# Patient Record
Sex: FEMALE | Race: WHITE | Employment: OTHER | URBAN - METROPOLITAN AREA
[De-identification: names, ages, dates, MRNs, and addresses within clinical notes are randomized per-mention and may not be internally consistent; named-entity substitution may affect disease eponyms.]

---

## 2022-05-02 ENCOUNTER — APPOINTMENT (OUTPATIENT)
Dept: GENERAL RADIOLOGY | Age: 77
End: 2022-05-02
Payer: MEDICARE

## 2022-05-02 ENCOUNTER — APPOINTMENT (OUTPATIENT)
Dept: CT IMAGING | Age: 77
End: 2022-05-02
Payer: MEDICARE

## 2022-05-02 ENCOUNTER — HOSPITAL ENCOUNTER (EMERGENCY)
Age: 77
Discharge: HOME | End: 2022-05-02
Attending: EMERGENCY MEDICINE
Payer: MEDICARE

## 2022-05-02 VITALS
HEIGHT: 60 IN | RESPIRATION RATE: 18 BRPM | OXYGEN SATURATION: 98 % | WEIGHT: 179.1 LBS | TEMPERATURE: 97.8 F | SYSTOLIC BLOOD PRESSURE: 171 MMHG | BODY MASS INDEX: 35.16 KG/M2 | HEART RATE: 90 BPM | DIASTOLIC BLOOD PRESSURE: 65 MMHG

## 2022-05-02 DIAGNOSIS — N20.1 LEFT URETERAL CALCULUS: Primary | ICD-10-CM

## 2022-05-02 DIAGNOSIS — R06.02 SHORTNESS OF BREATH: ICD-10-CM

## 2022-05-02 LAB
ANION GAP SERPL CALCULATED.3IONS-SCNC: 18 MMOL/L (ref 3–16)
BACTERIA: ABNORMAL /HPF
BASOPHILS ABSOLUTE: 0 K/UL (ref 0–0.2)
BASOPHILS RELATIVE PERCENT: 0.2 %
BILIRUBIN URINE: NEGATIVE
BLOOD, URINE: ABNORMAL
BUN BLDV-MCNC: 27 MG/DL (ref 7–20)
CALCIUM SERPL-MCNC: 10.1 MG/DL (ref 8.3–10.6)
CHLORIDE BLD-SCNC: 99 MMOL/L (ref 99–110)
CLARITY: CLEAR
CO2: 18 MMOL/L (ref 21–32)
COLOR: YELLOW
CREAT SERPL-MCNC: 1.3 MG/DL (ref 0.6–1.2)
EOSINOPHILS ABSOLUTE: 0.2 K/UL (ref 0–0.6)
EOSINOPHILS RELATIVE PERCENT: 1.3 %
EPITHELIAL CELLS, UA: 1 /HPF (ref 0–5)
GFR AFRICAN AMERICAN: 48
GFR NON-AFRICAN AMERICAN: 40
GLUCOSE BLD-MCNC: 157 MG/DL (ref 70–99)
GLUCOSE URINE: NEGATIVE MG/DL
HCT VFR BLD CALC: 39.4 % (ref 36–48)
HEMOGLOBIN: 13 G/DL (ref 12–16)
HYALINE CASTS: 0 /LPF (ref 0–8)
KETONES, URINE: NEGATIVE MG/DL
LEUKOCYTE ESTERASE, URINE: NEGATIVE
LYMPHOCYTES ABSOLUTE: 1.8 K/UL (ref 1–5.1)
LYMPHOCYTES RELATIVE PERCENT: 11.5 %
MCH RBC QN AUTO: 28.1 PG (ref 26–34)
MCHC RBC AUTO-ENTMCNC: 33.1 G/DL (ref 31–36)
MCV RBC AUTO: 85 FL (ref 80–100)
MICROSCOPIC EXAMINATION: YES
MONOCYTES ABSOLUTE: 1.3 K/UL (ref 0–1.3)
MONOCYTES RELATIVE PERCENT: 8.3 %
NEUTROPHILS ABSOLUTE: 12.5 K/UL (ref 1.7–7.7)
NEUTROPHILS RELATIVE PERCENT: 78.7 %
NITRITE, URINE: NEGATIVE
PDW BLD-RTO: 15.3 % (ref 12.4–15.4)
PH UA: 8 (ref 5–8)
PLATELET # BLD: 444 K/UL (ref 135–450)
PMV BLD AUTO: 8 FL (ref 5–10.5)
POTASSIUM REFLEX MAGNESIUM: 3.6 MMOL/L (ref 3.5–5.1)
PRO-BNP: 1208 PG/ML (ref 0–449)
PROTEIN UA: ABNORMAL MG/DL
RBC # BLD: 4.64 M/UL (ref 4–5.2)
RBC UA: 7 /HPF (ref 0–4)
SODIUM BLD-SCNC: 135 MMOL/L (ref 136–145)
SPECIFIC GRAVITY UA: 1.01 (ref 1–1.03)
TROPONIN: <0.01 NG/ML
URINE REFLEX TO CULTURE: ABNORMAL
URINE TYPE: ABNORMAL
UROBILINOGEN, URINE: 1 E.U./DL
WBC # BLD: 15.9 K/UL (ref 4–11)
WBC UA: 1 /HPF (ref 0–5)

## 2022-05-02 PROCEDURE — 93005 ELECTROCARDIOGRAM TRACING: CPT | Performed by: EMERGENCY MEDICINE

## 2022-05-02 PROCEDURE — 85025 COMPLETE CBC W/AUTO DIFF WBC: CPT

## 2022-05-02 PROCEDURE — 36415 COLL VENOUS BLD VENIPUNCTURE: CPT

## 2022-05-02 PROCEDURE — 80048 BASIC METABOLIC PNL TOTAL CA: CPT

## 2022-05-02 PROCEDURE — 96360 HYDRATION IV INFUSION INIT: CPT

## 2022-05-02 PROCEDURE — 6370000000 HC RX 637 (ALT 250 FOR IP): Performed by: EMERGENCY MEDICINE

## 2022-05-02 PROCEDURE — 74176 CT ABD & PELVIS W/O CONTRAST: CPT

## 2022-05-02 PROCEDURE — 83880 ASSAY OF NATRIURETIC PEPTIDE: CPT

## 2022-05-02 PROCEDURE — 71046 X-RAY EXAM CHEST 2 VIEWS: CPT

## 2022-05-02 PROCEDURE — 99285 EMERGENCY DEPT VISIT HI MDM: CPT

## 2022-05-02 PROCEDURE — 84484 ASSAY OF TROPONIN QUANT: CPT

## 2022-05-02 PROCEDURE — 2580000003 HC RX 258: Performed by: EMERGENCY MEDICINE

## 2022-05-02 PROCEDURE — 81001 URINALYSIS AUTO W/SCOPE: CPT

## 2022-05-02 RX ORDER — TAMSULOSIN HYDROCHLORIDE 0.4 MG/1
0.4 CAPSULE ORAL DAILY
Qty: 5 CAPSULE | Refills: 0 | Status: ON HOLD | OUTPATIENT
Start: 2022-05-02 | End: 2022-06-14

## 2022-05-02 RX ORDER — 0.9 % SODIUM CHLORIDE 0.9 %
500 INTRAVENOUS SOLUTION INTRAVENOUS ONCE
Status: DISCONTINUED | OUTPATIENT
Start: 2022-05-02 | End: 2022-05-02

## 2022-05-02 RX ORDER — TAMSULOSIN HYDROCHLORIDE 0.4 MG/1
0.4 CAPSULE ORAL ONCE
Status: COMPLETED | OUTPATIENT
Start: 2022-05-02 | End: 2022-05-02

## 2022-05-02 RX ORDER — ACETAMINOPHEN 500 MG
1000 TABLET ORAL ONCE
Status: COMPLETED | OUTPATIENT
Start: 2022-05-02 | End: 2022-05-02

## 2022-05-02 RX ADMIN — ACETAMINOPHEN 1000 MG: 500 TABLET ORAL at 15:20

## 2022-05-02 RX ADMIN — TAMSULOSIN HYDROCHLORIDE 0.4 MG: 0.4 CAPSULE ORAL at 16:43

## 2022-05-02 RX ADMIN — SODIUM CHLORIDE 500 ML: 9 INJECTION, SOLUTION INTRAVENOUS at 15:19

## 2022-05-02 ASSESSMENT — PAIN SCALES - GENERAL
PAINLEVEL_OUTOF10: 4
PAINLEVEL_OUTOF10: 2

## 2022-05-02 ASSESSMENT — PAIN - FUNCTIONAL ASSESSMENT
PAIN_FUNCTIONAL_ASSESSMENT: 0-10
PAIN_FUNCTIONAL_ASSESSMENT: NONE - DENIES PAIN

## 2022-05-02 ASSESSMENT — PAIN DESCRIPTION - LOCATION: LOCATION: FLANK

## 2022-05-02 ASSESSMENT — PAIN DESCRIPTION - FREQUENCY: FREQUENCY: CONTINUOUS

## 2022-05-02 ASSESSMENT — PAIN DESCRIPTION - DESCRIPTORS: DESCRIPTORS: DISCOMFORT

## 2022-05-02 ASSESSMENT — PAIN DESCRIPTION - PAIN TYPE: TYPE: ACUTE PAIN

## 2022-05-02 NOTE — ED PROVIDER NOTES
CHIEF COMPLAINT  Shortness of Breath (s/p being dx with kidney stone, hx a fib)      HISTORY OF PRESENT ILLNESS  Lynette Mullins is a 64 y.o. female who has past medical history of A. Fib, CAD, DM, anxiety, hyperlipidemia presents to the ED complaining of shortness of breath today that occurred this morning when she was at rest.  Patient states that she feels better now that she is in the emergency room and no longer feels short of breath. Denies any home oxygen use. No chest pain. Does have history of A. fib and thought the A. fib may be acting up and therefore called EMS. Denies any nausea or vomiting. No cough. No fevers or chills. States he was recently diagnosed with a kidney stone on 4/30/2022 at [facility].  States that she has been taking Tylenol for pain control which does provide relief. Denies any dysuria or increase in frequency of urination. States she is tolerating oral intake. States she feels that the left flank pain is migrating down towards her left lower quadrant of her abdomen. States he is having normal bowel movements. No other complaints, modifying factors or associated symptoms. Nursing notes reviewed.    Past medical history of A. fib, coronary artery disease, diabetes mellitus, anxiety, hyperlipidemia  Denies any past surgical history  Denies any pertinent family history  Social History     Socioeconomic History    Marital status:      Spouse name: Not on file    Number of children: Not on file    Years of education: Not on file    Highest education level: Not on file   Occupational History    Not on file   Tobacco Use    Smoking status: Not on file    Smokeless tobacco: Not on file   Substance and Sexual Activity    Alcohol use: Not on file    Drug use: Not on file    Sexual activity: Not on file   Other Topics Concern    Not on file   Social History Narrative    Not on file     Social Determinants of Health     Financial Resource Strain:  Difficulty of Paying Living Expenses: Not on file   Food Insecurity:     Worried About Running Out of Food in the Last Year: Not on file    Ran Out of Food in the Last Year: Not on file   Transportation Needs:     Lack of Transportation (Medical): Not on file    Lack of Transportation (Non-Medical): Not on file   Physical Activity:     Days of Exercise per Week: Not on file    Minutes of Exercise per Session: Not on file   Stress:     Feeling of Stress : Not on file   Social Connections:     Frequency of Communication with Friends and Family: Not on file    Frequency of Social Gatherings with Friends and Family: Not on file    Attends Church Services: Not on file    Active Member of [address] [place] [address] [employer] or Organizations: Not on file    Attends Club or Organization Meetings: Not on file    Marital Status: Not on file   Intimate Partner Violence:     Fear of Current or Ex-Partner: Not on file    Emotionally Abused: Not on file    Physically Abused: Not on file    Sexually Abused: Not on file   Housing Stability:     Unable to Pay for Housing in the Last Year: Not on file    Number of [place] in the Last Year: Not on file    Unstable Housing in the Last Year: Not on file     No current facility-administered medications for this encounter.      Current Outpatient Medications   Medication Sig Dispense Refill    tamsulosin (FLOMAX) 0.4 MG capsule Take 1 capsule by mouth daily for 5 doses 5 capsule 0     No Known Allergies      REVIEW OF SYSTEMS  (up to 7 for level 4, 8 or more for level 5) pertinent positives per HPI otherwise noted to be negative    PHYSICAL EXAM  BP (!) 171/65   Pulse 90   Temp 97.8 °F (36.6 °C) (Oral)   Resp 18   Ht 5' (1.524 m)   Wt 179 lb 1.6 oz (81.2 kg)   SpO2 98%   BMI 34.98 kg/m²      CONSTITUTIONAL: AOx4, cooperative with exam, afebrile   HEAD: normocephalic, atraumatic   EYES: PERRL, EOMI, anicteric sclera   ENT: Moist mucous membranes, no swelling of the posterior pharynx   NECK: Supple, symmetric, trachea midline, no stridor   LUNGS: Bilateral breath sounds, CTAB, no rales/ronchi/wheezes, speaking in full sentences   CARDIOVASCULAR: RRR, normal S1/S2, no m/r/g, 2+ pulses throughout   ABDOMEN: Soft, non-tender, non-distended, +BS   NEUROLOGIC:  MAEx4, 5/5 strength throughout; fine touch sensation intact throughout; GCS 15   MUSCULOSKELETAL:  No lower extremity edema or calf tenderness bilaterally   SKIN: No rash, pallor or wounds on exposed surfaces         RADIOLOGY  X-RAYS:  I have reviewed radiologic plain film image(s). ALL OTHER NON-PLAIN FILM IMAGES SUCH AS CT, ULTRASOUND AND MRI HAVE BEEN READ BY THE RADIOLOGIST. CT ABDOMEN PELVIS WO CONTRAST Additional Contrast? None   Final Result   5 mm moderately obstructing left UVJ stone. There is delayed nephrogram   phase and contrast seen in the left renal collecting system and ureter. Perinephric stranding seen surrounding the left kidney as well as stranding   surrounding the left ureter. XR CHEST (2 VW)   Final Result   No acute process. EKG INTERPRETATION  Sinus rhythm with rate of 71, left axis deviation, bifascicular block with left anterior fascicular block and right bundle branch block, , , QTc 478, no ST elevations, nonspecific ST changes, no prior EKG on file. PROCEDURES    ED COURSE/MDM    Patient seen and evaluated. History and physical as above. Nontoxic, afebrile. Patient with complaints of shortness of breath. Patient was concerned that her A. fib may have been causing her shortness of breath. Patient is not in A. fib on her EKG and has not been in A. fib during her ED stay. On my evaluation, patient states she was feeling better and thinks that the shortness of breath may actually be due to her pain from her kidney stone. Did offer patient stronger pain control than Tylenol but patient deferred.   States that Tylenol was doing well at home and she would like to try Tylenol again. 1 g of Tylenol given. Patient's kidney function improved from her lab work on 4/30/2022. No signs of infection on the urine. Troponin negative. BNP mildly elevated at 1208. White count 15.9. Hemoglobin stable 13.0. With patient having known kidney stone and continued pain, will obtain a CT without contrast to evaluate position of stone. ED Course as of 05/02/22 2221   Mon May 02, 2022   1546 Notified by RN that the IV went bad. Patient did receive a small amount of her IV fluid bolus. With the patient tolerating oral intake, will provide large glass of water to drink. Awaiting CT abdomen pelvis results. [DS]   0849 Patient sleeping when I entered the room. Updated patient on blood work and image study results. States she is feeling much better. CT abdomen pelvis shows 5 mm moderately obstructing stone on the left which is consistent with patient's history. Smitha Richa is moving. Discussed with patient that she needs to be taking Flomax which she states she has not been taking. With patient tolerating oral intake, kidney function improving from previous lab work and no signs of septic stone, plan for discharge with outpatient follow-up. Urology follow-up provided. Return instruction provided. All questions answered prior to discharge. [DS]      ED Course User Index  [DS] Arnav Mitchell MD     I estimate there is LOW risk for ACUTE CORONARY SYNDROME, CHRONIC OBSTRUCTIVE PULMONARY DISEASE, CONGESTIVE HEART FAILURE, PERICARDIAL TAMPONADE, PNEUMONIA, PNEUMOTHORAX, PULMONARY EMBOLISM, SEPSIS, and THORACIC DISSECTION,  thus I consider the discharge disposition reasonable. Lester Couch and I have discussed the diagnosis and risks, and we agree with discharging home to follow-up with their primary doctor. We also discussed returning to the Emergency Department immediately if new or worsening symptoms occur.  We have discussed the symptoms which are most concerning (e.g., bloody sputum, fever, worsening pain or shortness of breath, vomiting) that necessitate immediate return. Patient was given scripts for the following medications. I counseled patient how to take these medications. Discharge Medication List as of 5/2/2022  4:37 PM      START taking these medications    Details   tamsulosin (FLOMAX) 0.4 MG capsule Take 1 capsule by mouth daily for 5 doses, Disp-5 capsule, R-0Normal                 CLINICAL IMPRESSION  1. Left ureteral calculus    2. Shortness of breath        Blood pressure (!) 171/65, pulse 90, temperature 97.8 °F (36.6 °C), temperature source Oral, resp. rate 18, height 5' (1.524 m), weight 179 lb 1.6 oz (81.2 kg), SpO2 98 %. DISPOSITION  Patient was discharged to home in good condition. Amy Disla MD  [facility] [address] [place] [address]  [phone]    Call in 1 day  For a follow up appointment. [address] [address]  [phone]    Call in 1 day  For a follow up appointment. Disclaimer: All medical record entries made by [address] [place] [address] [facility].       (Please note that this note was completed with a voice recognition program. Every attempt was made to edit the dictations, but inevitably there remain words that are mis-transcribed.)            Kenna Ravi MD  05/02/22 7743

## 2022-05-03 LAB
EKG ATRIAL RATE: 71 BPM
EKG DIAGNOSIS: NORMAL
EKG P AXIS: 50 DEGREES
EKG P-R INTERVAL: 108 MS
EKG Q-T INTERVAL: 440 MS
EKG QRS DURATION: 144 MS
EKG QTC CALCULATION (BAZETT): 478 MS
EKG R AXIS: -65 DEGREES
EKG T AXIS: 39 DEGREES
EKG VENTRICULAR RATE: 71 BPM

## 2022-05-03 PROCEDURE — 93010 ELECTROCARDIOGRAM REPORT: CPT | Performed by: INTERNAL MEDICINE

## 2022-05-04 ENCOUNTER — HOSPITAL ENCOUNTER (OUTPATIENT)
Dept: GENERAL RADIOLOGY | Age: 77
Discharge: HOME | End: 2022-05-04
Payer: MEDICARE

## 2022-05-04 ENCOUNTER — HOSPITAL ENCOUNTER (OUTPATIENT)
Age: 77
Discharge: HOME | End: 2022-05-04
Payer: MEDICARE

## 2022-05-04 DIAGNOSIS — N20.0 CALCULUS OF KIDNEY: ICD-10-CM

## 2022-05-04 PROCEDURE — 74018 RADEX ABDOMEN 1 VIEW: CPT

## 2022-06-13 ENCOUNTER — APPOINTMENT (OUTPATIENT)
Dept: GENERAL RADIOLOGY | Age: 77
End: 2022-06-13
Payer: MEDICARE

## 2022-06-13 ENCOUNTER — APPOINTMENT (OUTPATIENT)
Dept: CT IMAGING | Age: 77
End: 2022-06-13
Payer: MEDICARE

## 2022-06-13 ENCOUNTER — HOSPITAL ENCOUNTER (EMERGENCY)
Age: 77
Discharge: ANOTHER HOSPITAL | End: 2022-06-14
Attending: EMERGENCY MEDICINE
Payer: MEDICARE

## 2022-06-13 DIAGNOSIS — R41.82 ALTERED MENTAL STATUS, UNSPECIFIED ALTERED MENTAL STATUS TYPE: ICD-10-CM

## 2022-06-13 DIAGNOSIS — R65.10 SIRS (SYSTEMIC INFLAMMATORY RESPONSE SYNDROME) (HCC): ICD-10-CM

## 2022-06-13 DIAGNOSIS — G93.89 BRAIN MASS: Primary | ICD-10-CM

## 2022-06-13 LAB
A/G RATIO: 1.2 (ref 1.1–2.2)
ALBUMIN SERPL-MCNC: 4.5 G/DL (ref 3.4–5)
ALP BLD-CCNC: 95 U/L (ref 40–129)
ALT SERPL-CCNC: 26 U/L (ref 10–40)
AMORPHOUS: ABNORMAL /HPF
ANION GAP SERPL CALCULATED.3IONS-SCNC: 19 MMOL/L (ref 3–16)
AST SERPL-CCNC: 27 U/L (ref 15–37)
BACTERIA: ABNORMAL /HPF
BASOPHILS ABSOLUTE: 0 K/UL (ref 0–0.2)
BASOPHILS RELATIVE PERCENT: 0.2 %
BILIRUB SERPL-MCNC: 0.4 MG/DL (ref 0–1)
BILIRUBIN URINE: NEGATIVE
BLOOD, URINE: NEGATIVE
BUN BLDV-MCNC: 25 MG/DL (ref 7–20)
CALCIUM SERPL-MCNC: 10.4 MG/DL (ref 8.3–10.6)
CHLORIDE BLD-SCNC: 97 MMOL/L (ref 99–110)
CLARITY: ABNORMAL
CO2: 21 MMOL/L (ref 21–32)
COLOR: YELLOW
COMMENT UA: ABNORMAL
CREAT SERPL-MCNC: 1 MG/DL (ref 0.6–1.2)
EOSINOPHILS ABSOLUTE: 0 K/UL (ref 0–0.6)
EOSINOPHILS RELATIVE PERCENT: 0 %
EPITHELIAL CELLS, UA: 1 /HPF (ref 0–5)
GFR AFRICAN AMERICAN: >60
GFR NON-AFRICAN AMERICAN: 53
GLUCOSE BLD-MCNC: 180 MG/DL (ref 70–99)
GLUCOSE BLD-MCNC: 191 MG/DL (ref 70–99)
GLUCOSE URINE: 500 MG/DL
HCT VFR BLD CALC: 40.3 % (ref 36–48)
HEMOGLOBIN: 13.7 G/DL (ref 12–16)
HYALINE CASTS: 3 /LPF (ref 0–8)
KETONES, URINE: ABNORMAL MG/DL
LEUKOCYTE ESTERASE, URINE: NEGATIVE
LIPASE: 69 U/L (ref 13–60)
LYMPHOCYTES ABSOLUTE: 0.8 K/UL (ref 1–5.1)
LYMPHOCYTES RELATIVE PERCENT: 5.1 %
MCH RBC QN AUTO: 28.6 PG (ref 26–34)
MCHC RBC AUTO-ENTMCNC: 33.9 G/DL (ref 31–36)
MCV RBC AUTO: 84.4 FL (ref 80–100)
MICROSCOPIC EXAMINATION: YES
MONOCYTES ABSOLUTE: 0.4 K/UL (ref 0–1.3)
MONOCYTES RELATIVE PERCENT: 2.6 %
MUCUS: ABNORMAL /LPF
NEUTROPHILS ABSOLUTE: 14 K/UL (ref 1.7–7.7)
NEUTROPHILS RELATIVE PERCENT: 92.1 %
NITRITE, URINE: NEGATIVE
PDW BLD-RTO: 16.2 % (ref 12.4–15.4)
PERFORMED ON: ABNORMAL
PH UA: 5.5 (ref 5–8)
PLATELET # BLD: 356 K/UL (ref 135–450)
PMV BLD AUTO: 8.1 FL (ref 5–10.5)
POTASSIUM REFLEX MAGNESIUM: 3.8 MMOL/L (ref 3.5–5.1)
PROTEIN UA: 100 MG/DL
RBC # BLD: 4.78 M/UL (ref 4–5.2)
RBC UA: ABNORMAL /HPF (ref 0–4)
SODIUM BLD-SCNC: 137 MMOL/L (ref 136–145)
SPECIFIC GRAVITY UA: 1.02 (ref 1–1.03)
TOTAL PROTEIN: 8.2 G/DL (ref 6.4–8.2)
TROPONIN: <0.01 NG/ML
URINE REFLEX TO CULTURE: ABNORMAL
URINE TYPE: ABNORMAL
UROBILINOGEN, URINE: 0.2 E.U./DL
WBC # BLD: 15.2 K/UL (ref 4–11)
WBC UA: ABNORMAL /HPF (ref 0–5)

## 2022-06-13 PROCEDURE — 70450 CT HEAD/BRAIN W/O DYE: CPT

## 2022-06-13 PROCEDURE — 96365 THER/PROPH/DIAG IV INF INIT: CPT

## 2022-06-13 PROCEDURE — 93005 ELECTROCARDIOGRAM TRACING: CPT | Performed by: EMERGENCY MEDICINE

## 2022-06-13 PROCEDURE — 71045 X-RAY EXAM CHEST 1 VIEW: CPT

## 2022-06-13 PROCEDURE — 96375 TX/PRO/DX INJ NEW DRUG ADDON: CPT

## 2022-06-13 PROCEDURE — 6370000000 HC RX 637 (ALT 250 FOR IP): Performed by: PHYSICIAN ASSISTANT

## 2022-06-13 PROCEDURE — 99285 EMERGENCY DEPT VISIT HI MDM: CPT

## 2022-06-13 PROCEDURE — 83690 ASSAY OF LIPASE: CPT

## 2022-06-13 PROCEDURE — 80053 COMPREHEN METABOLIC PANEL: CPT

## 2022-06-13 PROCEDURE — 96367 TX/PROPH/DG ADDL SEQ IV INF: CPT

## 2022-06-13 PROCEDURE — 36415 COLL VENOUS BLD VENIPUNCTURE: CPT

## 2022-06-13 PROCEDURE — 2580000003 HC RX 258: Performed by: PHYSICIAN ASSISTANT

## 2022-06-13 PROCEDURE — 84484 ASSAY OF TROPONIN QUANT: CPT

## 2022-06-13 PROCEDURE — 74176 CT ABD & PELVIS W/O CONTRAST: CPT

## 2022-06-13 PROCEDURE — 81001 URINALYSIS AUTO W/SCOPE: CPT

## 2022-06-13 PROCEDURE — 6360000002 HC RX W HCPCS: Performed by: PHYSICIAN ASSISTANT

## 2022-06-13 PROCEDURE — 87040 BLOOD CULTURE FOR BACTERIA: CPT

## 2022-06-13 PROCEDURE — 85025 COMPLETE CBC W/AUTO DIFF WBC: CPT

## 2022-06-13 RX ORDER — DEXAMETHASONE SODIUM PHOSPHATE 4 MG/ML
10 INJECTION, SOLUTION INTRA-ARTICULAR; INTRALESIONAL; INTRAMUSCULAR; INTRAVENOUS; SOFT TISSUE ONCE
Status: COMPLETED | OUTPATIENT
Start: 2022-06-13 | End: 2022-06-13

## 2022-06-13 RX ORDER — LEVETIRACETAM 10 MG/ML
1000 INJECTION INTRAVASCULAR ONCE
Status: COMPLETED | OUTPATIENT
Start: 2022-06-13 | End: 2022-06-13

## 2022-06-13 RX ORDER — 0.9 % SODIUM CHLORIDE 0.9 %
500 INTRAVENOUS SOLUTION INTRAVENOUS ONCE
Status: COMPLETED | OUTPATIENT
Start: 2022-06-13 | End: 2022-06-13

## 2022-06-13 RX ORDER — ONDANSETRON 2 MG/ML
4 INJECTION INTRAMUSCULAR; INTRAVENOUS ONCE
Status: COMPLETED | OUTPATIENT
Start: 2022-06-13 | End: 2022-06-13

## 2022-06-13 RX ORDER — ACETAMINOPHEN 325 MG/1
650 TABLET ORAL ONCE
Status: COMPLETED | OUTPATIENT
Start: 2022-06-13 | End: 2022-06-13

## 2022-06-13 RX ADMIN — SODIUM CHLORIDE 500 ML: 9 INJECTION, SOLUTION INTRAVENOUS at 18:49

## 2022-06-13 RX ADMIN — Medication 6.25 MG: at 20:14

## 2022-06-13 RX ADMIN — ACETAMINOPHEN 650 MG: 325 TABLET ORAL at 18:50

## 2022-06-13 RX ADMIN — ONDANSETRON 4 MG: 2 INJECTION INTRAMUSCULAR; INTRAVENOUS at 18:50

## 2022-06-13 RX ADMIN — LEVETIRACETAM 1000 MG: 10 INJECTION INTRAVENOUS at 20:36

## 2022-06-13 RX ADMIN — DEXAMETHASONE SODIUM PHOSPHATE 10 MG: 4 INJECTION, SOLUTION INTRAMUSCULAR; INTRAVENOUS at 20:33

## 2022-06-13 RX ADMIN — CEFEPIME HYDROCHLORIDE 2000 MG: 2 INJECTION, POWDER, FOR SOLUTION INTRAVENOUS at 20:55

## 2022-06-13 ASSESSMENT — ENCOUNTER SYMPTOMS
CHEST TIGHTNESS: 0
BACK PAIN: 0
SHORTNESS OF BREATH: 0
NAUSEA: 1
ABDOMINAL PAIN: 1
CONSTIPATION: 0
EYES NEGATIVE: 1
DIARRHEA: 0
VOMITING: 0

## 2022-06-13 ASSESSMENT — PAIN - FUNCTIONAL ASSESSMENT
PAIN_FUNCTIONAL_ASSESSMENT: 0-10
PAIN_FUNCTIONAL_ASSESSMENT: 0-10
PAIN_FUNCTIONAL_ASSESSMENT: PREVENTS OR INTERFERES SOME ACTIVE ACTIVITIES AND ADLS

## 2022-06-13 ASSESSMENT — PAIN SCALES - GENERAL
PAINLEVEL_OUTOF10: 9
PAINLEVEL_OUTOF10: 8

## 2022-06-13 ASSESSMENT — PAIN DESCRIPTION - PAIN TYPE: TYPE: ACUTE PAIN

## 2022-06-13 ASSESSMENT — PAIN DESCRIPTION - DESCRIPTORS
DESCRIPTORS: PATIENT UNABLE TO DESCRIBE
DESCRIPTORS: PATIENT UNABLE TO DESCRIBE

## 2022-06-13 ASSESSMENT — PAIN DESCRIPTION - FREQUENCY: FREQUENCY: CONTINUOUS

## 2022-06-13 ASSESSMENT — PAIN DESCRIPTION - LOCATION
LOCATION: ABDOMEN
LOCATION: ABDOMEN

## 2022-06-13 ASSESSMENT — PAIN DESCRIPTION - ORIENTATION: ORIENTATION: RIGHT;LEFT;LOWER;UPPER

## 2022-06-13 ASSESSMENT — PAIN DESCRIPTION - ONSET: ONSET: ON-GOING

## 2022-06-13 NOTE — ED NOTES

## 2022-06-13 NOTE — ED PROVIDER NOTES
[address]  [address] [address]  Dept: [phone]  Loc: [phone]  eMERGENCYdEPARTMENT eNCOUnter      Pt Name: Yonas Campos  MRN: [id number]  Arengfbrice 1945  Date of evaluation: 6/13/2022  Provider:Pam Hall PA-C    CHIEF COMPLAINT       Chief Complaint   Patient presents with    Altered Mental Status     AMS last seen last night around 2200. found sitting in chair, pt was incontinent. per EMS pts temp was 99.8 oral. per family has a hx of UTIs.  Abdominal Pain       CRITICAL CARE TIME   Total Critical Care time was 38 minutes, excluding separately reportable procedures. There was a high probability of clinically significant/life threatening deterioration in the patient's condition which required my urgentintervention. HISTORY OF PRESENT ILLNESS  (Location/Symptom, Timing/Onset, Context/Setting, Quality, Duration,Modifying Factors, Severity.)   Yonas Campos is a 76 y.o. female who presents to the emergency department by EMS. Close friend who is her neighbor last spoke with around 10 PM last night and she was acting normal.  She try to get a hold of her this afternoon was unsuccessful. She eventually went over this evening and found her patient confused, incontinent of urine. EMS was called. Friend states  recently passed away reports no other close family. She states she does have a has history of UTIs per family. She denies any other urinary symptoms at this time. She does complain of nausea and abdominal discomfort. She has any chest pain, shortness of breath, cough, vomiting, diarrhea, headache, fever, chills, back pain. No known sick contacts. Patient reports abdominal pain rated 9/10 in severity throughout abdomen, unable to further elaborate. Nursing Notes were reviewedand agreed with or any disagreements were addressed in the HPI.     REVIEW OF SYSTEMS    (2-9 systems for level 4, 10 or more for level 5)     Review of Systems   Constitutional: Negative for chills and fever. HENT: Negative. Eyes: Negative. Respiratory: Negative for chest tightness and shortness of breath. Cardiovascular: Negative. Gastrointestinal: Positive for abdominal pain and nausea. Negative for constipation, diarrhea and vomiting. Genitourinary: Negative. Musculoskeletal: Negative for arthralgias, back pain and myalgias. Skin: Negative. Neurological: Negative. Negative for headaches. Psychiatric/Behavioral: Positive for confusion. Negative for behavioral problems. Except as noted above the remainder of the review of systems was reviewed and negative. PAST MEDICAL HISTORY         Diagnosis Date    Diabetes mellitus ([facility] [address]        SURGICAL HISTORY     History reviewed. No pertinent surgical history. CURRENT MEDICATIONS     [unfilled]    ALLERGIES     Patient has no known allergies. FAMILY HISTORY     History reviewed. No pertinent family history. No family status information on file. SOCIAL HISTORY      reports that she has never smoked. She has never used smokeless tobacco. She reports that she does not drink alcohol and does not use drugs. PHYSICAL EXAM    (up to 7 for level 4, 8 or more for level 5)     ED Triage Vitals [06/13/22 1645]   Enc Vitals Group      BP (!) 146/70      Heart Rate (!) 111      Resp 24      Temp 98.4 °F (36.9 °C)      Temp Source Oral      SpO2       Weight 176 lb 9.4 oz (80.1 kg)      Height 5' 5\" (1.651 m)      Head Circumference       Peak Flow       Pain Score       Pain Loc       Pain Edu? Excl. in [address]? Physical Exam  Vitals reviewed. Constitutional:       General: She is in acute distress. HENT:      Head: Normocephalic and atraumatic. Cardiovascular:      Rate and Rhythm: Normal rate and regular rhythm. Pulmonary:      Effort: Pulmonary effort is normal. No respiratory distress.       Breath sounds: Normal breath sounds. Abdominal:      Palpations: Abdomen is soft. Tenderness: There is no abdominal tenderness. There is no guarding or rebound. Musculoskeletal:         General: Normal range of motion. Cervical back: Normal range of motion and neck supple. Skin:     General: Skin is warm. Neurological:      General: No focal deficit present. Mental Status: She is alert and oriented to person, place, and time. Comments: No gross deficit noted, patient level awareness waxes and wanes, obeys commands   Psychiatric:         Mood and Affect: Mood normal.           DIAGNOSTIC RESULTS     EKG: All EKG's are interpreted by the Emergency Department Physician who either signs or Co-signs this chart in the absence of a cardiologist.    RADIOLOGY:   Non-plain film images such as CT, Ultrasound and MRI are read by the radiologist. Plain radiographic images are visualized and preliminarilyinterpreted by the emergency physician with the below findings:    Interpretation per the Radiologist below,if available at the time of this note:    CT HEAD WO CONTRAST         CT CHEST ABDOMEN PELVIS WO CONTRAST   Final Result   1. No acute cardiopulmonary process identified. 2. No acute findings within the abdomen or pelvis. 3. Resolved left hydronephrosis. CT HEAD WO CONTRAST   Final Result   3.1 cm partially calcified aggressive/atypical meningioma versus hemorrhagic   contusion in the anterior inferior left frontal lobe with local mass effect   and surrounding vasogenic edema. Further evaluation with MRI brain with and   without contrast is recommended      1.7 mm left to right midline shift at the anterior falx. Critical results were reported to [address] [place] [address] at 6:19 p.m. on June 13, 2022.          XR CHEST PORTABLE   Final Result   Marginal inspiration, without evidence of acute cardiopulmonary disease               LABS:  Labs Reviewed   CBC WITH AUTO DIFFERENTIAL - Abnormal; Notable for the following components:       Result Value    WBC 15.2 (*)     RDW 16.2 (*)     Neutrophils Absolute 14.0 (*)     Lymphocytes Absolute 0.8 (*)     All other components within normal limits   COMPREHENSIVE METABOLIC PANEL W/ REFLEX TO MG FOR LOW K - Abnormal; Notable for the following components:    Chloride 97 (*)     Anion Gap 19 (*)     Glucose 191 (*)     BUN 25 (*)     GFR Non- 53 (*)     All other components within normal limits   URINALYSIS WITH REFLEX TO CULTURE - Abnormal; Notable for the following components:    Clarity, UA CLOUDY (*)     Glucose, Ur 500 (*)     Ketones, Urine TRACE (*)     Protein,  (*)     All other components within normal limits   LIPASE - Abnormal; Notable for the following components:    Lipase 69.0 (*)     All other components within normal limits   MICROSCOPIC URINALYSIS - Abnormal; Notable for the following components:    Mucus, UA Rare (*)     All other components within normal limits   POCT GLUCOSE - Abnormal; Notable for the following components:    POC Glucose 180 (*)     All other components within normal limits   CULTURE, BLOOD 1   CULTURE, BLOOD 2   TROPONIN   LACTATE, SEPSIS   LACTATE, SEPSIS   PROTIME-INR   APTT   TYPE AND SCREEN       All other labs were within normal range or not returned as of this dictation. EMERGENCY DEPARTMENT COURSE and DIFFERENTIAL DIAGNOSIS/MDM:   Vitals:    Vitals:    06/13/22 1845 06/13/22 1900 06/13/22 1915 06/13/22 1930   BP: (!) 152/66 (!) 147/63 136/84 (!) 144/67   Pulse: (!) 104 (!) 102 (!) 106 98   Resp: 28 29 27 (!) 31   Temp:   (!) 100.9 °F (38.3 °C)    TempSrc:   Rectal    SpO2: 94% 92% 100% 98%   Weight:       Height:           MDM     Patient presents ED with HPI noted above. Given altered mental status, broad work-up obtained. CT head showed 3.1 cm partially calcified aggressive/atypical meningioma versus hemorrhagic contusion with local mass-effect and surrounding vasogenic edema.   10 mg IV Decadron and 1 g Keppra given. Consultation made to neurosurgery. Dr. Juan Aguilera kindly spoke with me. Suspect possible seizure as cause of symptoms today. Patient placed on seizure precautions. [facility]. We did later find out patient is on Xarelto, given anticoagulation reconsulted neurosurgery. Dr. Juan Aguilera kindly Sanya Cecily with me. Patient placed in ICU and have a repeat CT head without contrast at 6 hours to ensure no evidence of bleeding. Xarelto to be held, no reversal at this time. Patient denied known meningoma to us, again level of awareness fluctuating. Per records when review in care everywhere, patient has history of meningioma, see report from 1/2020 below. Patient does have low-grade temperature in the ED at 100.9 and leukocytosis with a WBC of 15.2. CT noncontrast chest abdomen pelvis showed no source of infection. Urine showed no evidence of infection. Patient covered broad-spectrum antibiotics, cefepime for potential sepsis pending further workup. [facility] be [facility]. for further treatment/evaluation. Dr. Orin Dailey kindly accepted patient. MRI 1/2020    FINDINGS:   BRAIN PARENCHYMA: Unremarkable.  No mass, infarct, infection, or hemorrhage   VENTRICLES/SULCI: Unremarkable.  No hydrocephalus or midline shift   EXTRA-AXIAL SPACES:  Uniformly enhancing left frontal olfactory groove meningioma is stable measuring up to 3.4 cm in maximum transverse dimension. This abuts the anterior falx which is slightly displaced to the right. Small surrounding zone of vasogenic    edema on FLAIR imaging is unchanged.  Tiny 4 mm right frontal meningioma is also unchanged   FLOW VOIDS: Unremarkable.  Normal signal flow voids in the larger intracranial vessels   PITUITARY: Unremarkable     PARANASAL SINUSES/MASTOIDS: Unremarkable   BONES:  Unremarkable   OTHER:  None              CONSULTS:  IP CONSULT TO NEUROSURGERY  IP CONSULT TO NEUROSURGERY    PROCEDURES:  Procedures    FINAL IMPRESSION      1. Brain mass    2. Altered mental status, unspecified altered mental status type    3. SIRS (systemic inflammatory response syndrome) ([facility])          DISPOSITION/PLAN   [unfilled]    PATIENT REFERRED TO:  No follow-up provider specified.     DISCHARGE MEDICATIONS:  New Prescriptions    No medications on file       (Please note that portions of this note were completed with a voice recognition program.  Efforts were made to edit the dictations but occasionally words are mis-transcribed.)    JENA Packer, [place]  06/13/22 2146       Hardeep Meneses PA-C  06/13/22 2156

## 2022-06-13 NOTE — ED PROVIDER NOTES
Attending Note:    The patient was seen and examined by the mid-level provider. I also completed a face-to-face examination. HPI: Jin Tidwell is a 76 y.o. female who presents to the emergency department complaint of altered mental status. Family tried to call her at noon today. Her last known normal time was 10 PM last night when they spoke to her. She did not answer the phone today. Family went over to check on her and found her sitting in a chair, incontinent of urine with altered mental status. No witnessed seizure activity. She did not bite her tongue. The patient complains primarily of nausea and a mild headache. Headache and nausea have been present for the last couple of days. She denies any prior history of seizure. She denies any abdominal pain back pain or flank pain. She denies any fall trauma or injury. She denies any chest pain or shortness of breath. No diaphoresis. She denies any focal weakness or numbness. Initially, the patient denied any use of anticoagulants but it was later discovered that she is on Xarelto. Physical Exam:     Constitutional: No apparent distress. Nauseated. Head: No visible evidence of trauma. Normocephalic. Eyes: Pupils equal and reactive. No photophobia. Conjunctiva normal.    HENT: Oral mucosa moist.  Airway patent. Neck:  Soft and supple. Nontender. Heart:  Regular rate and rhythm. No murmur. Sinus rhythm on the monitor. Lungs:  Clear to auscultation. No wheezes, rales, or ronchi. No conversational dyspnea or accessory muscle use. Abdomen:  Soft, non-distended. Nontender. No guarding rigidity or rebound. Palpation the epigastric area elicits some nausea but no localizing tenderness. Musculoskeletal: Extremities non-tender with full range of motion. Dorsalis pedis pulses were equal laterally. No calf tenderness erythema or edema. Neurological: At the time of my exam the patient is alert and oriented to person and place. She was unable to give the correct year. She was distracted by her nausea. There is no facial droop. No dysarthria. No aphasia. No pronator drift.  strength equal bilaterally. She was uncooperative with finger-to-nose and heel-to-shin. No drift in the lower extremities. She was able to lift both legs off the bed without difficulty. No apparent motor or sensory deficits. Skin: Skin is warm and dry. No rash. Psychiatric: Normal mood and affect. Behavior is normal.  Unable to fully assess. DIAGNOSTIC RESULTS     EKG: All EKG's are interpreted by the Emergency Department Physician who either signs or Co-signs this chart in the absence of a cardiologist.    Sinus tachycardia. Rate 104. IA interval 148 ms. QRS duration 146 ms. QTc 512 ms. R axis -74 degrees. Right bundle branch block. Left anterior fascicular block. EKG is identical to a previous EKG from May 2, 2022. RADIOLOGY:   Non-plain film images such as CT, Ultrasound and MRI are read by the radiologist. Plain radiographic images are visualized and preliminarily interpreted by the emergency physician with the below findings:        Interpretation per the Radiologist below, if available at the time of this note:    CT HEAD WO CONTRAST         CT CHEST ABDOMEN PELVIS WO CONTRAST   Final Result   1. No acute cardiopulmonary process identified. 2. No acute findings within the abdomen or pelvis. 3. Resolved left hydronephrosis. CT HEAD WO CONTRAST   Final Result   3.1 cm partially calcified aggressive/atypical meningioma versus hemorrhagic   contusion in the anterior inferior left frontal lobe with local mass effect   and surrounding vasogenic edema. Further evaluation with MRI brain with and   without contrast is recommended      1.7 mm left to right midline shift at the anterior falx. Critical results were reported to [address] [place] [address] at 6:19 p.m. on June 13, 2022.          XR CHEST PORTABLE   Final Result   Marginal inspiration, without evidence of acute cardiopulmonary disease               ED BEDSIDE ULTRASOUND:   Performed by ED Physician - none    LABS:  Labs Reviewed   CBC WITH AUTO DIFFERENTIAL - Abnormal; Notable for the following components:       Result Value    WBC 15.2 (*)     RDW 16.2 (*)     Neutrophils Absolute 14.0 (*)     Lymphocytes Absolute 0.8 (*)     All other components within normal limits   COMPREHENSIVE METABOLIC PANEL W/ REFLEX TO MG FOR LOW K - Abnormal; Notable for the following components:    Chloride 97 (*)     Anion Gap 19 (*)     Glucose 191 (*)     BUN 25 (*)     GFR Non- 53 (*)     All other components within normal limits   URINALYSIS WITH REFLEX TO CULTURE - Abnormal; Notable for the following components:    Clarity, UA CLOUDY (*)     Glucose, Ur 500 (*)     Ketones, Urine TRACE (*)     Protein,  (*)     All other components within normal limits   LIPASE - Abnormal; Notable for the following components:    Lipase 69.0 (*)     All other components within normal limits   MICROSCOPIC URINALYSIS - Abnormal; Notable for the following components:    Mucus, UA Rare (*)     All other components within normal limits   POCT GLUCOSE - Abnormal; Notable for the following components:    POC Glucose 180 (*)     All other components within normal limits   CULTURE, BLOOD 1   CULTURE, BLOOD 2   TROPONIN   LACTATE, SEPSIS   LACTATE, SEPSIS   PROTIME-INR   APTT   TYPE AND SCREEN       All other labs were within normal range or not returned as of this dictation.     EMERGENCY DEPARTMENT COURSE and DIFFERENTIAL DIAGNOSIS/MDM:   Vitals:    Vitals:    06/13/22 1845 06/13/22 1900 06/13/22 1915 06/13/22 1930   BP: (!) 152/66 (!) 147/63 136/84 (!) 144/67   Pulse: (!) 104 (!) 102 (!) 106 98   Resp: 28 29 27 (!) 31   Temp:   (!) 100.9 °F (38.3 °C)    TempSrc:   Rectal    SpO2: 94% 92% 100% 98%   Weight:       Height:               MDM      I personally saw and performed a substantive portion of the visit including all aspects of the medical decision making. Patient presents with altered mental status as noted above. She is oriented to person and place at the time of my exam.  Given the clinical history provided, possible the patient may have had a seizure prior to arrival.  No witnessed seizure activity in the emergency department. According to the physician assistant who examined her initially, she may have been somewhat postictal on arrival.    He was sent for CT head without contrast which reveals a 3.1 cm partially calcified mass in the left frontal lobe with surrounding mass-effect and vasogenic edema. There is a 1.7 mm left-to-right midline shift. Patient was given Decadron 10 mg IV. She will be loaded on Keppra. Given her nausea and leukocytosis, we did add on CT chest abdomen pelvis to rule out any occult infection. Urinalysis is negative. Lipase was slightly elevated at 69. CT chest abdomen pelvis revealed no acute findings. She did have a fever while in the emergency department of 100.9. She was given Tylenol. No clear source of infection at this time. We will add on a COVID rapid test.  There is no evidence of urinary tract infection. No intrathoracic or intra-abdominal source of infection. No neck pain or stiffness. No meningeal signs. No photophobia. No suspicion for meningitis at this time. She was given a dose of cefepime 2 g IV. Neurosurgery was contacted by the physician assistant and it was recommended that the patient be transferred to [facility] to the neuro ICU. Physician assistant spoke with Dr. Edwin Sosa. She is stable for transfer. She is hemodynamically stable. Intensivist/hospitalist was paged for [address] [facility] recommends no reversal of anticoagulation. The patient is on Xarelto. She does recommend repeat CT head at 11:45 PM.  If the patient has not yet been transported, CT will be completed here.     CRITICAL CARE TIME     I personally saw the patient and independently provided 15 minutes of non-concurrent critical care out of the total shared critical care time provided. This excludes separately reportable procedures. There was a high probability of clinically significant/life threatening deterioration in the patient's condition which required my urgent intervention. CONSULTS:  IP CONSULT TO NEUROSURGERY  IP CONSULT TO NEUROSURGERY    PROCEDURES:  Unless otherwise noted below, none     Procedures        FINAL IMPRESSION      1. Brain mass    2. Altered mental status, unspecified altered mental status type          DISPOSITION/PLAN   DISPOSITION        PATIENT REFERRED TO:  No follow-up provider specified. DISCHARGE MEDICATIONS:  New Prescriptions    No medications on file     Controlled Substances Monitoring:     No flowsheet data found. (Please note that portions of this note were completed with a voice recognition program.  Efforts were made to edit the dictations but occasionally words are mis-transcribed. )    1859 Jimbo Deshpande DO (electronically signed)  Attending Emergency Physician     Deon Lora DO  06/13/22 2127       Deon Lora DO  06/13/22 2135

## 2022-06-14 ENCOUNTER — APPOINTMENT (OUTPATIENT)
Dept: MRI IMAGING | Age: 77
DRG: 054 | End: 2022-06-14
Attending: INTERNAL MEDICINE
Payer: MEDICARE

## 2022-06-14 ENCOUNTER — APPOINTMENT (OUTPATIENT)
Dept: CT IMAGING | Age: 77
DRG: 054 | End: 2022-06-14
Attending: INTERNAL MEDICINE
Payer: MEDICARE

## 2022-06-14 ENCOUNTER — HOSPITAL ENCOUNTER (INPATIENT)
Age: 77
LOS: 1 days | Discharge: HOME | DRG: 054 | End: 2022-06-15
Attending: INTERNAL MEDICINE | Admitting: INTERNAL MEDICINE
Payer: MEDICARE

## 2022-06-14 VITALS
DIASTOLIC BLOOD PRESSURE: 75 MMHG | HEIGHT: 65 IN | OXYGEN SATURATION: 98 % | HEART RATE: 88 BPM | SYSTOLIC BLOOD PRESSURE: 166 MMHG | WEIGHT: 176.59 LBS | RESPIRATION RATE: 22 BRPM | TEMPERATURE: 99.2 F | BODY MASS INDEX: 29.42 KG/M2

## 2022-06-14 PROBLEM — G40.309 NONINTRACTABLE GENERALIZED IDIOPATHIC EPILEPSY WITHOUT STATUS EPILEPTICUS (HCC): Status: ACTIVE | Noted: 2022-06-14

## 2022-06-14 PROBLEM — G93.40 ENCEPHALOPATHY ACUTE: Status: ACTIVE | Noted: 2022-06-14

## 2022-06-14 PROBLEM — D32.9 MENINGIOMA (HCC): Status: ACTIVE | Noted: 2022-06-14

## 2022-06-14 PROBLEM — G93.89 BRAIN MASS: Status: ACTIVE | Noted: 2022-06-14

## 2022-06-14 LAB
ANION GAP SERPL CALCULATED.3IONS-SCNC: 15 MMOL/L (ref 3–16)
BASOPHILS ABSOLUTE: 0 K/UL (ref 0–0.2)
BASOPHILS RELATIVE PERCENT: 0.2 %
BUN BLDV-MCNC: 23 MG/DL (ref 7–20)
C-REACTIVE PROTEIN: <3 MG/L (ref 0–5.1)
CALCIUM SERPL-MCNC: 9.4 MG/DL (ref 8.3–10.6)
CHLORIDE BLD-SCNC: 102 MMOL/L (ref 99–110)
CO2: 22 MMOL/L (ref 21–32)
CREAT SERPL-MCNC: 0.9 MG/DL (ref 0.6–1.2)
EKG ATRIAL RATE: 104 BPM
EKG DIAGNOSIS: NORMAL
EKG P AXIS: 54 DEGREES
EKG P-R INTERVAL: 148 MS
EKG Q-T INTERVAL: 390 MS
EKG QRS DURATION: 146 MS
EKG QTC CALCULATION (BAZETT): 512 MS
EKG R AXIS: -74 DEGREES
EKG T AXIS: 36 DEGREES
EKG VENTRICULAR RATE: 104 BPM
EOSINOPHILS ABSOLUTE: 0 K/UL (ref 0–0.6)
EOSINOPHILS RELATIVE PERCENT: 0 %
GFR AFRICAN AMERICAN: >60
GFR NON-AFRICAN AMERICAN: >60
GLUCOSE BLD-MCNC: 159 MG/DL (ref 70–99)
GLUCOSE BLD-MCNC: 169 MG/DL (ref 70–99)
GLUCOSE BLD-MCNC: 171 MG/DL (ref 70–99)
GLUCOSE BLD-MCNC: 174 MG/DL (ref 70–99)
GLUCOSE BLD-MCNC: 176 MG/DL (ref 70–99)
GLUCOSE BLD-MCNC: 267 MG/DL (ref 70–99)
HCT VFR BLD CALC: 37.3 % (ref 36–48)
HEMOGLOBIN: 12.5 G/DL (ref 12–16)
LACTIC ACID: 0.9 MMOL/L (ref 0.4–2)
LYMPHOCYTES ABSOLUTE: 0.8 K/UL (ref 1–5.1)
LYMPHOCYTES RELATIVE PERCENT: 5.8 %
MAGNESIUM: 2 MG/DL (ref 1.8–2.4)
MCH RBC QN AUTO: 28.6 PG (ref 26–34)
MCHC RBC AUTO-ENTMCNC: 33.7 G/DL (ref 31–36)
MCV RBC AUTO: 85.1 FL (ref 80–100)
MONOCYTES ABSOLUTE: 0.2 K/UL (ref 0–1.3)
MONOCYTES RELATIVE PERCENT: 1.4 %
NEUTROPHILS ABSOLUTE: 13.5 K/UL (ref 1.7–7.7)
NEUTROPHILS RELATIVE PERCENT: 92.6 %
PDW BLD-RTO: 16.2 % (ref 12.4–15.4)
PERFORMED ON: ABNORMAL
PLATELET # BLD: 339 K/UL (ref 135–450)
PMV BLD AUTO: 8 FL (ref 5–10.5)
POTASSIUM SERPL-SCNC: 3.5 MMOL/L (ref 3.5–5.1)
PROCALCITONIN: 0.07 NG/ML (ref 0–0.15)
RBC # BLD: 4.38 M/UL (ref 4–5.2)
SODIUM BLD-SCNC: 139 MMOL/L (ref 136–145)
TOTAL CK: 138 U/L (ref 26–192)
TROPONIN: <0.01 NG/ML
WBC # BLD: 14.5 K/UL (ref 4–11)

## 2022-06-14 PROCEDURE — 84145 PROCALCITONIN (PCT): CPT

## 2022-06-14 PROCEDURE — 85025 COMPLETE CBC W/AUTO DIFF WBC: CPT

## 2022-06-14 PROCEDURE — 70553 MRI BRAIN STEM W/O & W/DYE: CPT

## 2022-06-14 PROCEDURE — 6360000004 HC RX CONTRAST MEDICATION

## 2022-06-14 PROCEDURE — 6360000002 HC RX W HCPCS: Performed by: STUDENT IN AN ORGANIZED HEALTH CARE EDUCATION/TRAINING PROGRAM

## 2022-06-14 PROCEDURE — 97530 THERAPEUTIC ACTIVITIES: CPT

## 2022-06-14 PROCEDURE — 51798 US URINE CAPACITY MEASURE: CPT

## 2022-06-14 PROCEDURE — 6370000000 HC RX 637 (ALT 250 FOR IP): Performed by: NURSE PRACTITIONER

## 2022-06-14 PROCEDURE — 2500000003 HC RX 250 WO HCPCS

## 2022-06-14 PROCEDURE — G0378 HOSPITAL OBSERVATION PER HR: HCPCS

## 2022-06-14 PROCEDURE — 6360000002 HC RX W HCPCS: Performed by: NURSE PRACTITIONER

## 2022-06-14 PROCEDURE — 97162 PT EVAL MOD COMPLEX 30 MIN: CPT

## 2022-06-14 PROCEDURE — 96376 TX/PRO/DX INJ SAME DRUG ADON: CPT

## 2022-06-14 PROCEDURE — 6370000000 HC RX 637 (ALT 250 FOR IP): Performed by: STUDENT IN AN ORGANIZED HEALTH CARE EDUCATION/TRAINING PROGRAM

## 2022-06-14 PROCEDURE — 51701 INSERT BLADDER CATHETER: CPT

## 2022-06-14 PROCEDURE — 6360000002 HC RX W HCPCS

## 2022-06-14 PROCEDURE — 2580000003 HC RX 258

## 2022-06-14 PROCEDURE — 99223 1ST HOSP IP/OBS HIGH 75: CPT | Performed by: PSYCHIATRY & NEUROLOGY

## 2022-06-14 PROCEDURE — 82550 ASSAY OF CK (CPK): CPT

## 2022-06-14 PROCEDURE — 92610 EVALUATE SWALLOWING FUNCTION: CPT

## 2022-06-14 PROCEDURE — 97165 OT EVAL LOW COMPLEX 30 MIN: CPT

## 2022-06-14 PROCEDURE — 80048 BASIC METABOLIC PNL TOTAL CA: CPT

## 2022-06-14 PROCEDURE — 6370000000 HC RX 637 (ALT 250 FOR IP)

## 2022-06-14 PROCEDURE — 6360000002 HC RX W HCPCS: Performed by: INTERNAL MEDICINE

## 2022-06-14 PROCEDURE — 99233 SBSQ HOSP IP/OBS HIGH 50: CPT | Performed by: INTERNAL MEDICINE

## 2022-06-14 PROCEDURE — 96366 THER/PROPH/DIAG IV INF ADDON: CPT

## 2022-06-14 PROCEDURE — G0379 DIRECT REFER HOSPITAL OBSERV: HCPCS

## 2022-06-14 PROCEDURE — 87040 BLOOD CULTURE FOR BACTERIA: CPT

## 2022-06-14 PROCEDURE — 93005 ELECTROCARDIOGRAM TRACING: CPT | Performed by: STUDENT IN AN ORGANIZED HEALTH CARE EDUCATION/TRAINING PROGRAM

## 2022-06-14 PROCEDURE — 84484 ASSAY OF TROPONIN QUANT: CPT

## 2022-06-14 PROCEDURE — 83605 ASSAY OF LACTIC ACID: CPT

## 2022-06-14 PROCEDURE — 83735 ASSAY OF MAGNESIUM: CPT

## 2022-06-14 PROCEDURE — 2580000003 HC RX 258: Performed by: INTERNAL MEDICINE

## 2022-06-14 PROCEDURE — 1200000000 HC SEMI PRIVATE

## 2022-06-14 PROCEDURE — 96365 THER/PROPH/DIAG IV INF INIT: CPT

## 2022-06-14 PROCEDURE — 74174 CTA ABD&PLVS W/CONTRAST: CPT

## 2022-06-14 PROCEDURE — 93010 ELECTROCARDIOGRAM REPORT: CPT | Performed by: INTERNAL MEDICINE

## 2022-06-14 PROCEDURE — A9576 INJ PROHANCE MULTIPACK: HCPCS

## 2022-06-14 PROCEDURE — 6370000000 HC RX 637 (ALT 250 FOR IP): Performed by: INTERNAL MEDICINE

## 2022-06-14 PROCEDURE — 36415 COLL VENOUS BLD VENIPUNCTURE: CPT

## 2022-06-14 PROCEDURE — 97535 SELF CARE MNGMENT TRAINING: CPT

## 2022-06-14 PROCEDURE — 96367 TX/PROPH/DG ADDL SEQ IV INF: CPT

## 2022-06-14 PROCEDURE — 86140 C-REACTIVE PROTEIN: CPT

## 2022-06-14 PROCEDURE — 97116 GAIT TRAINING THERAPY: CPT

## 2022-06-14 RX ORDER — INSULIN LISPRO 100 [IU]/ML
0-3 INJECTION, SOLUTION INTRAVENOUS; SUBCUTANEOUS NIGHTLY
Status: DISCONTINUED | OUTPATIENT
Start: 2022-06-14 | End: 2022-06-14

## 2022-06-14 RX ORDER — SODIUM CHLORIDE 0.9 % (FLUSH) 0.9 %
5-40 SYRINGE (ML) INJECTION EVERY 12 HOURS SCHEDULED
Status: DISCONTINUED | OUTPATIENT
Start: 2022-06-14 | End: 2022-06-15 | Stop reason: HOSPADM

## 2022-06-14 RX ORDER — POTASSIUM CHLORIDE 20 MEQ/1
40 TABLET, EXTENDED RELEASE ORAL ONCE
Status: COMPLETED | OUTPATIENT
Start: 2022-06-14 | End: 2022-06-14

## 2022-06-14 RX ORDER — ONDANSETRON 4 MG/1
4 TABLET, ORALLY DISINTEGRATING ORAL EVERY 8 HOURS PRN
Status: DISCONTINUED | OUTPATIENT
Start: 2022-06-14 | End: 2022-06-15 | Stop reason: HOSPADM

## 2022-06-14 RX ORDER — PANTOPRAZOLE SODIUM 40 MG/1
40 TABLET, DELAYED RELEASE ORAL
Status: DISCONTINUED | OUTPATIENT
Start: 2022-06-14 | End: 2022-06-15 | Stop reason: HOSPADM

## 2022-06-14 RX ORDER — METRONIDAZOLE 500 MG/100ML
500 INJECTION, SOLUTION INTRAVENOUS EVERY 8 HOURS
Status: DISCONTINUED | OUTPATIENT
Start: 2022-06-14 | End: 2022-06-14

## 2022-06-14 RX ORDER — POTASSIUM CHLORIDE 7.45 MG/ML
10 INJECTION INTRAVENOUS
Status: DISCONTINUED | OUTPATIENT
Start: 2022-06-14 | End: 2022-06-14

## 2022-06-14 RX ORDER — INSULIN LISPRO 100 [IU]/ML
0-6 INJECTION, SOLUTION INTRAVENOUS; SUBCUTANEOUS NIGHTLY
Status: DISCONTINUED | OUTPATIENT
Start: 2022-06-14 | End: 2022-06-15 | Stop reason: HOSPADM

## 2022-06-14 RX ORDER — FLECAINIDE ACETATE 50 MG/1
50 TABLET ORAL 2 TIMES DAILY
COMMUNITY

## 2022-06-14 RX ORDER — ACETAMINOPHEN 650 MG/1
650 SUPPOSITORY RECTAL EVERY 6 HOURS PRN
Status: DISCONTINUED | OUTPATIENT
Start: 2022-06-14 | End: 2022-06-15 | Stop reason: HOSPADM

## 2022-06-14 RX ORDER — AMLODIPINE BESYLATE 10 MG/1
10 TABLET ORAL DAILY
Status: DISCONTINUED | OUTPATIENT
Start: 2022-06-14 | End: 2022-06-15 | Stop reason: HOSPADM

## 2022-06-14 RX ORDER — SENNA AND DOCUSATE SODIUM 50; 8.6 MG/1; MG/1
2 TABLET, FILM COATED ORAL DAILY
Status: DISCONTINUED | OUTPATIENT
Start: 2022-06-14 | End: 2022-06-15 | Stop reason: HOSPADM

## 2022-06-14 RX ORDER — LOSARTAN POTASSIUM 100 MG/1
100 TABLET ORAL DAILY
Status: DISCONTINUED | OUTPATIENT
Start: 2022-06-14 | End: 2022-06-15 | Stop reason: HOSPADM

## 2022-06-14 RX ORDER — SODIUM CHLORIDE 0.9 % (FLUSH) 0.9 %
5-40 SYRINGE (ML) INJECTION PRN
Status: DISCONTINUED | OUTPATIENT
Start: 2022-06-14 | End: 2022-06-15 | Stop reason: HOSPADM

## 2022-06-14 RX ORDER — DEXAMETHASONE SODIUM PHOSPHATE 4 MG/ML
10 INJECTION, SOLUTION INTRA-ARTICULAR; INTRALESIONAL; INTRAMUSCULAR; INTRAVENOUS; SOFT TISSUE EVERY 6 HOURS
Status: DISCONTINUED | OUTPATIENT
Start: 2022-06-14 | End: 2022-06-14

## 2022-06-14 RX ORDER — HYDRALAZINE HYDROCHLORIDE 20 MG/ML
5 INJECTION INTRAMUSCULAR; INTRAVENOUS EVERY 4 HOURS PRN
Status: DISCONTINUED | OUTPATIENT
Start: 2022-06-14 | End: 2022-06-15 | Stop reason: HOSPADM

## 2022-06-14 RX ORDER — M-VIT,TX,IRON,MINS/CALC/FOLIC 27MG-0.4MG
1 TABLET ORAL DAILY
COMMUNITY

## 2022-06-14 RX ORDER — ONDANSETRON 2 MG/ML
4 INJECTION INTRAMUSCULAR; INTRAVENOUS EVERY 6 HOURS PRN
Status: DISCONTINUED | OUTPATIENT
Start: 2022-06-14 | End: 2022-06-15 | Stop reason: HOSPADM

## 2022-06-14 RX ORDER — LOSARTAN POTASSIUM AND HYDROCHLOROTHIAZIDE 25; 100 MG/1; MG/1
1 TABLET ORAL DAILY
COMMUNITY

## 2022-06-14 RX ORDER — DEXAMETHASONE SODIUM PHOSPHATE 4 MG/ML
4 INJECTION, SOLUTION INTRA-ARTICULAR; INTRALESIONAL; INTRAMUSCULAR; INTRAVENOUS; SOFT TISSUE EVERY 6 HOURS
Status: DISCONTINUED | OUTPATIENT
Start: 2022-06-14 | End: 2022-06-15

## 2022-06-14 RX ORDER — INSULIN LISPRO 100 [IU]/ML
0-6 INJECTION, SOLUTION INTRAVENOUS; SUBCUTANEOUS
Status: DISCONTINUED | OUTPATIENT
Start: 2022-06-14 | End: 2022-06-14

## 2022-06-14 RX ORDER — DEXTROSE MONOHYDRATE 50 MG/ML
100 INJECTION, SOLUTION INTRAVENOUS PRN
Status: DISCONTINUED | OUTPATIENT
Start: 2022-06-14 | End: 2022-06-15 | Stop reason: HOSPADM

## 2022-06-14 RX ORDER — INSULIN LISPRO 100 [IU]/ML
0-12 INJECTION, SOLUTION INTRAVENOUS; SUBCUTANEOUS
Status: DISCONTINUED | OUTPATIENT
Start: 2022-06-14 | End: 2022-06-15 | Stop reason: HOSPADM

## 2022-06-14 RX ORDER — LANOLIN ALCOHOL/MO/W.PET/CERES
325 CREAM (GRAM) TOPICAL 2 TIMES DAILY
COMMUNITY

## 2022-06-14 RX ORDER — METFORMIN HYDROCHLORIDE 500 MG/1
1500 TABLET, EXTENDED RELEASE ORAL
COMMUNITY

## 2022-06-14 RX ORDER — ACETAMINOPHEN 325 MG/1
650 TABLET ORAL EVERY 6 HOURS PRN
Status: DISCONTINUED | OUTPATIENT
Start: 2022-06-14 | End: 2022-06-15 | Stop reason: HOSPADM

## 2022-06-14 RX ORDER — ATENOLOL 50 MG/1
50 TABLET ORAL NIGHTLY
COMMUNITY

## 2022-06-14 RX ORDER — SODIUM CHLORIDE 9 MG/ML
INJECTION, SOLUTION INTRAVENOUS PRN
Status: DISCONTINUED | OUTPATIENT
Start: 2022-06-14 | End: 2022-06-15 | Stop reason: HOSPADM

## 2022-06-14 RX ORDER — POLYETHYLENE GLYCOL 3350 17 G/17G
17 POWDER, FOR SOLUTION ORAL DAILY PRN
Status: DISCONTINUED | OUTPATIENT
Start: 2022-06-14 | End: 2022-06-15 | Stop reason: HOSPADM

## 2022-06-14 RX ORDER — AMLODIPINE BESYLATE 10 MG/1
10 TABLET ORAL DAILY
COMMUNITY

## 2022-06-14 RX ORDER — ATORVASTATIN CALCIUM 80 MG/1
80 TABLET, FILM COATED ORAL DAILY
COMMUNITY

## 2022-06-14 RX ORDER — LABETALOL HYDROCHLORIDE 5 MG/ML
10 INJECTION, SOLUTION INTRAVENOUS EVERY 4 HOURS PRN
Status: DISCONTINUED | OUTPATIENT
Start: 2022-06-14 | End: 2022-06-15 | Stop reason: HOSPADM

## 2022-06-14 RX ADMIN — INSULIN LISPRO 2 UNITS: 100 INJECTION, SOLUTION INTRAVENOUS; SUBCUTANEOUS at 17:30

## 2022-06-14 RX ADMIN — DEXAMETHASONE SODIUM PHOSPHATE 10 MG: 4 INJECTION, SOLUTION INTRAMUSCULAR; INTRAVENOUS at 03:11

## 2022-06-14 RX ADMIN — LOSARTAN POTASSIUM 100 MG: 100 TABLET, FILM COATED ORAL at 19:07

## 2022-06-14 RX ADMIN — LEVETIRACETAM 500 MG: 100 INJECTION, SOLUTION, CONCENTRATE INTRAVENOUS at 08:44

## 2022-06-14 RX ADMIN — AMLODIPINE BESYLATE 10 MG: 10 TABLET ORAL at 19:07

## 2022-06-14 RX ADMIN — SODIUM CHLORIDE, PRESERVATIVE FREE 10 ML: 5 INJECTION INTRAVENOUS at 21:06

## 2022-06-14 RX ADMIN — INSULIN LISPRO 3 UNITS: 100 INJECTION, SOLUTION INTRAVENOUS; SUBCUTANEOUS at 21:10

## 2022-06-14 RX ADMIN — POTASSIUM CHLORIDE 10 MEQ: 7.46 INJECTION, SOLUTION INTRAVENOUS at 09:02

## 2022-06-14 RX ADMIN — GADOTERIDOL 17 ML: 279.3 INJECTION, SOLUTION INTRAVENOUS at 22:56

## 2022-06-14 RX ADMIN — SENNOSIDES AND DOCUSATE SODIUM 2 TABLET: 50; 8.6 TABLET ORAL at 13:30

## 2022-06-14 RX ADMIN — DEXAMETHASONE SODIUM PHOSPHATE 4 MG: 4 INJECTION, SOLUTION INTRAMUSCULAR; INTRAVENOUS at 15:04

## 2022-06-14 RX ADMIN — CEFEPIME HYDROCHLORIDE 2000 MG: 2 INJECTION, POWDER, FOR SOLUTION INTRAVENOUS at 03:10

## 2022-06-14 RX ADMIN — POTASSIUM CHLORIDE 40 MEQ: 1500 TABLET, EXTENDED RELEASE ORAL at 09:56

## 2022-06-14 RX ADMIN — LEVETIRACETAM 500 MG: 100 INJECTION, SOLUTION, CONCENTRATE INTRAVENOUS at 21:06

## 2022-06-14 RX ADMIN — DEXAMETHASONE SODIUM PHOSPHATE 4 MG: 4 INJECTION, SOLUTION INTRAMUSCULAR; INTRAVENOUS at 08:56

## 2022-06-14 RX ADMIN — PANTOPRAZOLE SODIUM 40 MG: 40 TABLET, DELAYED RELEASE ORAL at 07:00

## 2022-06-14 RX ADMIN — Medication 0.25 MG: at 02:09

## 2022-06-14 RX ADMIN — IOPAMIDOL 80 ML: 755 INJECTION, SOLUTION INTRAVENOUS at 02:33

## 2022-06-14 RX ADMIN — SODIUM CHLORIDE, PRESERVATIVE FREE 10 ML: 5 INJECTION INTRAVENOUS at 09:07

## 2022-06-14 RX ADMIN — HYDROMORPHONE HYDROCHLORIDE 0.25 MG: 1 INJECTION, SOLUTION INTRAMUSCULAR; INTRAVENOUS; SUBCUTANEOUS at 01:27

## 2022-06-14 RX ADMIN — DEXAMETHASONE SODIUM PHOSPHATE 4 MG: 4 INJECTION, SOLUTION INTRAMUSCULAR; INTRAVENOUS at 21:09

## 2022-06-14 RX ADMIN — METRONIDAZOLE 500 MG: 500 INJECTION, SOLUTION INTRAVENOUS at 03:46

## 2022-06-14 RX ADMIN — INSULIN LISPRO 1 UNITS: 100 INJECTION, SOLUTION INTRAVENOUS; SUBCUTANEOUS at 03:23

## 2022-06-14 RX ADMIN — ACETAMINOPHEN 650 MG: 325 TABLET ORAL at 05:11

## 2022-06-14 RX ADMIN — INSULIN LISPRO 2 UNITS: 100 INJECTION, SOLUTION INTRAVENOUS; SUBCUTANEOUS at 08:48

## 2022-06-14 RX ADMIN — INSULIN LISPRO 2 UNITS: 100 INJECTION, SOLUTION INTRAVENOUS; SUBCUTANEOUS at 12:30

## 2022-06-14 RX ADMIN — METRONIDAZOLE 500 MG: 500 INJECTION, SOLUTION INTRAVENOUS at 10:20

## 2022-06-14 RX ADMIN — HYDROMORPHONE HYDROCHLORIDE 0.25 MG: 1 INJECTION, SOLUTION INTRAMUSCULAR; INTRAVENOUS; SUBCUTANEOUS at 02:09

## 2022-06-14 ASSESSMENT — PAIN SCALES - GENERAL
PAINLEVEL_OUTOF10: 0
PAINLEVEL_OUTOF10: 6
PAINLEVEL_OUTOF10: 3
PAINLEVEL_OUTOF10: 0
PAINLEVEL_OUTOF10: 0
PAINLEVEL_OUTOF10: 4
PAINLEVEL_OUTOF10: 0
PAINLEVEL_OUTOF10: 0
PAINLEVEL_OUTOF10: 4

## 2022-06-14 ASSESSMENT — PAIN DESCRIPTION - ORIENTATION
ORIENTATION: ANTERIOR

## 2022-06-14 ASSESSMENT — PAIN DESCRIPTION - LOCATION
LOCATION: ABDOMEN

## 2022-06-14 ASSESSMENT — PAIN DESCRIPTION - FREQUENCY
FREQUENCY: CONTINUOUS

## 2022-06-14 ASSESSMENT — PAIN DESCRIPTION - PAIN TYPE
TYPE: ACUTE PAIN
TYPE: ACUTE PAIN

## 2022-06-14 ASSESSMENT — PAIN DESCRIPTION - DESCRIPTORS
DESCRIPTORS: PATIENT UNABLE TO DESCRIBE
DESCRIPTORS: PATIENT UNABLE TO DESCRIBE

## 2022-06-14 ASSESSMENT — PAIN DESCRIPTION - ONSET
ONSET: ON-GOING

## 2022-06-14 ASSESSMENT — PAIN - FUNCTIONAL ASSESSMENT
PAIN_FUNCTIONAL_ASSESSMENT: PREVENTS OR INTERFERES SOME ACTIVE ACTIVITIES AND ADLS
PAIN_FUNCTIONAL_ASSESSMENT: PREVENTS OR INTERFERES SOME ACTIVE ACTIVITIES AND ADLS

## 2022-06-14 NOTE — PLAN OF CARE
[place] neurosurgery note    69yo F brought in by squad after found at home having lost control of her bladder and somewhat confused.  (likely had seizure and was post ictal) Head CT shows L frontal mass likely c/w meningioma with some mild vasogenic edema. Also read as partially calcified, but is on full anticoagulation so could have small focus of hemorrhage within mass. ER reports friend states she has known mass they believe is followed by [facility]. Recommend:  1. 10mg decadron IV x1, keppra 1g now then 500 BID  2. Repeat head CT at 6 hours   3. Admit to [address] [place] [address] ICU for q1h neuro checks and seizure precautions  4. Hold anticoagulation for now  5. Check labs (cbc, type and screen, renal panel and coags.)  6.  MRI w/wo in am.     Full consult to follow by team in am.   Robby Crow MD

## 2022-06-14 NOTE — PROGRESS NOTES
Pharmacy Note - Renal Dosing and Extended Infusion Beta-Lactam Adjustment    Cefepime 2000 mg every 12 hours ordered for patient. Per [facility] Renal Dose Adjustment Policy and Extended Infusion Beta-Lactam Policy, order will be changed to 2000 mg every 24 hours. Estimated Creatinine Clearance: Estimated Creatinine Clearance: 47 mL/min (based on SCr of 1 mg/dL). Dialysis Status, EVERARDO, CKD:   BMI: There is no height or weight on file to calculate BMI. Rationale for Adjustment: Agent is renally eliminated and demonstrates time-dependent effect on bacterial eradication. Extended-infusion dosing strategy aims to enhance microbiologic and clinical efficacy. Pharmacy will continue to monitor renal function, cultures and sensitivities (where available) and adjust dose as necessary. Please call with any questions.     Audie Boeck, PharmD, BCPS

## 2022-06-14 NOTE — PROGRESS NOTES
Pt to ICU room 4526. 2x PIV in right and left arm, no infusions at the moment. Pt moaning and c/o moderate pain in stomach, MD aware and follow up orders for pain placed. All belongings in closet. Pt A/OX4. Follows commands in all extremities. VSS. Will continue to monitor.

## 2022-06-14 NOTE — CONSULTS
Neurology / Jeramie Goodwin Consult Note    De Antonio MD is requesting this consult. Reason for Consult: poss seizure, known L frontal meningioma w/ edema  Admission Chief Complaint: AMS    History of Present Illness     Vikki Jaquez is a 76 y.o. y/o female with PMH significant for diabetes mellitus and meningioma. Patient's friend found her confused and incontinent of urine after she was unsuccessful getting a hold of the patient that afternoon. EMS was called, no reports of witnessed seizure activity. She was brought to the ER and a ct head was obtained which demonstrated a 3 x 2 x 1.8 cm mass in the anterior inferior L frontal lobe with adjacent vasogenic edema and minimal shift of the anterior falx. This is a known meningioma (discovered in 2019 after presenting with generalized weakness/malaise, thought to have had unwitnessed seizure and be post ictal), and at one time she was on Keppra. Apparently she reported to Neurosurgery that she stopped taking her seizure medication and she did not remember why. She was given 1000 mg of Keppra in the ER and then 500 mg BID was ordered as maintenance. She was given decadron as well. Most recent MRI brain 2/05/21. Apparently had multiple UTIs in the recent past.         REVIEW OF SYSTEMS:   Constitutional- No weight loss or fevers   Eyes- No diplopia. No photophobia. Ears/nose/throat- No dysphagia. No Dysarthria   Cardiovascular- No palpitations. No chest pain   Respiratory- No dyspnea. No Cough   Gastrointestinal- No Abdominal pain. No Vomiting. Genitourinary- No incontinence. No urinary retention   Musculoskeletal- No myalgia. No arthralgia   Skin- No rash. No easy bruising. Psychiatric- No depression. No anxiety   Endocrine- No diabetes. No thyroid issues. Hematologic- No bleeding difficulty. No fatigue   Neurologic- No headache. No focal weakness.  +Confusion    Past Medical, Surgical, Family, and Social History   PAST MEDICAL HISTORY:  Past Medical History:   Diagnosis Date    Diabetes mellitus ([facility] [address]      SURGICAL HISTORY:  No past surgical history on file. FAMILY HISTORY & SOCIAL HISTORY:  Family history non-contributory  No family history on file.   Social History     Tobacco Use    Smoking status: Never Smoker    Smokeless tobacco: Never Used   Substance Use Topics    Alcohol use: Never    Drug use: Never         Allergies & Outpatient Medications   ALLERGIES:  No Known Allergies  HOME MEDICATIONS:  Current Discharge Medication List      CONTINUE these medications which have NOT CHANGED    Details   tamsulosin (FLOMAX) 0.4 MG capsule Take 1 capsule by mouth daily for 5 doses  Qty: 5 capsule, Refills: 0               Physical Exam   PHYSICAL EXAM:  Vitals:    06/14/22 0800 06/14/22 0900 06/14/22 1000 06/14/22 1100   BP: (!) 113/56 (!) 140/60 139/88 (!) 141/54   Pulse: 71 72 76 73   Resp: 14 23 25 14   Temp: 97.5 °F (36.4 °C)      TempSrc: Oral      SpO2: 93% 96% 98% 94%   Weight:       Height:             General: Drowsy but easily arousable, no distress, well-nourished  Neurologic  Mental status:   orientation to person, place, time, but states she is here for a \"cardiac problem\"   Attention  Needs some reminders to attend to exam   Language speech clear but delayed responses and sometimes confused responses    Comprehension intact; follows simple commands    Cranial nerves:   CN2: Visual fields full w/o extinction on confrontational testing  CN 3,4,6: Pupils equal and reactive to light, extraocular muscles intact  CN5: Facial sensation symmetric   CN7: Face symmetric  CN8: Hearing symmetric to spoken voice  CN9: Palate elevated symmetrically  CN11: Traps full strength on shoulder shrug  CN12: Tongue midline with protrusion    Motor Exam:   R  L    Deltoid 5  5   Biceps 5 5   Triceps 5 5   Wrist extension  5 5   Interossei 5 5      R  L    Hip flexion  5  5   Hip extension  5 5   Knee flexion  5 5   Knee extension  5 5   Ankle dorsiflexion  5 5   Ankle plantar flexion  5 5     Sensory: light touch intact and symmetric in all 4 extremities. No sensory extinction on bilateral simultaneous stimulation  Cerebellar/coordination: finger nose finger normal without ataxia  Tone: normal in all 4 extremities  Gait: held for patient safety    OTHER SYSTEMS:  Cardiovascular: Warm, appears well perfused   Respiratory: Easy, non-labored respiratory pattern   Abdominal: Abdomen is without distention   Extremities: Upper and lower extremities are atraumatic in appearance without deformity. No swelling or erythema. Diagnostic Testing Results   IMAGES:  Images personally reviewed and agree w/ radiology interpretation. Head CT w/o Contrast:  Impression   A 3 x 2 x 1.8 cm mass in the anterior inferior left frontal lobe is   redemonstrated with a density higher than normal brain parenchyma.  Other   hyperdense areas in the mass suggest calcification.  There is adjacent   vasogenic edema in the white matter and minimal shift of the anterior falx. The CT appearance is unchanged from the very recent exam.  Suspect a   aggressive/atypical meningioma more than traumatic contusion.  MRI with and   without contrast is recommended to confirm the diagnosis and further evaluate.       No other foci of hyperdensity, vasogenic edema, or extra-axial hemorrhage. The ventricles are not dilated. MRI Brain w & w/o Contrast 2/05/21:  FINDINGS:   BRAIN PARENCHYMA: Unremarkable.  No mass, infarct, infection, or hemorrhage   VENTRICLES/SULCI: Unremarkable.  No hydrocephalus or midline shift   EXTRA-AXIAL SPACES:  Uniformly enhancing left frontal olfactory groove meningioma is stable measuring up to 3.4 cm in maximum transverse dimension. This abuts the anterior falx which is slightly displaced to the right. Small surrounding zone of vasogenic    edema on FLAIR imaging is unchanged. Tiny 4 mm right frontal meningioma is also unchanged   FLOW VOIDS: Unremarkable.  Normal signal flow voids in the larger intracranial vessels   PITUITARY: Unremarkable     PARANASAL SINUSES/MASTOIDS: Unremarkable   BONES:  Unremarkable   OTHER:  None       IMPRESSION       Unchanged left olfactory groove meningioma and tiny right frontal meningioma over the past year        LABS:  All results below personally reviewed. Pertinent positives & negatives are addressed in Impression & Recommendations below. LABS   Metabolic Panel Recent Labs     06/13/22  1700 06/14/22  0639    139   K 3.8 3.5   CL 97* 102   CO2 21 22   BUN 25* 23*   CREATININE 1.0 0.9   GLUCOSE 191* 171*   CALCIUM 10.4 9.4   LABALBU 4.5  --    MG  --  2.00   ALKPHOS 95  --    ALT 26  --    AST 27  --       CBC / Coags Recent Labs     06/13/22  1700 06/14/22  0639   WBC 15.2* 14.5*   RBC 4.78 4.38   HGB 13.7 12.5   HCT 40.3 37.3    339      Other No results for input(s): LABA1C, LDLCALC, TRIG, TSH, PEGSSKGX65, FOLATE, LABSALI, COVID19 in the last 72 hours.   Recent Labs     06/14/22  0639   LACTA 0.9          CURRENT SCHEDULED MEDICATIONS   Inpatient Medications     sodium chloride flush, 5-40 mL, IntraVENous, 2 times per day    levETIRAcetam, 500 mg, IntraVENous, Q12H    insulin lispro, 0-12 Units, SubCUTAneous, TID WC    insulin lispro, 0-6 Units, SubCUTAneous, Nightly    dexamethasone, 4 mg, IntraVENous, Q6H    pantoprazole, 40 mg, Oral, QAM AC    sennosides-docusate sodium, 2 tablet, Oral, Daily   Infusions    sodium chloride      dextrose        Antibiotics   Recent Abx Admin                   metronidazole (FLAGYL) 500 mg in 0.9% NaCl 100 mL IVPB premix (mg) 500 mg New Bag 06/14/22 1020     500 mg New Bag  0346    cefepime (MAXIPIME) 2000 mg IVPB minibag in NS (mg) 2,000 mg New Bag 06/14/22 0310    cefepime (MAXIPIME) 2000 mg IVPB minibag (mg) 2,000 mg New Bag 06/13/22 2055                   IMPRESSION & RECOMMENDATIONS     IMPRESSION:  Harpreet Barnett is a 76 y.o. y/o female with PMH significant for diabetes mellitus and meningioma who presents after neighbor found her altered and incontinent of urine. She has been maintained on Keppra in the setting of suspected seizure and meningioma discovered in 2019, but it seems like she may have not been compliant with that as she told the Neurosurgery NP that she stopped taking her seizure medication. RECOMMENDATIONS:  - Agree with MRI brain w and WO contrast  - Continue Keppra 500 mg BID  - Decadron per Neurosurgery  - If patient's confusion does not improve, would consider obtaining routine EEG at that time  - Call Neurology with any exam changes      RADHA Boo - [facility]   Neurology & Neurocritical Care   Neurology Line: [phone]  [employer]: [facility] Neurology & Neuro Critical Care NPs  6/14/2022 12:41 PM    I spent  minutes in the care of this patient. Over 50% of that time was in face-to-face counseling regarding disease process, diagnostic testing, preventative measures, and answering patient and family questions.

## 2022-06-14 NOTE — PROGRESS NOTES
Physician Progress Note      PATIENT:               Eula Casarez  CSN #:                  [id number]  :                       1945  ADMIT DATE:       2022 12:52 AM  DISCH DATE:  RESPONDING  PROVIDER #:        Lisa Jacobson MD          QUERY TEXT:    Pt admitted with Brain mass and has encephalopathy documented in H&P. If   possible, please document in progress notes and discharge summary further   specificity regarding the type of encephalopathy:      The medical record reflects the following:  Risk Factors: brain mass  Clinical Indicators: \"Acute encephalopaty 2/2 likley agressive/atypical   meningioma versus hemorrhagic contusion in the anterior inferior left frontal   lobe, rule out seizure Presented with AMS and urinary incontinence: Sepsis of   unknown origin vs reactive leukocytosis and fever of central origin Presented   with AMS, ?leukocytosis, tachycardia, tachypnea, low grade fever. Patient was   complaining of severe. \"  Treatment: Neurochecks Q1H, MRI, UA, CXR, Lab monitoring, NSGY consult,   Cefepime and flagyl  Options provided:  -- Acute Metabolic encephalopathy  -- Septic encephalopathy  -- Other - I will add my own diagnosis  -- Disagree - Not applicable / Not valid  -- Disagree - Clinically unable to determine / Unknown  -- Refer to Clinical Documentation Reviewer    PROVIDER RESPONSE TEXT:    This patient has Acute metabolic encephalopathy.     Query created by: Darylene Gosselin on 2022 12:25 PM      Electronically signed by:  Lisa Jacobson MD 2022 3:24 PM

## 2022-06-14 NOTE — PLAN OF CARE
Problem: Discharge Planning  Goal: Discharge to home or other facility with appropriate resources  Outcome: Progressing  Flowsheets  Taken 6/14/2022 0800 by Chon Briggs RN  Discharge to home or other facility with appropriate resources:   Identify barriers to discharge with patient and caregiver   Arrange for needed discharge resources and transportation as appropriate   Identify discharge learning needs (meds, wound care, etc)   Arrange for interpreters to assist at discharge as needed   Refer to discharge planning if patient needs post-hospital services based on physician order or complex needs related to functional status, cognitive ability or social support system     Problem: Pain  Goal: Verbalizes/displays adequate comfort level or baseline comfort level  Outcome: Progressing  Flowsheets (Taken 6/14/2022 0800)  Verbalizes/displays adequate comfort level or baseline comfort level:   Encourage patient to monitor pain and request assistance   Assess pain using appropriate pain scale   Administer analgesics based on type and severity of pain and evaluate response   Implement non-pharmacological measures as appropriate and evaluate response   Consider cultural and social influences on pain and pain management   Notify Licensed Independent Practitioner if interventions unsuccessful or patient reports new pain     Problem: Safety - Adult  Goal: Free from fall injury  Outcome: Progressing     Problem: ABCDS Injury Assessment  Goal: Absence of physical injury  Outcome: Progressing     Problem: Chronic Conditions and Co-morbidities  Goal: Patient's chronic conditions and co-morbidity symptoms are monitored and maintained or improved  Outcome: Progressing

## 2022-06-14 NOTE — CONSULTS
Clinical Pharmacy Progress Note  Medication History    Admit Date: 6/14/2022  Consulting Provider: Dr. Rolf Alexis    List of current medications patient is taking is complete. Home medication list in EPIC updated to reflect changes noted below. Source of information: patient and outpatient fill history    Changes made to medication list:   Medications removed:  · Tamsulosin (completed course of therapy)    Medications added:   · Atenolol 50 mg daily  · Atorvastatin 80 mg daily   · Please note: Patient last filled this medication on 1/13/22 for a 90 day supply. When patient was questioned about this, she replied \"well I guess I'm not taking it then\". Unable to find that medication was discontinued by provider; therefore, added to the home medication list and marked as \"not taking\". · Flecainide 50 mg twice daily  · Losartan/HCTZ 100/25 mg daily  · Rivaroxaban 20 mg daily  · Please note: Asked patient when she last took this medication. She replied \"probably last night\". Given patient's admission time, unsure whether this is correct. · Amlodipine 10 mg daily  · Metformin ER 1500 mg daily  · Multivitamin daily  · Glucosamine/chondroitin/MSM 1 tab daily  · Vitamin D 1000 units daily  · Ferrous sulfate 325 mg twice daily    Please note:   · Patient has cataracts surgery scheduled and is planned to start eye drops (Prolensa 0.07%, ofloxacin 0.3%, and prednisolone acetate 1%) prior to/after surgery. These were not added to the home medication list as patient hasn't started taking them yet. Please call with any questions.     Juan Marin, Lu, [facility]  Wireless: [phone]  6/14/2022 1:20 PM

## 2022-06-14 NOTE — PROGRESS NOTES
Occupational Therapy  Facility/Department: [facility] ICU  Occupational Therapy Initial Assessment/Tx Note     Name: Rachel Price  : 1945  MRN: [id number]  Date of Service: 2022    Assessment: pt admitted on  and found to have a L frontal mass likely c/w meningioma. pt from home alone and reported baseline is independent w/ adls and functional mobility w/o AD. Currently pt is below baseline requiring SBA for adls and CGA-min a for functional mobility. pt mildly lethargic during session. Anticipate improvement in function as lethargy improves. pt has supportive neighbors who can check on her at home. Recommend pt return home w/ initial 24hr A and HHOT/skilled OT as needed to maximize endurance and return to PLOF. Will follow POC throughout stay. Discharge Recommendations: Rachel Price scored a 21/24 on the AM-PAC ADL Inpatient form. Current research shows that an AM-PAC score of 18 or greater is typically associated with a discharge to the patient's home setting. Based on the patient's AM-PAC score, and their current ADL deficits, it is recommended that the patient have 2-3 sessions per week of Occupational Therapy at d/c to increase the patient's independence. At this time, this patient demonstrates the endurance and safety to discharge home with increased assist and a follow up treatment frequency of 2-3x/wk. Please see assessment section for further patient specific details. If patient discharges prior to next session this note will serve as a discharge summary. Please see below for the latest assessment towards goals. OT Equipment Recommendations  Equipment Needed: No       Patient Diagnosis(es): There were no encounter diagnoses. Past Medical History:  has a past medical history of Diabetes mellitus ([facility] [address] Past Surgical History:  has no past surgical history on file.     Treatment Diagnosis: Decreased functional mobility, adl status and endurance      Assessment   Performance deficits / Impairments: Decreased functional mobility ; Decreased ADL status; Decreased endurance    Treatment Diagnosis: Decreased functional mobility, adl status and endurance  Prognosis: Good  Decision Making: Low Complexity  REQUIRES OT FOLLOW-UP: Yes    Activity Tolerance  Activity Tolerance: Patient Tolerated treatment well  Activity Tolerance Comments: pt felt dizzy when supine in bed. HOB was elevated and symptoms subsided with time. /61 o2: 97%        Plan   Plan  Times per Week: 2-5x  Times per Day: Daily  Current Treatment Recommendations: Strengthening,ROM,Balance training,Endurance training,Functional mobility training,Self-Care / ADL,Safety education & training     Restrictions  Position Activity Restriction  Other position/activity restrictions: up with assistance    Subjective   General  Patient assessed for rehabilitation services?: Yes  Additional Pertinent Hx: pt is a 76 y.o female admitted on 6/14 after pt was found confused and incontinent. Head CT shows L frontal mass likely c/w meningioma with some mild vasogenic edema. PMHx: DM  Family / Caregiver Present: No  Referring Practitioner: Anni Jacobs  Subjective  Subjective: pt asleep in bed upon entry. pt was pleasant and cooperative with therapy. Pt was lethargic throughout session.   General Comment  Comments: no pain  Pain: patient denies pain throughout session  Social/Functional History  Social/Functional History  Lives With: Alone  Type of Home: House  Home Layout: Two level,Bed/Bath upstairs,Able to Live on Main level with bedroom/bathroom (half bath on first floor)  Home Access: Level entry  Bathroom Shower/Tub: Tub/Shower unit  Bathroom Toilet: Handicap height  Has the patient had two or more falls in the past year or any fall with injury in the past year?: No  ADL Assistance: [facility]: Independent  Homemaking Responsibilities: Yes  Ambulation Assistance: Independent  Transfer Assistance: Independent  Active : Yes  Occupation: Retired  Type of Occupation: sales  Additional Comments: reports neighbor is able to come help       Objective     Vision Exceptions: Wears glasses at all times  Hearing: Within functional limits       Observation/Palpation  Observation: purwick  Safety Devices  Type of Devices: Left in bed;Call light within reach;Gait belt;Nurse notified; Bed alarm in place  Restraints  Restraints Initially in Place: No  Balance  Sitting:  (SBA sitting EOB)  Standing:  (SBA standing at EOB and at sink. 1 min + 4 min)  Gait  Overall Level of Assistance: Contact-guard assistance;Minimum assistance (CGA decline to Min A at times. pt ambulated in hallway and to/from bathroom)  Assistive Device:  (no device)     Toilet Transfers  Toilet - Technique: Ambulating  Equipment Used: Standard toilet  Toilet Transfer: Stand by assistance    AROM: Generally decreased, functional  Strength: Generally decreased, functional (R side is slightly weaker than L, both WFL)  Coordination: Generally decreased, functional  Sensation: Intact    ADL  Grooming: Stand by assistance (Oral care and hand hygiene standing at sink)  LE Dressing: Stand by assistance (don socks sitting in chair)  Toileting: Stand by assistance       Bed mobility  Supine to Sit: Stand by assistance (HOB elevated)  Sit to Supine: Supervision (HOB flat)  Scooting: Supervision (to EOB)  Transfers  Sit to stand: Stand by assistance  Stand to sit: Stand by assistance     Cognition  Overall Cognitive Status: Exceptions  Arousal/Alertness: Delayed responses to stimuli  Following Commands: Follows one step commands with increased time; Follows one step commands with repetition  Attention Span: Attends with cues to redirect  Safety Judgement: Decreased awareness of need for assistance;Decreased awareness of need for safety  Problem Solving: Decreased awareness of errors  Insights: Decreased awareness of deficits  Initiation: Requires cues for some  Sequencing: Requires cues for some  Perception  Overall Perceptual Status: [facility]               Education Given To: Patient  Education Provided: Role of Therapy;Plan of Care;Transfer Training;Orientation; ADL Adaptive Strategies  Education Method: Verbal  Barriers to Learning: Cognition  Education Outcome: Verbalized understanding;Continued education needed             AM-PAC Score        AM-PAC Inpatient Daily Activity Raw Score: 21 (06/14/22 1620)  AM-PAC Inpatient ADL T-Scale Score : 44.27 (06/14/22 1620)  ADL Inpatient CMS 0-100% Score: 32.79 (06/14/22 1620)  ADL Inpatient CMS G-Code Modifier : CJ (06/14/22 1620)    Goals  Short Term Goals  Time Frame for Short term goals: by d/c  Short Term Goal 1: complete LB dressing spvn - not met  Short Term Goal 2: increase standing tolerance to 6 min for grooming tasks at sink - not met  Short Term Goal 3: complete toilet transfer w/ spvn- not met       Therapy Time   Individual Concurrent Group Co-treatment   Time In 1333         Time Out 1412         Minutes 39           Timed Code Tx Min: 24  Total Tx Min: 44    Therapist was present, directed the patient's care, made skilled judgment, and was responsible for assessment and treatment of the patient. If patient is discharged prior to next treatment, this not will serve as the discharge summary.          Geeta Ford S/OT  (Lesly Sneed OTR/L, [id number])

## 2022-06-14 NOTE — H&P
ICU HISTORY AND PHYSICAL       Hospital Day:   ICU Day:                                                          Code:No Order  Admit Date: (Not on file)  PCP: Billy Faye                                  CC: AMS    HISTORY OF PRESENT ILLNESS:   Miguel Burt is a 76 y.o. female with PMH as below notable for DM, HTN, Afib, meningioma, Iron deficiency anemia  who presented with AMS. Last known normal at 10 PM two nights ago. Her friend found the patient confused, incontinent of urine. EMS was called. Patient reported she wake up two nights ago with abdominal pain, generalized but accentuated in the suprapubic region, patient does not report any alleviating or worsening factors. Patient reports her abdominal pain got worse and is not able to explain how she got to the ED. The patient denies anorexia, nausea or vomiting, dysphagia, change in bowel habits or black or bloody stools or weight loss. Patient denies any exertional chest pain, dyspnea, palpitations, syncope, orthopnea, edema or paroxysmal nocturnal dyspnea. The patient denies dysuria, frequency or hematuria. She denies constitutional symptoms of fatigue, weakness, weight loss or gain, fevers, night sweats. On admission patient was hemodynamically stable and afebrile. Patient was AO only to self, confused,  complaining of generalized abdominal pain 9/10 in intensity. WBC 15. EKG did not show any acute ischemic changes. Trop neg. HCO3 21, anion gap 19. UA neg for infection, trace ketones, +gluc and prot. Lipase 69. CXR did not show any acute cardiopulmonary abnormality. CT head: 3.1 cm partially calcified aggressive/atypical meningioma versus hemorrhagic  contusion in the anterior inferior left frontal lobe with local mass effect  and surrounding vasogenic edema. 1.7 mm left to right midline shift at the anterior falx. NSGY was consulted. Patient was started on Keppra and Decadron. CT CAP: 1. No acute cardiopulmonary process identified.  2. No acute findings within the abdomen or pelvis. 3. Resolved left hydronephrosis. Patient was admitted to the ICU for further workup and management of meningioma with hemorraghic contusion. PAST HISTORY:     Past Medical History:   Diagnosis Date    Diabetes mellitus ([address]        No past surgical history on file. SocialHistory:    reports that she has never smoked. She has never used smokeless tobacco. She reports that she does not drink alcohol and does not use drugs. Family History:  No family history on file. MEDICATIONS:     Current Facility-Administered Medications on File Prior to Encounter   Medication Dose Route Frequency Provider Last Rate Last Admin    cefepime (MAXIPIME) 2000 mg IVPB minibag  2,000 mg IntraVENous Q12H Pam Hall PA-C 100 mL/hr at 06/13/22 2055 2,000 mg at 06/13/22 2055    promethazine (PHENERGAN) 6.25 mg in sodium chloride 0.9% 50 mL IVPB  6.25 mg IntraVENous Q6H PRN Kodak Mitchell PA-C   Stopped at 06/13/22 2031     Current Outpatient Medications on File Prior to Encounter   Medication Sig Dispense Refill    tamsulosin (FLOMAX) 0.4 MG capsule Take 1 capsule by mouth daily for 5 doses 5 capsule 0         Scheduled Meds:   Continuous Infusions:  PRN Meds:    Allergies: No Known Allergies    REVIEW OF SYSTEMS:       History obtained from chart review and the patient    Review of Systems  A 10 point review of systems was conducted, significant findings as noted in HPI. PHYSICAL EXAM:       Vitals: There were no vitals taken for this visit. I/O:  No intake or output data in the 24 hours ending 06/13/22 2152  No intake/output data recorded. No intake/output data recorded. Physical Examination:     Physical Exam  Constitutional:       General: She is in acute distress (from her abdominal pain). Appearance: Normal appearance. HENT:      Head: Normocephalic.       Mouth/Throat:      Mouth: Mucous membranes are moist.   Eyes:      Extraocular Movements: Extraocular movements intact. Conjunctiva/sclera: Conjunctivae normal.      Pupils: Pupils are equal, round, and reactive to light. Cardiovascular:      Rate and Rhythm: Normal rate and regular rhythm. Pulses: Normal pulses. Heart sounds: Normal heart sounds. Pulmonary:      Effort: Pulmonary effort is normal.      Breath sounds: Normal breath sounds. Abdominal:      General: Abdomen is flat. Bowel sounds are normal.      Palpations: Abdomen is soft. Tenderness: There is abdominal tenderness (Generalized but accentuated in suprapubic region). Musculoskeletal:         General: Normal range of motion. Cervical back: Normal range of motion. Skin:     General: Skin is warm. Neurological:      General: No focal deficit present. Mental Status: She is alert and oriented to person, place, and time. Mental status is at baseline. Psychiatric:         Mood and Affect: Mood normal.         Behavior: Behavior normal.         Access:   -Central Access Day #: NA                                -Peripheral Access Day#: 1  -Arterial line Day#:  NA                              Mcnally Day#: NA  NGT Day#:       NA                                   ETT Day#: NA  Vent Settings:  NA      IV:      DATA:       Labs:  CBC:   Recent Labs     06/13/22  1700   WBC 15.2*   HGB 13.7   HCT 40.3          BMP:   Recent Labs     06/13/22  1700      K 3.8   CL 97*   CO2 21   BUN 25*   CREATININE 1.0   GLUCOSE 191*     LFT's:   Recent Labs     06/13/22  1700   AST 27   ALT 26   BILITOT 0.4   ALKPHOS 95     Troponin:   Recent Labs     06/13/22  1700   TROPONINI <0.01     BNP:No results for input(s): BNP in the last 72 hours. ABGs: No results for input(s): PHART, AFA2MYS, PO2ART in the last 72 hours. INR: No results for input(s): INR in the last 72 hours.     U/A:  Recent Labs     06/13/22  1735   COLORU Yellow   PHUR 5.5   WBCUA 0-2   RBCUA 0-2   MUCUS Rare*   BACTERIA None Seen   CLARITYU CLOUDY* SPECGRAV 1.019   LEUKOCYTESUR Negative   UROBILINOGEN 0.2   BILIRUBINUR Negative   BLOODU Negative   GLUCOSEU 500*   AMORPHOUS 2+       No orders to display       EKG: did not show any acute ischemic changes    ASSESSMENT AND PLAN:   Madelyn Abraham is a 76 y.o. female with PMH as below notable for DM, HTN, Afib, meningioma, Iron deficiency anemia  who presented with AMS. Patient was admitted to the ICU for further workup and management of meningioma with hemorraghic contusion. Acute encephalopaty 2/2 likley agressive/atypical meningioma versus hemorrhagic contusion in the anterior inferior left frontal lobe, rule out seizure  Presented with AMS and urinary incontinence  - Neurochecks Q1H if changes contact NSGY  - Keep HOB >30 degrees   - CT Head w/o contrast 6 hours from previous scan  - MRI Brain w wo r/o brain mass  - Maintain SBP <160; If PRN med insufficient, then may start Nicardipine infusion  - Keep Plt >100k & INR <1.4  - Hold all anticoagulation & antiplatelet  - Seizure prophylaxis: Keppra 1000 mg loading dose then 500 mg BID  -Seizure precautions   - DVT Prophylaxis: SCD's    Suspected anion gap metabolic acidosis  HCO3 21, anion gap 19. UA neg for infection, trace ketones, +gluc and prot. SBP wnl   -Check VBG  -Check lactic acid    Sepsis of unknown origin vs reactive leukocytosis and fever of central origin   Presented with AMS,  leukocytosis, tachycardia, tachypnea, low grade fever. Patient was complaining of severe abdominal pain. UA, CXR neg for infection.   -Continue Cefepime  -Follow up BCxs    HTN  SBP levels between 144-171. Reported labetalol, losartan, hctz in the chart.  -Prn labetalol hydralazine, for SBP >160  -Pharmacy to med rec    T2DM  Last A1c 7.3(2020). On decadron   -LDSS  -hypoglicemia protocol   -f/u BG levels    HLD  -Continue home atorvastatin 80 mg     Iron deficiency anemia   On ferrous sulfate per EMR.    -Hold iron d/t concern of infection   -Monitor BMP     Seizure hx  -Keppra 500 mg BID     Code Status:Full code  FEN: NPO  PPX:  SCDs  DISPO: ICU    This patient will be staffed and discussed with Dr Lisa Chavez   -----------------------------  Nalini Dalton MD, PGY-1  6/13/2022  9:52 PM      Please note that this chart was generated using Dragon dictation software. Although every effort was made to ensure the accuracy of this automated transcription, some errors in transcription may have occurred.

## 2022-06-14 NOTE — CONSULTS
I have seen and personally examined the patient, I have verified all portions of the history and all objective data are accurately documented, and I agree with the assessment and plan documented by NP student. RADHA Valdivia-CNP  Neurosurgery Nurse Practitioner  [phone]          Denisse Deshpande  [id number]   1945 6/14/2022    Requesting physician: Yumiko Oglesby MD    Reason for consultation: Olfactory Meningoma     History of present illness: Patient is a 76 y.o. female w/ PMH of Olfactory meningoma, DM2, HTN, Afib, and LA NENA who presented on 6/14/2022 with altered mental status to the emergency room. Patient was found by her friend after not answering the phone since the night before at 10 PM. When her friend went to see her she found the patient on the ground with urinary incontinence and altered mental status. No witness seizure activity or tongue biting. The friend called EMS. She was then picked up by EMS and was complaining of abdominal pain and brought in for UTI work up for multiple urinary tract infections in the past with a recent admission for left hydronephrosis (4/30/22). Patient denies short of breath. Denies flank pain or back pain. Denies any focal weakness or numbness. No diaphoresis. Head CT scan was ordered in the Emergency Room and showed a 3 x 2 x 1.8cm mass in the anterior inferior left frontal lobe with a density higher than normal brain parenchyma. Patient told me \"I stopped taking my seizure medication and I do not remember why I stopped it\". Patient was previously on seizure prophylaxis but unknown which one she was on or when she stopped taking medication. Patient was loaded with Keppra 1000mg in the ER and continue 500mg BID. Meningoma has been known since 2019 and being surveillance since then. Patient not the best historian and some information was retrieved via chart check. ROS:   GENERAL:  Denies fever or recent illness.  Denies weight changes   EYES:  Denies vision change or diplopia  EARS:  Denies hearing loss  CARDIAC:  Denies chest pain  RESPIRATORY:  Denies shortness of breath  SKIN:  Denies rash or lesions   HEM:  Denies excessive bruising  PSYCH:  Denies anxiety or depression  NEURO:  Denies headache, numbness or tingling or lateralizing weakness   :  Denies urinary difficulty  GI: Denies nausea, vomiting, diarrhea or constipation. Abdominal pain +  MUSCULOSKELETAL:  No arthralgias    No Known Allergies    Past Medical History:   Diagnosis Date    Diabetes mellitus ([address]         No past surgical history on file. Social History     Occupational History    Not on file   Tobacco Use    Smoking status: Never Smoker    Smokeless tobacco: Never Used   Substance and Sexual Activity    Alcohol use: Never    Drug use: Never    Sexual activity: Not on file        No family history on file. No outpatient medications have been marked as taking for the 6/14/22 encounter [facility] Encounter).       Current Facility-Administered Medications   Medication Dose Route Frequency Provider Last Rate Last Admin    sodium chloride flush 0.9 % injection 5-40 mL  5-40 mL IntraVENous 2 times per day Franca Cabrera MD        sodium chloride flush 0.9 % injection 5-40 mL  5-40 mL IntraVENous PRN Franca Cabrera MD        0.9 % sodium chloride infusion   IntraVENous PRN Christie Rosas MD        ondansetron (ZOFRAN-ODT) disintegrating tablet 4 mg  4 mg Oral Q8H PRN Christie Rosas MD        Or    ondansetron [facility]) injection 4 mg  4 mg IntraVENous Q6H PRN Christie Rosas MD        polyethylene glycol [facility]) packet 17 g  17 g Oral Daily PRN Christie Rosas MD        acetaminophen (TYLENOL) tablet 650 mg  650 mg Oral Q6H PRN Christie Rosas MD   650 mg at 06/14/22 9094    Or    acetaminophen (TYLENOL) suppository 650 mg  650 mg Rectal Q6H PRN Franca Cabrera MD       [facility] levETIRAcetam (KEPPRA) 500 mg in sodium chloride 0.9 % 100 mL IVPB  500 mg IntraVENous Q12H Christie Rosas MD        hydrALAZINE (APRESOLINE) injection 5 mg  5 mg IntraVENous Q4H PRN Nicolasa Gong MD        labetalol (NORMODYNE;TRANDATE) injection 10 mg  10 mg IntraVENous Q4H PRN Nicolasa Gong MD        glucose chewable tablet 16 g  4 tablet Oral PRN Christie Rosas MD        dextrose bolus 10% 125 mL  125 mL IntraVENous PRN Nicolasa Gong MD        Or    dextrose bolus 10% 250 mL  250 mL IntraVENous PRN Nicolasa Gong MD        glucagon (rDNA) injection 1 mg  1 mg IntraMUSCular PRN Nicolasa Gong MD        dextrose 5 % solution  100 mL/hr IntraVENous PRN Nicolasa Gong MD        metronidazole (FLAGYL) 500 mg in 0.9% NaCl 100 mL IVPB premix  500 mg IntraVENous Gale Walters MD   Stopped at 06/14/22 0459    [START ON 6/15/2022] cefepime (MAXIPIME) 2000 mg IVPB minibag in NS  2,000 mg IntraVENous Q24H Robert Nguyen MD        insulin lispro (1 Unit Dial) 0-12 Units  0-12 Units SubCUTAneous TID WC Christie Rosas MD        insulin lispro (1 Unit Dial) 0-6 Units  0-6 Units SubCUTAneous Nightly Christie Rosas MD        dexamethasone (DECADRON) injection 4 mg  4 mg IntraVENous Q6H Laz Seals, APRN - NP        pantoprazole (PROTONIX) tablet 40 mg  40 mg Oral QAM AC Laz Seals, APRN - NP   40 mg at 06/14/22 0700    potassium chloride 10 mEq/100 mL IVPB (Peripheral Line)  10 mEq IntraVENous Q1H Percy Salomon MD          Objective:  /60   Pulse 75   Temp 98.2 °F (36.8 °C) (Oral)   Resp 16   Ht 5' 5\" (1.651 m)   Wt 172 lb 6.4 oz (78.2 kg)   SpO2 93%   BMI 28.69 kg/m²     Physical Exam:  Patient seen and examined   General: Well developed. Drowsy and cooperative in no acute distress.      HENT: atraumatic, neck supple  Eyes: Optic discs: Not tested  Pulmonary: unlabored respiratory 14.5*   RBC 4.78 4.38         Patient Active Problem List    Diagnosis Date Noted    Brain mass 06/14/2022    Meningioma ([facility] [address] 06/14/2022       Assessment:     76 yoa female came in Altered mental status and urinary incontinence. Known history of left frontal meningioma concerning for seizure . Pt was complaining of headache prior to seizure occurring and non-compliant with previous seizure prophylaxis for unknown period of time. Plan:  1. No emergent neurosurgical intervention indicated  2. Seizure prophylaxis: Keppra 500mg BID  3. MRI Today  4. Neurologic exams: Q4 hour  5. Bowel regimen: glycolax add Senna  6. GI prophylaxis: Protonix  7. Cerebral edema: continue decadron  8. Diet: okay to eat  9. Activity: PT/OT/SLP  10. Pain control: tylenol  11. Consult: Neurology       DISPO- per medical team. Ann Almeida to go to floor from neurosurgery prospective   Abhinav Byrne  Neurosurgery Student Nurse Practitioner  [phone]    Patient was seen and examined with Dr. Jus Loza who agrees with above assessment and plan.      Electronically signed by: Abhinav Byrne, 6/14/2022 8:29 AM

## 2022-06-14 NOTE — PROGRESS NOTES
Progress Note  Admit Date: 6/14/2022         Overnight Events: admitted. CC: F/U for altered mental status, meningioma      Interval History: Pt in ICU, mild confusion. Denies pain, no HA.  sodium chloride flush  5-40 mL IntraVENous 2 times per day    levETIRAcetam  500 mg IntraVENous Q12H    insulin lispro  0-12 Units SubCUTAneous TID WC    insulin lispro  0-6 Units SubCUTAneous Nightly    dexamethasone  4 mg IntraVENous Q6H    pantoprazole  40 mg Oral QAM AC    sennosides-docusate sodium  2 tablet Oral Daily      PRN Medications: sodium chloride flush, sodium chloride, ondansetron **OR** ondansetron, polyethylene glycol, acetaminophen **OR** acetaminophen, hydrALAZINE, labetalol, glucose, dextrose bolus **OR** dextrose bolus, glucagon (rDNA), dextrose  Diet: Diet NPO Exceptions are: Sips of Water with Meds  Continuous Infusions:   sodium chloride      dextrose       PHYSICAL EXAM:  BP (!) 137/59   Pulse 73   Temp 97.5 °F (36.4 °C) (Oral)   Resp 23   Ht 5' 5\" (1.651 m)   Wt 172 lb 6.4 oz (78.2 kg)   SpO2 98%   BMI 28.69 kg/m²   Recent Labs     06/13/22  1658 06/14/22  0323 06/14/22  0807 06/14/22  1151   POCGLU 180* 169* 176* 174*       Intake/Output Summary (Last 24 hours) at 6/14/2022 1356  Last data filed at 6/14/2022 1000  Gross per 24 hour   Intake 10 ml   Output 850 ml   Net -840 ml          Constitutional:       General: sitting comfortably in bed in NAD      Appearance: Normal appearance. HENT:      Head: Normocephalic.      Mouth/Throat:      Mouth: Mucous membranes are moist.   Eyes:      Extraocular Movements: Extraocular movements intact.      Conjunctiva/sclera: Conjunctivae normal.      Pupils: Pupils are equal, round, and reactive to light. Cardiovascular:      Rate and Rhythm: Normal rate and regular rhythm.      Pulses: Normal pulses.      Heart sounds: Normal heart sounds.    Pulmonary:      Effort: Pulmonary effort is normal.      Breath sounds: Normal breath sounds. Abdominal:      General: Abdomen is flat. Bowel sounds are normal.      Palpations: Abdomen is soft.      Tenderness: There is no abdominal tenderness   Musculoskeletal:         General: Normal range of motion.      Cervical back: Normal range of motion. Skin:     General: Skin is warm. Neurological:      General: No focal deficit present.      Mental Status: She is alert and oriented to person, place, and time. Mental status is at baseline. Psychiatric:         Mood and Affect: Mood normal.         Behavior: Behavior normal.       LABS:  Recent Labs     06/13/22  1700 06/14/22  0639   WBC 15.2* 14.5*   HGB 13.7 12.5   HCT 40.3 37.3    339                                                                    Recent Labs     06/13/22  1700 06/14/22  0639    139   K 3.8 3.5   CL 97* 102   CO2 21 22   BUN 25* 23*   CREATININE 1.0 0.9   GLUCOSE 191* 171*     Recent Labs     06/13/22  1700   AST 27   ALT 26   BILITOT 0.4   ALKPHOS 95     Recent Labs     06/13/22  1700 06/14/22  0639   TROPONINI <0.01 <0.01       Assessment & Plan:    Patient Active Problem List:    Acute encephalopaty 2/2 glenn agressive/atypical meningioma versus hemorrhagic contusion in the anterior inferior left frontal lobe, rule out seizure  Presented with AMS and urinary incontinence  - Neurochecks Q1H if changes contact NSGY  - Keep HOB >30 degrees   - MRI Brain w wo r/o brain mass  - Maintain SBP <160; If PRN med insufficient, then may start Nicardipine infusion  - Keep Plt >100k & INR <1.4  - Hold all anticoagulation & antiplatelet  - Seizure prophylaxis: Keppra 1000 mg loading dose then 500 mg BID  -Seizure precautions   - DVT Prophylaxis: SCD's        Sepsis of unknown origin vs reactive leukocytosis and fever of central origin   Presented with AMS,  leukocytosis, tachycardia, tachypnea, low grade fever. Patient was complaining of severe abdominal pain.  UA, CXR neg for infection. Procal negative.    - stop Cefepime and flagyl   -Follow up BCxs     HTN  SBP levels between 144-171. Reported labetalol, losartan, hctz in the chart.  -Prn labetalol hydralazine, for SBP >160  -Pharmacy to med rec     T2DM  Last A1c 7.3(2020). On decadron   -LDSS  -hypoglicemia protocol   -f/u BG levels     HLD  -Continue home atorvastatin 80 mg      Iron deficiency anemia   On ferrous sulfate per EMR. -Hold iron d/t concern of infection   -Monitor BMP      Seizure hx  -Keppra 500 mg BID            Brain mass     Meningioma (HCC)        The patient and / or the family were informed of the results of any tests, a time was given to answer questions, a plan was proposed and they agreed with plan.   Disposition: ICU  Full Code

## 2022-06-14 NOTE — PROGRESS NOTES
4 Eyes Admission Assessment     I agree as the admission nurse that 2 RN's have performed a thorough Head to Toe Skin Assessment on the patient. ALL assessment sites listed below have been assessed on admission. Areas assessed by both nurses: ***  [x]   Head, Face, and Ears   [x]   Shoulders, Back, and Chest  [x]   Arms, Elbows, and Hands   [x]   Coccyx, Sacrum, and Ischium  [x]   Legs, Feet, and Heels        Does the Patient have Skin Breakdown?   No         Yogesh Prevention initiated:  No   Wound Care Orders initiated:  No      [facility] nurse consulted for Pressure Injury (Stage 3,4, Unstageable, DTI, NWPT, and Complex wounds) or Yogesh score 18 or lower:  N/A    Nurse 1 eSignature: Electronically signed by Ricci Goins RN on 6/14/22 at 5:45 AM EDT    **SHARE this note so that the co-signing nurse is able to place an eSignature**    Nurse 2 eSignature: {Esignature:[id number]}

## 2022-06-14 NOTE — PROGRESS NOTES
Physical Therapy  Facility/Department: [facility] ICU  Physical Therapy Initial Assessment and treatment    Name: Sky Garcia  : 1945  MRN: [id number]  Date of Service: 2022    Discharge Recommendations:    Sky Garcia scored a 18/24 on the AM-PAC short mobility form. Current research shows that an AM-PAC score of 18 or greater is typically associated with a discharge to the patient's home setting. Based on the patient's AM-PAC score and their current functional mobility deficits, it is recommended that the patient have 2-3 sessions per week of Physical Therapy at d/c to increase the patient's independence. At this time, this patient demonstrates the endurance and safety to discharge home with home PT and a follow up treatment frequency of 2-3x/wk. Please see assessment section for further patient specific details. HOME HEALTH CARE: LEVEL 1 STANDARD    - Initial home health evaluation to occur within 24-48 hours, in patient home   - Therapy to evaluate with goal of regaining prior level of functioning   - Therapy to evaluate if patient has [id number] needs for personal care      PT Equipment Recommendations  Equipment Needed: No      Patient Diagnosis(es): There were no encounter diagnoses. Past Medical History:  has a past medical history of Diabetes mellitus ([facility] [address] Past Surgical History:  has no past surgical history on file. Assessment   Body Structures, Functions, Activity Limitations Requiring Skilled Therapeutic Intervention: Decreased functional mobility ; Decreased safe awareness;Decreased endurance;Decreased balance  Assessment: Patient tolerated session well, transferring and ambulating in room and hallways as above with mildly unsteady gait without an assistive device. Patient lethargic upon arrival and appears slightly delayed with all mobility and subjective questions. Patient reports independence with ADLs and IADLs prior to admission.   She does have supportive neighbors who can assist as needed upon discharge. Patient reports plan to d/c home with assist as needed. Recommend continued skilled PT upon discharge. Will follow while in acute care setting to maximize independence with mobility. Treatment Diagnosis: impaired mobility associated with brain mass  Therapy Prognosis: Good  Decision Making: Medium Complexity  Requires PT Follow-Up: Yes  Activity Tolerance  Activity Tolerance: Patient tolerated evaluation without incident;Patient limited by fatigue  Activity Tolerance Comments: patient lethargic     Plan   Plan  Plan:  (2-5)  Current Treatment Recommendations: Strengthening,Balance training,Gait training,Functional mobility training,Stair training,Transfer training,Endurance training,Patient/Caregiver education & training,Therapeutic activities,Safety education & training  Safety Devices  Type of Devices: Left in bed,Call light within reach,Gait belt,Nurse notified,Bed alarm in place     Restrictions  Position Activity Restriction  Other position/activity restrictions: up with assistance     Subjective   Pain: patient denies pain throughout session  General  Chart Reviewed: Yes  Patient assessed for rehabilitation services?: Yes  Additional Pertinent Hx: Patient is a 75 y/o female admitted 6/13 with altered mental status and seizure after being found down by friend covered in urine. CT chest (-) for acute findings. CT head (+) for 3.1 cm partially calcified aggressive/atypical meningioma versus hemorrhagiccontusion in the anterior inferior left frontal lobe with local mass effectand surrounding vasogenic edema. No surgical intervention at this time per neurosurgery. Response To Previous Treatment: Not applicable  Family / Caregiver Present: No  Referring Practitioner:  SAULO Conroy  Referral Date : 06/14/22  Diagnosis: brain mass  Follows Commands: Within Functional Limits  General Comment  Comments: Patient supine in bed upon arrival, lethargic but easily aroused with verbal conversation. Subjective  Subjective: Patient agreeable to PT evaluation. Social/Functional History  Social/Functional History  Lives With: Alone  Type of Home: House  Home Layout: Two level,Bed/Bath upstairs,Able to Live on Main level with bedroom/bathroom (half bath on first floor)  Home Access: Level entry  Bathroom Shower/Tub: Tub/Shower unit  Bathroom Toilet: Handicap height  Has the patient had two or more falls in the past year or any fall with injury in the past year?: No  ADL Assistance: Independent  Homemaking Assistance: Independent  Homemaking Responsibilities: Yes  Ambulation Assistance: Independent  Transfer Assistance: Independent  Active : Yes  Occupation: Retired  Type of Occupation: sales  Additional Comments: reports neighbor is able to come help  Vision/Hearing  Vision  Vision: Impaired  Vision Exceptions: Wears glasses at all times  Hearing  Hearing: Within functional limits    Cognition   Orientation  Overall Orientation Status: Impaired (oriented to June 2022)  Orientation Level: Oriented to person;Oriented to place;Oriented to time;Disoriented to situation  Cognition  Overall Cognitive Status: Exceptions  Arousal/Alertness: Delayed responses to stimuli  Following Commands: Follows one step commands with increased time; Follows one step commands with repetition  Attention Span: Attends with cues to redirect  Safety Judgement: Decreased awareness of need for assistance;Decreased awareness of need for safety  Problem Solving: Decreased awareness of errors  Insights: Decreased awareness of deficits  Initiation: Requires cues for some  Sequencing: Requires cues for some     Objective   Heart Rate: 74  Heart Rate Source: Monitor  BP: 135/61  BP Location: Right upper arm  BP Method: Automatic  Patient Position: Semi fowlers  MAP (Calculated): 85.67  Resp: 20  SpO2: 95 %  O2 Device: None (Room air)              AROM RLE (degrees)  RLE AROM: WFL  AROM LLE (degrees)  LLE AROM : WFL  Strength RLE  Strength RLE: WFL  Strength LLE  Strength LLE: WFL           Bed mobility  Supine to Sit: Stand by assistance (head of bed elevated)  Sit to Supine: Supervision (head of bed flat)  Scooting: Supervision (to EOB)  Transfers  Sit to Stand: Stand by assistance (from EOB and from toilet with grab bar)  Stand to sit: Stand by assistance (to toilet and to edge of bed)  Ambulation  Surface: level tile  Device: No Device  Assistance: Contact guard assistance;Minimal assistance (2-3 small losses of balance requiring min assist to correct, primarily CGA)  Quality of Gait: narrow base of support, decreased sharla, 2-3 small mis-steps requiring min assist to correct  Distance: 10' into bathroom, 100' in room and hallways returning to EOB sitting  More Ambulation?: No  Stairs/Curb  Stairs?: No     Balance  Sitting - Static: Good  Sitting - Dynamic: Fair;+ (SBA to don socks)  Standing - Static: Fair;+ (SBA to stand at sink)  Standing - Dynamic: Fair (CGA to ambulate without an assistive device)           OutComes Score                                                  AM-PAC Score  AM-PAC Inpatient Mobility Raw Score : 18 (06/14/22 1619)  AM-PAC Inpatient T-Scale Score : 43.63 (06/14/22 1619)  Mobility Inpatient CMS 0-100% Score: 46.58 (06/14/22 1619)  Mobility Inpatient CMS G-Code Modifier : CK (06/14/22 1619)          Goals  Short Term Goals  Time Frame for Short term goals: discharge  Short term goal 1: patient will perform bed mobility with modified independence  Short term goal 2: patient will perform transfers sit<>stand with supervision  Short term goal 3: patient will ambulate 150' without an assistive device with supervision  Short term goal 4: patient will ascend/descend 12 steps with unilateral handrail and supervision  Patient Goals   Patient goals : none stated       Education  Patient Education  Education Given To: Patient  Education Provided: Role of Therapy;Plan of Care;Transfer Training; Fall Prevention Strategies  Education Method: Verbal  Education Outcome: Verbalized understanding;Continued education needed      Therapy Time   Individual Concurrent Group Co-treatment   Time In 1333         Time Out 1412         Minutes 39         Timed Code Treatment Minutes: 24 Minutes    Timed Code Treatment Minutes:  24 Minutes    Total Treatment Minutes:    24 minutes treatment + 15 minutes evaluation = 39 total treatment minutes    If patient discharges prior to next session this note will serve as a discharge summary. Please see below for the latest assessment towards goals.    [address]

## 2022-06-14 NOTE — PROGRESS NOTES
Progress Note    Admit Date: 6/14/2022  Day: 1  Diet: Diet NPO    CC: AMS    Interval history: NAEON      Medications:     Scheduled Meds:   sodium chloride flush  5-40 mL IntraVENous 2 times per day    levETIRAcetam  500 mg IntraVENous Q12H    metroNIDAZOLE  500 mg IntraVENous Q8H    [START ON 6/15/2022] cefepime  2,000 mg IntraVENous Q24H    insulin lispro  0-12 Units SubCUTAneous TID WC    insulin lispro  0-6 Units SubCUTAneous Nightly    dexamethasone  4 mg IntraVENous Q6H    pantoprazole  40 mg Oral QAM AC     Continuous Infusions:   sodium chloride      dextrose       PRN Meds:sodium chloride flush, sodium chloride, ondansetron **OR** ondansetron, polyethylene glycol, acetaminophen **OR** acetaminophen, hydrALAZINE, labetalol, glucose, dextrose bolus **OR** dextrose bolus, glucagon (rDNA), dextrose    Objective:   Vitals:   T-max:  Patient Vitals for the past 8 hrs:   BP Temp Temp src Pulse Resp SpO2 Height Weight   06/14/22 0900 (!) 140/60 -- -- 72 23 96 % -- --   06/14/22 0800 (!) 113/56 97.5 °F (36.4 °C) Oral 71 14 93 % -- --   06/14/22 0700 131/60 -- -- 75 16 93 % -- --   06/14/22 0600 (!) 117/50 -- -- 66 18 91 % -- --   06/14/22 0540 -- -- -- -- -- -- 5' 5\" (1.651 m) 172 lb 6.4 oz (78.2 kg)   06/14/22 0500 (!) 128/53 -- -- 74 14 95 % -- --   06/14/22 0400 (!) 123/51 98.2 °F (36.8 °C) Oral 78 17 93 % -- --   06/14/22 0300 (!) 134/50 -- -- 76 (!) 34 97 % -- --   06/14/22 0215 -- -- -- 83 (!) 33 100 % -- --       Intake/Output Summary (Last 24 hours) at 6/14/2022 1006  Last data filed at 6/14/2022 0907  Gross per 24 hour   Intake 10 ml   Output 850 ml   Net -840 ml       Review of Systems  Patient states abdominal tenderness has completely resolved. Physical Exam    Constitutional:       General: sitting comfortably in bed in NAD      Appearance: Normal appearance. HENT:      Head: Normocephalic.       Mouth/Throat:      Mouth: Mucous membranes are moist.   Eyes:      Extraocular Movements: Extraocular movements intact. Conjunctiva/sclera: Conjunctivae normal.      Pupils: Pupils are equal, round, and reactive to light. Cardiovascular:      Rate and Rhythm: Normal rate and regular rhythm. Pulses: Normal pulses. Heart sounds: Normal heart sounds. Pulmonary:      Effort: Pulmonary effort is normal.      Breath sounds: Normal breath sounds. Abdominal:      General: Abdomen is flat. Bowel sounds are normal.      Palpations: Abdomen is soft. Tenderness: There is no abdominal tenderness   Musculoskeletal:         General: Normal range of motion. Cervical back: Normal range of motion. Skin:     General: Skin is warm. Neurological:      General: No focal deficit present. Mental Status: She is alert and oriented to person, place, and time. Mental status is at baseline. Psychiatric:         Mood and Affect: Mood normal.         Behavior: Behavior normal.     LABS:    CBC:   Recent Labs     06/13/22  1700 06/14/22  0639   WBC 15.2* 14.5*   HGB 13.7 12.5   HCT 40.3 37.3    339   MCV 84.4 85.1     Renal:    Recent Labs     06/13/22 1700 06/14/22  0639    139   K 3.8 3.5   CL 97* 102   CO2 21 22   BUN 25* 23*   CREATININE 1.0 0.9   GLUCOSE 191* 171*   CALCIUM 10.4 9.4   MG  --  2.00   ANIONGAP 19* 15     Hepatic:   Recent Labs     06/13/22  1700   AST 27   ALT 26   BILITOT 0.4   PROT 8.2   LABALBU 4.5   ALKPHOS 95     Troponin:   Recent Labs     06/13/22  1700 06/14/22  0639   TROPONINI <0.01 <0.01     BNP: No results for input(s): BNP in the last 72 hours. Lipids: No results for input(s): CHOL, HDL in the last 72 hours. Invalid input(s): LDLCALCU, TRIGLYCERIDE  ABGs:  No results for input(s): PHART, QTV9ZQE, PO2ART, VLJ4GXN, BEART, THGBART, Q9QUMOXN, KUV6NBG in the last 72 hours. INR: No results for input(s): INR in the last 72 hours.   Lactate: No results for input(s): LACTATE in the last 72 hours.  Cultures:  -----------------------------------------------------------------  RAD:   CTA ABDOMEN PELVIS W CONTRAST   Final Result   1. Moderate atherosclerotic changes involving a nondilated    abdominal aorta. No aneurysm, dissection, or thrombus is seen. 2.  The urinary bladder is mildly dilated measuring 15 cm    craniocaudad. If the patient is unable to void, consider urinary    retention. 3.  Mild diverticulosis of the colon but nondilated bowel loops    are seen. No signs of acute diverticulitis or acute inflammatory    changes involving the bowel. MRI BRAIN W WO CONTRAST    (Results Pending)       Assessment/Plan:   Cassia Carter is a 76 y.o. female with PMH as below notable for DM, HTN, Afib, meningioma, Iron deficiency anemia  who presented with AMS. Patient was admitted to the ICU for further workup and management of meningioma with hemorraghic contusion.      [facility] versus hemorrhagic contusion in the anterior inferior left frontal lobe, rule out seizure  Presented with AMS and urinary incontinence  - Neurochecks Q1H if changes contact NSGY  - Keep HOB >30 degrees   - MRI Brain w wo r/o brain mass  - Maintain SBP <160; If PRN med insufficient, then may start Nicardipine infusion  - Keep Plt >100k & INR <1.4  - Hold all anticoagulation & antiplatelet  - Seizure prophylaxis: Keppra 1000 mg loading dose then 500 mg BID  -Seizure precautions   - DVT Prophylaxis: SCD's        Sepsis of unknown origin vs reactive leukocytosis and fever of central origin   Presented with AMS,  leukocytosis, tachycardia, tachypnea, low grade fever. Patient was complaining of severe abdominal pain. UA, CXR neg for infection. Procal negative. - stop Cefepime and flagyl   -Follow up BCxs     HTN  SBP levels between 144-171.  Reported labetalol, losartan, hctz in the chart.  -Prn labetalol hydralazine, for SBP >160  -Pharmacy to med rec     T2DM  Last A1c 7.3(2020). On decadron   -LDSS  -hypoglicemia protocol   -f/u BG levels     HLD  -Continue home atorvastatin 80 mg      Iron deficiency anemia   On ferrous sulfate per EMR. -Hold iron d/t concern of infection   -Monitor BMP      Seizure hx  -Keppra 500 mg BID      Code Status:Full code  FEN: NPO  PPX:  SCDs  DISPO: ICU    Ananth Pittman MD, PGY-1  06/14/22  10:06 AM    This patient has been staffed and discussed with Dr. Camila Ramos MD.     [facility]    Patient seen and examined. I agree with Dr. Vikki Schirmer history, physical, lab findings, assessment and plan. Patient transferred to [facility] ICU for evaluation of left frontal mass in the setting of confusion with bladder incontinence. She has a known left frontal meningioma measuring 3.4 cm on imaging at [facility], last performed February 2021. Current imaging here shows a 3 x 2 x 1.8 cm left frontal mass with some vasogenic edema. She has no known seizure disorder and is not on any antiepileptics currently but appears to have been on seizure prophylaxis in the past.  In outside ER she was complaining of abdominal pain that has since resolved. She has a a leukocytosis of 14.5 which is unchanged from May 2, 2022. Urinalysis is unremarkable as is chest x-ray. CT abdomen shows some mild diverticulosis but otherwise nothing acute. At this time she does not appear to have an ongoing infection and antibiotics will be discontinued    Currently she is alert and oriented x4 and denies any pain. She is afebrile here with a blood pressure of 135/61 pulse 74 and oxygen saturation 95% on room air. She is had no seizure activity here    She currently is on Decadron 4 mg IV every 6 hours and Keppra 500 mg IV every 12 hours. Neurosurgery has evaluated the patient and is recommending MRI of brain. No neurosurgical intervention needed at this time and neurologic exams can be changed to every 4 hours.   Neurology is also evaluating her for possible seizures    Will resume her home Norvasc 10 mg daily and losartan 100 mg daily. Will we add hydrochlorothiazide and atenolol when needed  Hold metformin for now and cover with sliding scale insulin. May need Lantus if Decadron cause a spike in blood sugars  Resume Xarelto for A. fib when ensured a procedure is not needed    Diabetic diet has been started    Okay to transfer to [address] [place] [address].   Will sign off    Siobhan Bowman MD

## 2022-06-14 NOTE — PROGRESS NOTES
Patient has not voided since straight cath. Bladder scan performed, 443 cc present. Patient states that she does not feel the urge to urinate or that her bladder feels full.

## 2022-06-15 VITALS
TEMPERATURE: 97.6 F | HEIGHT: 65 IN | SYSTOLIC BLOOD PRESSURE: 152 MMHG | RESPIRATION RATE: 18 BRPM | DIASTOLIC BLOOD PRESSURE: 66 MMHG | OXYGEN SATURATION: 95 % | BODY MASS INDEX: 28.72 KG/M2 | HEART RATE: 88 BPM | WEIGHT: 172.4 LBS

## 2022-06-15 LAB
GLUCOSE BLD-MCNC: 163 MG/DL (ref 70–99)
GLUCOSE BLD-MCNC: 171 MG/DL (ref 70–99)
GLUCOSE BLD-MCNC: 288 MG/DL (ref 70–99)
PERFORMED ON: ABNORMAL

## 2022-06-15 PROCEDURE — 6370000000 HC RX 637 (ALT 250 FOR IP): Performed by: INTERNAL MEDICINE

## 2022-06-15 PROCEDURE — 6360000002 HC RX W HCPCS

## 2022-06-15 PROCEDURE — 6370000000 HC RX 637 (ALT 250 FOR IP): Performed by: NURSE PRACTITIONER

## 2022-06-15 PROCEDURE — 6360000002 HC RX W HCPCS: Performed by: NURSE PRACTITIONER

## 2022-06-15 PROCEDURE — G0378 HOSPITAL OBSERVATION PER HR: HCPCS

## 2022-06-15 PROCEDURE — 6360000002 HC RX W HCPCS: Performed by: INTERNAL MEDICINE

## 2022-06-15 PROCEDURE — 2580000003 HC RX 258

## 2022-06-15 PROCEDURE — 97535 SELF CARE MNGMENT TRAINING: CPT

## 2022-06-15 PROCEDURE — 97530 THERAPEUTIC ACTIVITIES: CPT

## 2022-06-15 PROCEDURE — 99232 SBSQ HOSP IP/OBS MODERATE 35: CPT

## 2022-06-15 PROCEDURE — 96361 HYDRATE IV INFUSION ADD-ON: CPT

## 2022-06-15 RX ORDER — PANTOPRAZOLE SODIUM 40 MG/1
40 TABLET, DELAYED RELEASE ORAL
Qty: 30 TABLET | Refills: 0 | Status: SHIPPED | OUTPATIENT
Start: 2022-06-16 | End: 2022-07-29 | Stop reason: ALTCHOICE

## 2022-06-15 RX ORDER — LEVETIRACETAM 500 MG/1
500 TABLET ORAL 2 TIMES DAILY
Qty: 60 TABLET | Refills: 1 | Status: SHIPPED | OUTPATIENT
Start: 2022-06-15

## 2022-06-15 RX ORDER — LEVETIRACETAM 500 MG/1
500 TABLET ORAL 2 TIMES DAILY
Status: DISCONTINUED | OUTPATIENT
Start: 2022-06-15 | End: 2022-06-15 | Stop reason: HOSPADM

## 2022-06-15 RX ORDER — DEXAMETHASONE 4 MG/1
TABLET ORAL
Qty: 12 TABLET | Refills: 0 | Status: SHIPPED | OUTPATIENT
Start: 2022-06-15 | End: 2022-06-22

## 2022-06-15 RX ORDER — DEXAMETHASONE 4 MG/1
4 TABLET ORAL EVERY 8 HOURS SCHEDULED
Status: DISCONTINUED | OUTPATIENT
Start: 2022-06-15 | End: 2022-06-15 | Stop reason: HOSPADM

## 2022-06-15 RX ADMIN — INSULIN LISPRO 6 UNITS: 100 INJECTION, SOLUTION INTRAVENOUS; SUBCUTANEOUS at 12:31

## 2022-06-15 RX ADMIN — AMLODIPINE BESYLATE 10 MG: 10 TABLET ORAL at 10:43

## 2022-06-15 RX ADMIN — SENNOSIDES AND DOCUSATE SODIUM 2 TABLET: 50; 8.6 TABLET ORAL at 10:43

## 2022-06-15 RX ADMIN — LOSARTAN POTASSIUM 100 MG: 100 TABLET, FILM COATED ORAL at 10:43

## 2022-06-15 RX ADMIN — LEVETIRACETAM 500 MG: 100 INJECTION, SOLUTION, CONCENTRATE INTRAVENOUS at 11:06

## 2022-06-15 RX ADMIN — INSULIN LISPRO 2 UNITS: 100 INJECTION, SOLUTION INTRAVENOUS; SUBCUTANEOUS at 09:44

## 2022-06-15 RX ADMIN — DEXAMETHASONE 4 MG: 4 TABLET ORAL at 17:15

## 2022-06-15 RX ADMIN — SODIUM CHLORIDE, PRESERVATIVE FREE 10 ML: 5 INJECTION INTRAVENOUS at 10:43

## 2022-06-15 RX ADMIN — SODIUM CHLORIDE: 9 INJECTION, SOLUTION INTRAVENOUS at 11:05

## 2022-06-15 RX ADMIN — PANTOPRAZOLE SODIUM 40 MG: 40 TABLET, DELAYED RELEASE ORAL at 10:49

## 2022-06-15 RX ADMIN — DEXAMETHASONE SODIUM PHOSPHATE 4 MG: 4 INJECTION, SOLUTION INTRAMUSCULAR; INTRAVENOUS at 10:43

## 2022-06-15 RX ADMIN — DEXAMETHASONE SODIUM PHOSPHATE 4 MG: 4 INJECTION, SOLUTION INTRAMUSCULAR; INTRAVENOUS at 03:06

## 2022-06-15 ASSESSMENT — PAIN SCALES - GENERAL: PAINLEVEL_OUTOF10: 0

## 2022-06-15 NOTE — PROGRESS NOTES
NEUROSURGERY PROGRESS NOTE    Edward Walters   [id number]   1945   6/15/2022    Interval History: NAEO, neurologically improved. Hospital Day #1    Subjective: Patient sitting up in bed, abdominal pain resolved. Feels back to her baseline, no acute complaints. Objective:  /72   Pulse 75   Temp 97.6 °F (36.4 °C) (Oral)   Resp (!) 32   Ht 5' 5\" (1.651 m)   Wt 172 lb 6.4 oz (78.2 kg)   SpO2 96%   BMI 28.69 kg/m²     Labs:  Recent Labs     06/13/22  1700 06/14/22  0639    139   CL 97* 102   CO2 21 22   BUN 25* 23*   CREATININE 1.0 0.9   GLUCOSE 191* 171*     Recent Labs     06/13/22  1700 06/14/22  0639   WBC 15.2* 14.5*   RBC 4.78 4.38     MRI BRAIN W WO CONTRAST   Final Result      2.9 x 2.0 x 3.4 cm extra-axial enhancing lesion centered about the left anterior inferior frontal convexity/cribriform plate. This is nonspecific but favored to represent a meningioma. There is adjacent parenchymal vasogenic edema. Mild atrophy and chronic small vessel ischemic change. CTA ABDOMEN PELVIS W CONTRAST   Final Result   1. Moderate atherosclerotic changes involving a nondilated    abdominal aorta. No aneurysm, dissection, or thrombus is seen. 2.  The urinary bladder is mildly dilated measuring 15 cm    craniocaudad. If the patient is unable to void, consider urinary    retention. 3.  Mild diverticulosis of the colon but nondilated bowel loops    are seen. No signs of acute diverticulitis or acute inflammatory    changes involving the bowel.               Neurologic Exam:  GCS:  4 - Opens eyes on own  5 - Alert and oriented  6 - Follows simple motor commands    Mental Status: Awake, alert, oriented x 4, speech clear and appropriate  Language: No aphasia or dysarthria noted  Sensation: Intact to all extremities to light touch  Coordination: Intact    Cranial Nerves:  II: Visual acuity not tested, visual fields intact, denies new visual changes / diplopia  III, IV, VI: PERRL, 3 mm bilaterally, EOMI, no nystagmus noted  V: Facial sensation intact bilaterally to touch  VII: Face symmetric  VIII: Hearing intact bilaterally to spoken voice  IX: Palate movement equal bilaterally  XI: Shoulder shrug equal bilaterally  XII: Tongue midline    Musculoskeletal:   Gait: Not tested   Tone: normal   Motor strength:    Right  Left    Right  Left    Deltoid  5 5  Hip Flex  5 5   Biceps  5 5  Knee Extensors  5 5   Triceps  5 5  Knee Flexors  5 5   Wrist Ext  5 5  Ankle Dorsiflex. 5 5   Wrist Flex  5 5  Ankle Plantarflex. 5 5   Handgrip  5 5  Ext Collins Longus  5 5   Thumb Ext  5 5           Assessment:  75 yo female admitted with AMS and urinary incontinence. Known history of left frontal meningioma, concern for possible seizure.      Plan:  1. No emergent neurosurgical intervention indicated, repeat MRI brain this admission with stable appearing meningioma from previous scan in 2021   2. Neurologic exams: Q4 hour  3. Neurology following for seizure work up, on Keppra BID  4. Cerebral edema: decadron, PPI  5. Diet/activity per primary   6. Patient can continue to follow up as outpatient with planned surveillance imaging, discussed this with her and she will follow up with Dr. Jus Loza. She should restart and continue seizure prophylaxis per neurology and f/u with them as well. We will follow peripherally while in house. Please call with questions/change in neurologic exam.    DISPO-Dispo timing to be determined by primary team once patient is medically stable for discharge. RADHA Sanchez-CNP  Neurosurgery Nurse Practitioner  [phone]  Patient was seen and examined with Dr. Jus Loza who agrees with above assessment and plan. Electronically signed by:  RADHA Pearl NP, 6/15/2022 7:18 AM

## 2022-06-15 NOTE — PROGRESS NOTES
Occupational Therapy  Facility/Department: [facility] ICU  Daily Treatment Note  NAME: Lester Couch  : 1945  MRN: [id number]    Date of Service: 6/15/2022    Assessment: pt appearred to be more alert and oriented this session. pt was able to complete transfers w/ SBA and LB dressing w/ SPVN. pt tolerated functional mobility w/o fatigue. pt was very active prior to admission and reported feeling slower than her normal. Pt lives alone. Recommend pt return home w/ increased assist as needed and [address] Will follow POC throughout stay to maximize endurance and return to PLOF. Discharge Recommendations: Lester Couch scored a 23/24 on the AM-PAC ADL Inpatient form. Current research shows that an AM-PAC score of 18 or greater is typically associated with a discharge to the patient's home setting. Based on the patient's AM-PAC score, and their current ADL deficits, it is recommended that the patient have 2-3 sessions per week of Occupational Therapy at d/c to increase the patient's independence. At this time, this patient demonstrates the endurance and safety to discharge home and a follow up treatment frequency of 2-3x/wk. Please see assessment section for further patient specific details. If patient discharges prior to next session this note will serve as a discharge summary. Please see below for the latest assessment towards goals. OT Equipment Recommendations  Equipment Needed: No      Patient Diagnosis(es): There were no encounter diagnoses. Assessment      Activity Tolerance: Patient tolerated evaluation without incident  Equipment Needed: No      Plan   Plan  Times per Week: 2-5x  Times per Day: Daily  Current Treatment Recommendations: Strengthening;ROM;Balance training; Endurance training;Functional mobility training;Self-Care / ADL; Safety education & training;Cognitive reorientation     Restrictions       Subjective   Subjective  Subjective: pt was sitting on EOB eating breakfast with RN present upon entry. pt was pleasant and cooperative w/ therapy. \"I feel a little slower than I use to\". additional pertinent history: pt is a 76 y.o female admitted on 6/14 after pt was found confused and incontinent. Head CT shows L frontal mass likely c/w meningioma with some mild vasogenic edema. PMHx: DM    Pain: pt denied pain  Orientation  Overall Orientation Status: Within Normal Limits  Cognition  Following Commands: Follows one step commands with increased time  Memory: Decreased short term memory  Safety Judgement: Decreased awareness of need for assistance;Decreased awareness of need for safety  Cognition Comment: mild age related memory and cognitive impairments        Objective    Vitals     Balance  Sitting:  (SPVN sitting EOB)  Standing:  (SBA standing at EOB and at sink. 2 min + 4 min)    Transfer Training  Transfer Training: Yes  Sit to Stand: Stand-by assistance  Stand to Sit: Supervision  Toilet Transfer: Stand-by assistance    Gait  Overall Level of Assistance: Stand-by assistance (walked in hallway and to/from bathroom w/o AD, on feet for 6 min)     ADL  Feeding: Modified independent ;Setup (pt eating breakfast upon entry)  Grooming: Supervision (pt standing at sink for oral care and hand hygiene)  LE Dressing: Supervision (don/doff brief while seated, stood to pull over hips)  Toileting: Stand by assistance        Safety Devices  Type of Devices: Call light within reach;Gait belt;Nurse notified; Left in chair;Chair alarm in place (cord to connect chair alarm to wall not in room, RN aware)     Patient Education  Education Given To: Patient  Education Provided: Role of Therapy;Plan of Care;Transfer Training;ADL Adaptive Strategies  Education Method: Verbal  Barriers to Learning: Cognition  Education Outcome: Verbalized understanding;Continued education needed    Goals  Short Term Goals  Time Frame for Short term goals: by d/c  Short Term Goal 1: complete LB dressing spvn - met 6/15 Complete LB dressing mod I - not met  Short Term Goal 2: increase standing tolerance to 6 min for grooming tasks at sink - met 6/15 Increase standing tolerance to 10 min for grooming tasks at sink  Short Term Goal 3: complete toilet transfer w/ spvn- not met       Therapy Time   Individual Concurrent Group Co-treatment   Time In 0940         Time Out 1014         Minutes 34           Timed Code Tx Min: 34  Total Tx Min: 34    Therapist was present, directed the patient's care, made skilled judgment, and was responsible for assessment and treatment of the patient. If patient is discharged prior to next treatment, this not will serve as the discharge summary.          Bren Roth S/OT

## 2022-06-15 NOTE — PROGRESS NOTES
Speech Language Pathology  Facility/Department: [facility] ICU   CLINICAL BEDSIDE SWALLOW EVALUATION/speech/cognitive screen/DC    NAME: Tigre Dumont  : 1945  MRN: [id number]    ADMISSION DATE: 2022  ADMITTING DIAGNOSIS: has Brain mass and Meningioma ([address] on their problem list.  ONSET DATE:    Recent Chest Xray/CT of Chest: 22  XR CHEST PORTABLE   Final Result   Marginal inspiration, without evidence of acute cardiopulmonary disease           Date of Eval: 2022  Evaluating Therapist: Kingston Hamilton SLP    Current Diet level:  Current Diet : NPO    Primary Complaint  Patient Complaint: None    Pain:  None expressed     Reason for Referral  Tigre Dumont was referred for a bedside swallow evaluation to assess the efficiency of her swallow function, identify signs and symptoms of aspiration and make recommendations regarding safe dietary consistencies, effective compensatory strategies, and safe eating environment. Impression  Dysphagia Diagnosis: Swallow function appears WFL  Dysphagia Impression : Pt presents with swallow function WFL. Pt seated upright in bed with oral cavity noted to be clean and moist. Pt stated at home consumes regular/thin with no difficulty. Pt provided with trials of ice chips, tsp thin, cup sips thin, straw sips thin, puree and regular solids. Pt adequately fed self and readily accepted all PO trials from SLP. Pt appeared to swallow all PO with no overt s/s penetration/aspiration. Adequate oral clearance post swallow with no residue remaining. Based on results of assessment recommend Regular Solids and Thin Liquids. No SLP services are warranted at this time. Please re-consult as needed/appropriate. Dysphagia Outcome Severity Scale: Level 7: Normal in all situations     Informal cognitive-linguistic assessment. Pt alert and oriented x4. Adequate speech/language skills noted to be [facility].  Cognitive skills appropriate and accurate when discussed basic problem solving/safety awareness/insight to reason for hospital admission. No ST needs for cognitive skills at this time. Treatment Plan  Requires SLP Intervention: No     D/C Recommendations: No follow up therapy recommended post discharge     Recommended Diet and Intervention  Recommended Solids: Regular  Recommended Liquids: Thin  Recommended Form of Meds: PO     Compensatory Swallowing Strategies  Compensatory Swallowing Strategies : Upright as possible for all oral intake; Alternate solids and liquids    General  Chart Reviewed: Yes  Subjective  Subjective: Pt seated upright in bed agreeable to SLP evaluation at this time. Behavior/Cognition: Alert; Cooperative;Pleasant mood  Respiratory Status: Room air  O2 Device: None (Room air)  Communication Observation: Functional  Follows Directions: Complex  Dentition: Adequate; Some missing teeth  Patient Positioning: Upright in bed  Baseline Vocal Quality: Normal  Volitional Cough: Strong  Prior Dysphagia History: None  Consistencies Administered: Ice Chips; Thin - teaspoon; Thin - cup; Thin - straw;Pureed;Regular    Vision/Hearing  Vision  Vision Exceptions: Wears glasses at all times  Hearing  Hearing: Within functional limits    Oral Motor Deficits  Oral/Motor  Oral Hygiene: Clean;Moist  Gag: No Impairment    Oral Phase Dysfunction  Oral Phase  Oral Phase: WNL  Oral Phase  Oral Phase - Comment: WNL     Indicators of Pharyngeal Phase Dysfunction   Pharyngeal Phase   Pharyngeal Phase: Presumed WNL    Prognosis  Individuals consulted  Consulted and agree with results and recommendations: Patient;RN  RN Name: Lou Baron  Patient Education: Educated on rationale for SLP evaluation and recommendations  Patient Education Response: Verbalizes understanding  Safety Devices in place: Yes  Type of devices: All fall risk precautions in place; Bed alarm in place; Left in bed;Call light within reach       Therapy Time  SLP Individual Minutes  Time In: 6617  Time Out: 2614  Minutes: 20     SLP Total Treatment Time  Total Treatment Time: 20    Electronically signed by:  James Cavazos M.A., [address] [facility]  Speech-Language Pathologist [[email or web address]] : Recommend using mist from a humidifier. Allow the child to breathe cool air during the night by opening a window or door. Fever can be treated with an over-the-counter medication such as acetaminophen or ibuprofen. Coughing can be treated with warm, clear fluids to loosen mucus on the vocal cords. Warm water, apple juice, or lemonade is safe for children older than four months. Frozen juice popsicles also can be given. Keep the child's head elevated. If the child's stridor does not improve contact health care provider immediately.\par

## 2022-06-15 NOTE — PROGRESS NOTES
Hospitalist Progress Note      PCP: Danita Vasques    Date of Admission: 6/14/2022    Chief Complaint: confusion    Hospital Course:   Amber Salomon is a 76 y.o. female with PMH as below notable for DM, HTN, Afib, meningioma, Iron deficiency anemia  who presented with AMS. Last known normal at 10 PM two nights ago. Her friend found the patient confused, incontinent of urine. Further questioning work up revealed meningioma which was stable but with vasogenic edema. Patient stopped taking antibiotics because she felt good about a year or more back. After resumption of AEDs, and starting dexamethasone patient significantly improved. Initially concern for sepsis on admission, but CRP negative, procalcitonin negative, no acute signs of infection.   Blood cultures are pending    Subjective: Out of bed in chair, no acute complaints, no abdominal pain      Medications:  Reviewed    Infusion Medications    sodium chloride      dextrose       Scheduled Medications    sodium chloride flush  5-40 mL IntraVENous 2 times per day    levETIRAcetam  500 mg IntraVENous Q12H    insulin lispro  0-12 Units SubCUTAneous TID WC    insulin lispro  0-6 Units SubCUTAneous Nightly    dexamethasone  4 mg IntraVENous Q6H    pantoprazole  40 mg Oral QAM AC    sennosides-docusate sodium  2 tablet Oral Daily    amLODIPine  10 mg Oral Daily    losartan  100 mg Oral Daily     PRN Meds: sodium chloride flush, sodium chloride, ondansetron **OR** ondansetron, polyethylene glycol, acetaminophen **OR** acetaminophen, hydrALAZINE, labetalol, glucose, dextrose bolus **OR** dextrose bolus, glucagon (rDNA), dextrose      Intake/Output Summary (Last 24 hours) at 6/15/2022 0841  Last data filed at 6/14/2022 1000  Gross per 24 hour   Intake 10 ml   Output 0 ml   Net 10 ml       Physical Exam Performed:    /72   Pulse 75   Temp 97.6 °F (36.4 °C) (Oral)   Resp (!) 32   Ht 5' 5\" (1.651 m)   Wt 172 lb 6.4 oz (78.2 kg)   SpO2 96% BMI 28.69 kg/m²     General appearance: No apparent distress, appears stated age and cooperative. HEENT: Pupils equal, round, and reactive to light. Conjunctivae/corneas clear. Neck: Supple, with full range of motion. No jugular venous distention. Trachea midline. Respiratory:  Normal respiratory effort. Clear to auscultation, bilaterally without Rales/Wheezes/Rhonchi. Cardiovascular: Regular rate and rhythm with normal S1/S2 without murmurs, rubs or gallops. Abdomen: Soft, non-tender, non-distended with normal bowel sounds. Musculoskeletal: No clubbing, cyanosis or edema bilaterally. Full range of motion without deformity. Skin: Skin color, texture, turgor normal.  No rashes or lesions. Neurologic:  Neurovascularly intact without any focal sensory/motor deficits. Cranial nerves: II-XII intact, grossly non-focal.  Psychiatric: Alert and oriented, thought content appropriate, normal insight  Capillary Refill: Brisk,< 3 seconds   Peripheral Pulses: +2 palpable, equal bilaterally       Labs:   Recent Labs     06/13/22  1700 06/14/22  0639   WBC 15.2* 14.5*   HGB 13.7 12.5   HCT 40.3 37.3    339     Recent Labs     06/13/22  1700 06/14/22  0639    139   K 3.8 3.5   CL 97* 102   CO2 21 22   BUN 25* 23*   CREATININE 1.0 0.9   CALCIUM 10.4 9.4     Recent Labs     06/13/22  1700   AST 27   ALT 26   BILITOT 0.4   ALKPHOS 95     No results for input(s): INR in the last 72 hours.   Recent Labs     06/13/22  1700 06/14/22  0639   CKTOTAL  --  138   TROPONINI <0.01 <0.01       Urinalysis:      Lab Results   Component Value Date    NITRU Negative 06/13/2022    WBCUA 0-2 06/13/2022    BACTERIA None Seen 06/13/2022    RBCUA 0-2 06/13/2022    BLOODU Negative 06/13/2022    SPECGRAV 1.019 06/13/2022    GLUCOSEU 500 06/13/2022    GLUCOSEU NEGATIVE 07/10/2011       Radiology:  MRI BRAIN W WO CONTRAST   Final Result      2.9 x 2.0 x 3.4 cm extra-axial enhancing lesion centered about the left anterior inferior frontal convexity/cribriform plate. This is nonspecific but favored to represent a meningioma. There is adjacent parenchymal vasogenic edema. Mild atrophy and chronic small vessel ischemic change. CTA ABDOMEN PELVIS W CONTRAST   Final Result   1. Moderate atherosclerotic changes involving a nondilated    abdominal aorta. No aneurysm, dissection, or thrombus is seen. 2.  The urinary bladder is mildly dilated measuring 15 cm    craniocaudad. If the patient is unable to void, consider urinary    retention. 3.  Mild diverticulosis of the colon but nondilated bowel loops    are seen. No signs of acute diverticulitis or acute inflammatory    changes involving the bowel. Assessment/Plan:    Active Hospital Problems    Diagnosis Date Noted    Meningioma [facility]) [D32.9] 06/14/2022     Priority: Medium    Nonintractable generalized idiopathic epilepsy without status epilepticus ([facility] [address] [G40.309] 06/14/2022     Priority: Medium    Encephalopathy acute [G93.40] 06/14/2022     Priority: Medium    Diabetes mellitus ([facility] [address] [E11.9]      Priority: Medium     Acute encephalopaty 2/2 likley due to seizure with underlying meningioma versus  Presented with AMS and urinary incontinence  - Neuro checks q 4 hrs  - Keep HOB >30 degrees   - MRI Brain w/ w/o contrast - 2.9 x 2.0 x 3.4 cm extra-axial enhancing lesion centered about the left anterior inferior frontal convexity/cribriform plate. This is nonspecific but favored to represent a meningioma. There is adjacent parenchymal vasogenic edema. Mild atrophy and chronic small vessel ischemic change. Seen by NS and recommended to continue outpatient follow up with Dr. Lieutenant Key.    Recommended to continue AEDs seizure prophylaxis  - Seizure prophylaxis: Keppra 500 mg BID   (SHE NEEDS TO CONTINUE KEPPRA, SHE STOPPED TAKING IT approximately a year or ago)  - Seizure precautions while inpatient, also placed dc instructions for seizure precautions  - DVT Prophylaxis: SCD's     Reactive leukocytosis and fever of central origin, sepsis ruled out  Presented with AMS,  leukocytosis, tachycardia, tachypnea, low grade fever. Patient was complaining of severe abdominal pain.  UA, CXR neg for infection. Procal negative. CRP negative  - continue off abx. Blood Cx results pending     HTN: SBP levels between 144-171. Reported labetalol, losartan, hctz in the chart.  -Prn labetalol hydralazine, for SBP >160  -Pharmacy to med rec     T2DM: Last A1c 7.3(2020). On decadron   -LDSS, hypoglicemia protocol, f/u BG levels and adjust accordingly     HLD: Continue home atorvastatin 80 mg     Iron deficiency anemia: On ferrous sulfate per EMR, continue on dc    DVT Prophylaxis: SCDs  Diet: ADULT DIET;  Regular; 3 carb choices (45 gm/meal)  Code Status: Full Code    PT/OT Eval Status: n/a    Dispo - continue inpatient care, dc tomorrow am once Blood Cx resulted    Zeyad Mcdaniel MD  Internal Medicine Hospitalist

## 2022-06-15 NOTE — DISCHARGE SUMMARY
Hospital Medicine Discharge Summary    Patient ID: Robe Thomas      Patient's PCP: Alonzo Morgan Date: 6/14/2022     Discharge Date:   6/15/2022    Admitting Physician: Juancarlos Garcia MD     Discharge Physician: Chey Mccord MD     Discharge Diagnoses: Active Hospital Problems    Diagnosis Date Noted    Meningioma [facility]) [D32.9] 06/14/2022     Priority: Medium    Nonintractable generalized idiopathic epilepsy without status epilepticus ([facility] [address] [G40.309] 06/14/2022     Priority: Medium    Encephalopathy acute [G93.40] 06/14/2022     Priority: Medium    Diabetes mellitus ([facility] [address] [E11.9]      Priority: Medium       The patient was seen and examined on day of discharge and this discharge summary is in conjunction with any daily progress note from day of discharge. Hospital Course  Chago Swann is a 76 y.o. female with PMH as below notable for DM, HTN, Afib, meningioma, Iron deficiency anemia  who presented with AMS.   Last known normal at 10 PM two nights ago. Her friend found the patient confused, incontinent of urine. Further questioning work up revealed meningioma which was stable but with vasogenic edema. Patient stopped taking antibiotics because she felt good about a year or more back. After resumption of AEDs, and starting dexamethasone patient significantly improved. Initially concern for sepsis on admission, but CRP negative, procalcitonin negative, no acute signs of infection. Blood cultures are pending    Problem list  Acute encephalopaty present on admission 2/2 likley due to seizure with underlying meningioma; MRI Brain w/ w/o contrast - 2.9 x 2.0 x 3.4 cm extra-axial enhancing lesion centered about the left anterior inferior frontal convexity/cribriform plate. This is nonspecific but favored to represent a meningioma. There is adjacent parenchymal vasogenic edema. Mild atrophy and chronic small vessel ischemic change.    Seen by NS and recommended to continue outpatient follow up with Dr. Enmanuel Stanton. Recommended to continue AEDs seizure prophylaxis  - Seizure prophylaxis: Keppra 500 mg BID  -Was started on Decadron 4 mg every 6 hours while hospitalized, will start 7-day Decadron taper on discharge, discussed with neurosurgery  (SHE NEEDS TO CONTINUE KEPPRA, SHE STOPPED TAKING IT approximately a year or ago)  - Seizure precautions while inpatient, also placed dc instructions for seizure precautions     Reactive leukocytosis and fever of central origin, sepsis ruled out  Presented with AMS,  leukocytosis, tachycardia, tachypnea, low grade fever. Patient was complaining of severe abdominal pain.  UA, CXR neg for infection. Procal negative. CRP negative  - continue off abx. Blood Cx results pending at the time of discharge     HTN: SBP levels between 144-171. Continue prior to admission medications on discharge    T2DM: Last A1c 7.3(2020). On decadron   -LDSS, hypoglicemia protocol, f/u BG levels and adjust accordingly     HLD: Continue home atorvastatin 80 mg      Iron deficiency anemia: On ferrous sulfate per EMR, continue on dc    Physical Exam Performed:     BP (!) 152/66   Pulse 88   Temp 97.6 °F (36.4 °C) (Oral)   Resp 18   Ht 5' 5\" (1.651 m)   Wt 172 lb 6.4 oz (78.2 kg)   SpO2 95%   BMI 28.69 kg/m²       General appearance:  No apparent distress, appears stated age and cooperative. HEENT:  Normal cephalic, atraumatic without obvious deformity. Pupils equal, round, and reactive to light. Extra ocular muscles intact. Conjunctivae/corneas clear. Neck: Supple, with full range of motion. No jugular venous distention. Trachea midline. Respiratory:  Normal respiratory effort. Clear to auscultation, bilaterally without Rales/Wheezes/Rhonchi. Cardiovascular:  Regular rate and rhythm with normal S1/S2 without murmurs, rubs or gallops. Abdomen: Soft, non-tender, non-distended with normal bowel sounds. Musculoskeletal:  No clubbing, cyanosis or edema bilaterally. Full range of motion without deformity. Skin: Skin color, texture, turgor normal.  No rashes or lesions. Neurologic:  Neurovascularly intact without any focal sensory/motor deficits. Cranial nerves: II-XII intact, grossly non-focal.  Psychiatric:  Alert and oriented, thought content appropriate, normal insight  Capillary Refill: Brisk,< 3 seconds   Peripheral Pulses: +2 palpable, equal bilaterally       Labs: For convenience and continuity at follow-up the following most recent labs are provided:      CBC:    Lab Results   Component Value Date    WBC 14.5 06/14/2022    HGB 12.5 06/14/2022    HCT 37.3 06/14/2022     06/14/2022       Renal:    Lab Results   Component Value Date     06/14/2022    K 3.5 06/14/2022    K 3.8 06/13/2022     06/14/2022    CO2 22 06/14/2022    BUN 23 06/14/2022    CREATININE 0.9 06/14/2022    CALCIUM 9.4 06/14/2022         Significant Diagnostic Studies    Radiology:   MRI BRAIN W WO CONTRAST   Final Result      2.9 x 2.0 x 3.4 cm extra-axial enhancing lesion centered about the left anterior inferior frontal convexity/cribriform plate. This is nonspecific but favored to represent a meningioma. There is adjacent parenchymal vasogenic edema. Mild atrophy and chronic small vessel ischemic change. CTA ABDOMEN PELVIS W CONTRAST   Final Result   1. Moderate atherosclerotic changes involving a nondilated    abdominal aorta. No aneurysm, dissection, or thrombus is seen. 2.  The urinary bladder is mildly dilated measuring 15 cm    craniocaudad. If the patient is unable to void, consider urinary    retention. 3.  Mild diverticulosis of the colon but nondilated bowel loops    are seen. No signs of acute diverticulitis or acute inflammatory    changes involving the bowel.                  Consults:     IP CONSULT TO CRITICAL CARE  IP CONSULT TO PHARMACY  IP CONSULT TO PHARMACY  IP CONSULT TO NEUROLOGY    Disposition: Home    Condition at Discharge: Stable    Discharge Instructions/Follow-up:    Follow-up neurosurgery for surveillance of meningioma  Follow-up with neurology in 2 to 3 months for management of AEDs    Code Status:  Full Code    Activity: activity as tolerated    Diet: Low-sodium diet      Discharge Medications:     Current Discharge Medication List           Details   dexamethasone (DECADRON) 4 MG tablet Take 1 tablet by mouth every 8 hours for 1 day, THEN 1 tablet 2 times daily for 3 days, THEN 1 tablet daily (with breakfast) for 3 days. Qty: 12 tablet, Refills: 0      levETIRAcetam (KEPPRA) 500 MG tablet Take 1 tablet by mouth 2 times daily  Qty: 60 tablet, Refills: 1      pantoprazole (PROTONIX) 40 MG tablet Take 1 tablet by mouth every morning (before breakfast)  Qty: 30 tablet, Refills: 0              Details   atenolol (TENORMIN) 50 MG tablet Take 50 mg by mouth daily      flecainide (TAMBOCOR) 50 MG tablet Take 50 mg by mouth 2 times daily      losartan-hydroCHLOROthiazide (HYZAAR) 100-25 MG per tablet Take 1 tablet by mouth daily      rivaroxaban (XARELTO) 20 MG TABS tablet Take 20 mg by mouth Daily with supper      amLODIPine (NORVASC) 10 MG tablet Take 10 mg by mouth daily      metFORMIN (GLUCOPHAGE-XR) 500 MG extended release tablet Take 1,500 mg by mouth daily (with breakfast)      Multiple Vitamins-Minerals (THERAPEUTIC MULTIVITAMIN-MINERALS) tablet Take 1 tablet by mouth daily      Misc Natural Products (GLUCOSAMINE CHOND MSM FORMULA) TABS Take 1 tablet by mouth daily      vitamin D (CHOLECALCIFEROL) 25 MCG (1000 UT) TABS tablet Take 1,000 Units by mouth daily      ferrous sulfate (FE TABS 325) 325 (65 Fe) MG EC tablet Take 325 mg by mouth 2 times daily      atorvastatin (LIPITOR) 80 MG tablet Take 80 mg by mouth daily             Time Spent on discharge is more than 30 minutes in the examination, evaluation, counseling and review of medications and discharge plan.       Signed:    Cheryl Monroe MD   6/15/2022      Thank you [facility] for the opportunity to be involved in this patient's care. If you have any questions or concerns please feel free to contact me at [phone].

## 2022-06-15 NOTE — PROGRESS NOTES
NEUROLOGY / NEUROCRITICAL CARE PROGRESS NOTE       Patient Name: Eli Mederos YOB: 1945   Sex: Female Age: 76 yrs     CC / Reason for Consult: Poss seizure, known L frontal meningioma w/ edema    Interval Hx / Changes over last 24 hours:   She reports feeling much improved this morning. Oriented x 4. Tells me that after she was found to have meningioma in 2019 she was taking Keppra, maybe refilled it once but hasn't been taking it for quite some time now, reports that she did not have any side effects with the Keppra, she just felt like she didn't need it. No recent medication changes. No drug or alcohol use. She has been sleeping well. Her  passed away in February and reports significant stress related to that, and even had to visit the hospital for anxiety. MRI brain re demonstrated meningioma with vasogenic edema      ROS: No headaches. No focal weakness. No complaints. HISTORY   Admission HPI:   Eli Mederos is a 76 y.o. y/o female with PMH significant for diabetes mellitus and meningioma.      Patient's friend found her confused and incontinent of urine after she was unsuccessful getting a hold of the patient that afternoon. EMS was called, no reports of witnessed seizure activity. She was brought to the ER and a ct head was obtained which demonstrated a 3 x 2 x 1.8 cm mass in the anterior inferior L frontal lobe with adjacent vasogenic edema and minimal shift of the anterior falx. This is a known meningioma (discovered in 2019 after presenting with generalized weakness/malaise, thought to have had unwitnessed seizure and be post ictal), and at one time she was on Keppra. Apparently she reported to Neurosurgery that she stopped taking her seizure medication and she did not remember why. She was given 1000 mg of Keppra in the ER and then 500 mg BID was ordered as maintenance. She was given decadron as well. Most recent MRI brain 2/05/21.  Apparently had multiple UTIs in the recent past.         PMH Past Medical History:   Diagnosis Date    Diabetes mellitus ([facility] [address]       Allergies No Known Allergies   Diet ADULT DIET; Regular; 3 carb choices (45 gm/meal)   Isolation No active isolations     CURRENT SCHEDULED MEDICATIONS   Inpatient Medications     sodium chloride flush, 5-40 mL, IntraVENous, 2 times per day    levETIRAcetam, 500 mg, IntraVENous, Q12H    insulin lispro, 0-12 Units, SubCUTAneous, TID WC    insulin lispro, 0-6 Units, SubCUTAneous, Nightly    dexamethasone, 4 mg, IntraVENous, Q6H    pantoprazole, 40 mg, Oral, QAM AC    sennosides-docusate sodium, 2 tablet, Oral, Daily    amLODIPine, 10 mg, Oral, Daily    losartan, 100 mg, Oral, Daily   Infusions    sodium chloride 10 mL/hr at 06/15/22 1105    dextrose        Antibiotics   Recent Abx Admin      No antibiotic orders with administrations found. LABS   Metabolic Panel Recent Labs     06/13/22  1700 06/14/22  0639    139   K 3.8 3.5   CL 97* 102   CO2 21 22   BUN 25* 23*   CREATININE 1.0 0.9   GLUCOSE 191* 171*   CALCIUM 10.4 9.4   LABALBU 4.5  --    MG  --  2.00   ALKPHOS 95  --    ALT 26  --    AST 27  --       CBC / Coags Recent Labs     06/13/22  1700 06/14/22  0639   WBC 15.2* 14.5*   RBC 4.78 4.38   HGB 13.7 12.5   HCT 40.3 37.3    339      Other No results for input(s): LABA1C, LDLCALC, TRIG, TSH, [id number], FOLATE, LABSALI, COVID19 in the last 72 hours. Recent Labs     06/14/22  0639   LACTA 0.9        DIAGNOSTICS   IMAGES:  Images personally reviewed and agree w/ radiology interpretation. MRI Brain w & w/o Contrast:  Impression       2.9 x 2.0 x 3.4 cm extra-axial enhancing lesion centered about the left anterior inferior frontal convexity/cribriform plate. This is nonspecific but favored to represent a meningioma. There is adjacent parenchymal vasogenic edema.       Mild atrophy and chronic small vessel ischemic change.        Previous Imaging:    Head CT w/o Contrast:  Head CT w/o Contrast:  Impression   A 3 x 2 x 1.8 cm mass in the anterior inferior left frontal lobe is   redemonstrated with a density higher than normal brain parenchyma.  Other   hyperdense areas in the mass suggest calcification.  There is adjacent   vasogenic edema in the white matter and minimal shift of the anterior falx. The CT appearance is unchanged from the very recent exam.  Suspect a   aggressive/atypical meningioma more than traumatic contusion.  MRI with and   without contrast is recommended to confirm the diagnosis and further evaluate.       No other foci of hyperdensity, vasogenic edema, or extra-axial hemorrhage. The ventricles are not dilated.        PHYSICAL EXAMINATION     PHYSICAL EXAM:  Vitals:    06/14/22 1900 06/14/22 2200 06/15/22 0930 06/15/22 1025   BP: (!) 141/51 134/72 (!) 152/66    Pulse: 56 75 88 88   Resp: 15 (!) 32 18 18   Temp:   97.6 °F (36.4 °C)    TempSrc:   Oral    SpO2:   95% 95%   Weight:       Height:             General: Alert, no distress, well-nourished  Neurologic  Mental status:   orientation to person, place, time, situation   Attention intact as able to attend well to the exam     Language fluent in conversation   Comprehension intact; follows simple commands    Cranial nerves:   CN2: Visual fields full w/o extinction on confrontational testing  CN 3,4,6: Pupils equal and reactive to light, extraocular muscles intact  CN5: Facial sensation symmetric   CN7: Face symmetric  CN8: Hearing symmetric to spoken voice  CN9: Palate elevated symmetrically  CN11: Traps full strength on shoulder shrug  CN12: Tongue midline with protrusion    Motor Exam:  Moving all extremities equally through full ROM, 5/5 strength, no pronator drift      Sensory: light touch intact and symmetric in all 4 extremities.    Tone: normal in all 4 extremities      OTHER SYSTEMS:  Cardiovascular: Warm, appears well perfused   Respiratory: Easy, non-labored respiratory pattern   Abdominal: Abdomen is without distention   Extremities: Upper and lower extremities are atraumatic in appearance without deformity. No swelling or erythema. ASSESSMENT & RECOMMENDATIONS   Assessment:  Shirin Corona is a 76 y.o. y/o female with PMH significant for diabetes mellitus and meningioma who presents after neighbor found her altered and incontinent of urine. She has been maintained on Keppra in the setting of suspected seizure and meningioma discovered in 2019, and she has not been taking Keppra for some time. Meningioma stable on MRI. Plan:  - Continue Keppra 500 mg BID  - Decadron per Neurosurgery  - Discussed Seizure Driving Risk: Having a Seizure while driving puts you at risk for injuring yourself or others. If you drive while having uncontrolled seizures, you may be held liable for injuries to others. Do not drive until you have been seizure free for at least 3 months, state laws vary so please check laws in your state. Injury Risk: Please avoid working from [employer], swimming alone or taking baths in a bathtub, use showers only. - Follow up with Neurology for seizure/AED management after discharge  - No further inpatient Neurologic work up      Felicita Harada, APRN - [id number] [facility]   Neurology & Neurocritical Care   Neurology Line: [phone]  [employer]: [facility] Neurology & Neuro Critical [facility] NPs  6/15/2022 11:39 AM    I spent 30 minutes in the care of this patient. Over 50% of that time was in face-to-face counseling regarding disease process, diagnostic testing, preventative measures, and answering patient and family questions.

## 2022-06-15 NOTE — PLAN OF CARE
Problem: Discharge Planning  Goal: Discharge to home or other facility with appropriate resources  Outcome: Completed     Problem: Pain  Goal: Verbalizes/displays adequate comfort level or baseline comfort level  Outcome: Completed     Problem: Safety - Adult  Goal: Free from fall injury  Outcome: Completed     Problem: ABCDS Injury Assessment  Goal: Absence of physical injury  Outcome: Completed     Problem: Chronic Conditions and Co-morbidities  Goal: Patient's chronic conditions and co-morbidity symptoms are monitored and maintained or improved  Outcome: Completed

## 2022-06-15 NOTE — CARE COORDINATION
Case Management Assessment            Discharge Note                    Date / Time of Note: 6/15/2022 1:35 PM                  Discharge Note Completed by: Deepak Babcock RN     Met with Ms. Ren at the bedside to discuss her plan for discharge. She was sitting up at the bedside in a chair. She is alert and oriented x 4, pleasant, bright, and easy to engage in conversation. She will return home this evening. Her neighbor/friend will drive her home. She has other friends and neighbors who are close for support. She feels safe to return home. She is not interested in [facility]. Patient Name: Gemini Jolly   YOB: 1945  Diagnosis: Brain mass [G93.89]  Meningioma [facility]) [D32.9]   Date / Time: 6/14/2022 12:52 AM    Current PCP: Doris Rueda    Advance Directives:  Code Status: Full Code    Financial:  Payor: Ana María Aguirre / Plan: Noemí Thomas ESSENTIAL/PLUS / Product Type: *No Product type* /      Pharmacy:    [place] [phone]  [address] [address] [address]  Phone: [phone] Fax: [phone]    ADLS:  Current PT AM-PAC Score: 18 /24  Current OT AM-PAC Score: 23 /24    DISCHARGE Disposition: Home- No Services Needed    IMM Completed:   Not Indicated    Transportation:  Transportation PLAN for discharge: friend   Mode of Transport: Private Car    Home Care:  Home Care ordered at discharge: No  Declined    The Patient and/or patient representative John Sanchez and her family were provided with a choice of provider and agrees with the discharge plan Yes    Freedom of choice list was provided with basic dialogue that supports the patient's individualized plan of care/goals and shares the quality data associated with the providers.  Yes    Deepak Babcock RN  [facility]  Case Management Department  [phone]

## 2022-06-15 NOTE — CARE COORDINATION
Case Management Assessment           Initial Evaluation                Date / Time of Evaluation: 6/15/2022 9:19 AM                 Assessment Completed by: Paolo Frazier RN    Patient Name: Zurdo Neal     YOB: 1945  Diagnosis: Brain mass [G93.89]  Meningioma [facility]) [D32.9]     Date / Time: 6/14/2022 12:52 AM    Patient Admission Status: Inpatient    If patient is discharged prior to next notation, then this note serves as note for discharge by case management. Current PCP: Neyda العلي    Chart Reviewed: Yes  Patient/ Family Interviewed: Yes    Initial assessment completed at bedside with: pt    Emergency Contacts:  Extended Emergency Contact Information  Primary Emergency Contact: Raphael Urena  Mobile Phone: [phone]  Relation: Neighbor   needed? No  Secondary Emergency Contact: mary edmonds  Work Phone: [phone]  Mobile Phone: [phone]  Relation: Neighbor    Advance Directives:   Code Status: Full Code    Financial  Payor: Chayo Landa / Plan: [id number] Aguirre / Product Type: *No Product type* /     Pre-cert required for SNF: Yes    Pharmacy    [address] [facility] [address] [place] [address] [phone]  [address] [facility] [address]  Phone: [phone] Fax: [phone]      Potential assistance Purchasing Medications: Potential Assistance Purchasing Medications: No  Does Patient want to participate in local refill/ meds to beds program?:   yes  Meds To Beds General Rules:  1. Can ONLY be done Monday- Friday between 8:30am-5pm  2. Prescription(s) must be in pharmacy by 3pm to be filled same day  3. Copy of patient's insurance/ prescription drug card and patient face sheet must be sent along with the prescription(s)  4. Cost of Rx cannot be added to hospital bill. If financial assistance is needed, please contact unit  or ;   or  CANNOT provide pharmacy voucher for patients co-pays  5. Patients can then  the prescription on their way out of the hospital at discharge, or pharmacy can deliver to the bedside if staff is available. (payment due at time of pick-up or delivery - cash, check, or card accepted)     Able to afford home medications/ co-pay costs: Yes    ADLS Independent with self care and functional mobility prior to admission  Support Systems: Friends/Neighbors    PT AM-PAC: 18 /24  OT AM-PAC: 21 /24    [place]: multiple level house alone. Able to live on the main level where there is a bed/bathroom    Plans to RETURN to current housing: Yes    Dilma Carr  Currently ACTIVE with Home Health Care: No    DISCHARGE PLAN:  Disposition: Home with Home Health Care: [facility] referral     Transportation PLAN for discharge: friend     The Patient and/or patient representative Lily Nguyễn and her family were provided with a choice of provider and agrees with the discharge plan Yes    Freedom of choice list was provided with basic dialogue that supports the patient's individualized plan of care/goals and shares the quality data associated with the providers.  Yes      Barb Goodman RN  [facility]  Case Management Department  [phone]

## 2022-06-16 LAB
EKG ATRIAL RATE: 65 BPM
EKG DIAGNOSIS: NORMAL
EKG P AXIS: 25 DEGREES
EKG P-R INTERVAL: 148 MS
EKG Q-T INTERVAL: 464 MS
EKG QRS DURATION: 158 MS
EKG QTC CALCULATION (BAZETT): 482 MS
EKG R AXIS: -57 DEGREES
EKG T AXIS: -8 DEGREES
EKG VENTRICULAR RATE: 65 BPM

## 2022-06-16 PROCEDURE — 93010 ELECTROCARDIOGRAM REPORT: CPT | Performed by: INTERNAL MEDICINE

## 2022-06-17 LAB — BLOOD CULTURE, ROUTINE: NORMAL

## 2022-06-18 LAB — CULTURE, BLOOD 2: NORMAL

## 2022-07-29 NOTE — PROGRESS NOTES
[address] [facility] time___0740_________        Surgery time___0910_________    Take the following medications with a sip of water: Follow your MD/Surgeons pre-procedure instructions regarding your medications     Do not eat or drink anything after 12:00 midnight prior to your surgery. This includes water chewing gum, mints and ice chips. You may brush your teeth and gargle the morning of your surgery, but do not swallow the water     Please see your family doctor/pediatrician for a history and physical and/or concerning medications. H&P 7/27/22 Zuhair Sherman CNP   Bring any test results/reports from your physicians office. If you are under the care of a heart doctor or specialist doctor, please be aware that you may be asked to them for clearance    You may be asked to stop blood thinners such as Coumadin, Plavix, Fragmin, Lovenox, etc., or any anti-inflammatories such as:  Aspirin, Ibuprofen, Advil, Naproxen prior to your surgery. We also ask that you stop any OTC medications such as fish oil, vitamin E, glucosamine, garlic, Multivitamins, COQ 10, etc.    We ask that you do not smoke 24 hours prior to surgery  We ask that you do not  drink any alcoholic beverages 24 hours prior to surgery     You must make arrangements for a responsible adult to take you home after your surgery. For your safety you will not be allowed to leave alone or drive yourself home. Your surgery will be cancelled if you do not have a ride home. Also for your safety, it is strongly suggested that someone stay with you the first 24 hours after your surgery. A parent or legal guardian must accompany a child scheduled for surgery and plan to stay at the hospital until the child is discharged. Please do not bring other children with you. For your comfort, please wear simple loose fitting clothing to the hospital.  Please do not bring valuables.  Wear short sleeve button down more specific instructions according to your surgery. C-Difficile admission screening and protocol:       * Admitted with diarrhea? [] YES    [x]  NO     *Prior history of C-Diff. In last 3 months? [] YES    [x]  NO     *Antibiotic use in the past 6-8 weeks? [x]  NO    []  YES                 If yes, which ANTIBIOTIC AND REASON______     *Prior hospitalization or nursing home in the last month? [x]  YES    []  NO for seizure         SAFETY FIRST. .call before you fall

## 2022-07-29 NOTE — PROGRESS NOTES
Dr. Alexis Vasquez reviewed the patient's medical history and states it is okay for her to have surgery at the surgery center. Patient has a history of seizure last 6/2022 patient had taken self off of her Keppra, a fib, Diabetic,HTN CAD. Liv Graf

## 2022-08-05 ENCOUNTER — ANESTHESIA EVENT (OUTPATIENT)
Dept: SURGERY | Age: 77
End: 2022-08-05
Payer: MEDICARE

## 2022-08-05 NOTE — ANESTHESIA PRE-OP
[address]        Pre-Op Phone Call:     Patient Name: Ana Laura Martinez     Telephone Information:   Mobile [phone]     Home phone:  [phone]    Surgery Time:    9:10 AM     Arrival Time:  7:40     Left extended Message:  Yes     Message left with: Spoke with patient      Recent change in health status:  Yes     Advised of transportation/ policy:  Yes     NPO policy reviewed: Yes     Advised to take morning heart/blood pressure medications with sips of water morning of surgery? Yes     Instructed to bring eye drops, photo identification, and insurance card day of surgery? Yes     Advised to wear short sleeved button down shirt (no T-shirt underneath):  Yes     Advised not to wear jewelry, hairpins, or pantyhose day of surgery? Yes     Advised not to wear make-up and to wash face day of surgery?   Yes    Remarks: Spoke with patient         Electronically signed by:  Munira Mueller RN at 8/5/2022 11:11 AM

## 2022-08-08 ENCOUNTER — HOSPITAL ENCOUNTER (OUTPATIENT)
Age: 77
Setting detail: OUTPATIENT SURGERY
Discharge: HOME | End: 2022-08-08
Attending: OPHTHALMOLOGY | Admitting: OPHTHALMOLOGY
Payer: MEDICARE

## 2022-08-08 ENCOUNTER — ANESTHESIA (OUTPATIENT)
Dept: SURGERY | Age: 77
End: 2022-08-08
Payer: MEDICARE

## 2022-08-08 VITALS
OXYGEN SATURATION: 95 % | WEIGHT: 172 LBS | SYSTOLIC BLOOD PRESSURE: 136 MMHG | BODY MASS INDEX: 33.77 KG/M2 | DIASTOLIC BLOOD PRESSURE: 58 MMHG | TEMPERATURE: 98.3 F | HEIGHT: 60 IN | RESPIRATION RATE: 19 BRPM | HEART RATE: 62 BPM

## 2022-08-08 LAB
GLUCOSE BLD-MCNC: 124 MG/DL (ref 70–99)
GLUCOSE BLD-MCNC: 135 MG/DL (ref 70–99)
PERFORMED ON: ABNORMAL
PERFORMED ON: ABNORMAL

## 2022-08-08 PROCEDURE — 2580000003 HC RX 258: Performed by: STUDENT IN AN ORGANIZED HEALTH CARE EDUCATION/TRAINING PROGRAM

## 2022-08-08 PROCEDURE — 7100000010 HC PHASE II RECOVERY - FIRST 15 MIN: Performed by: OPHTHALMOLOGY

## 2022-08-08 PROCEDURE — 3700000000 HC ANESTHESIA ATTENDED CARE: Performed by: OPHTHALMOLOGY

## 2022-08-08 PROCEDURE — 3600000004 HC SURGERY LEVEL 4 BASE: Performed by: OPHTHALMOLOGY

## 2022-08-08 PROCEDURE — 6370000000 HC RX 637 (ALT 250 FOR IP): Performed by: OPHTHALMOLOGY

## 2022-08-08 PROCEDURE — 2709999900 HC NON-CHARGEABLE SUPPLY: Performed by: OPHTHALMOLOGY

## 2022-08-08 PROCEDURE — 6360000002 HC RX W HCPCS: Performed by: NURSE ANESTHETIST, CERTIFIED REGISTERED

## 2022-08-08 PROCEDURE — 2500000003 HC RX 250 WO HCPCS: Performed by: OPHTHALMOLOGY

## 2022-08-08 PROCEDURE — V2632 POST CHMBR INTRAOCULAR LENS: HCPCS | Performed by: OPHTHALMOLOGY

## 2022-08-08 DEVICE — IMPLANTABLE DEVICE: Type: IMPLANTABLE DEVICE | Site: ANTERIOR CHAMBER | Status: FUNCTIONAL

## 2022-08-08 RX ORDER — SODIUM CHLORIDE 0.9 % (FLUSH) 0.9 %
5-40 SYRINGE (ML) INJECTION PRN
Status: DISCONTINUED | OUTPATIENT
Start: 2022-08-08 | End: 2022-08-08 | Stop reason: HOSPADM

## 2022-08-08 RX ORDER — MIDAZOLAM HYDROCHLORIDE 1 MG/ML
INJECTION INTRAMUSCULAR; INTRAVENOUS PRN
Status: DISCONTINUED | OUTPATIENT
Start: 2022-08-08 | End: 2022-08-08 | Stop reason: SDUPTHER

## 2022-08-08 RX ORDER — SODIUM CHLORIDE 0.9 % (FLUSH) 0.9 %
5-40 SYRINGE (ML) INJECTION PRN
Status: DISCONTINUED | OUTPATIENT
Start: 2022-08-08 | End: 2022-08-08 | Stop reason: SDUPTHER

## 2022-08-08 RX ORDER — CYCLOPENTOLATE HYDROCHLORIDE 10 MG/ML
1 SOLUTION/ DROPS OPHTHALMIC SEE ADMIN INSTRUCTIONS
Status: DISCONTINUED | OUTPATIENT
Start: 2022-08-08 | End: 2022-08-08 | Stop reason: HOSPADM

## 2022-08-08 RX ORDER — SODIUM CHLORIDE 9 MG/ML
INJECTION, SOLUTION INTRAVENOUS PRN
Status: CANCELLED | OUTPATIENT
Start: 2022-08-08

## 2022-08-08 RX ORDER — LIDOCAINE HYDROCHLORIDE 10 MG/ML
INJECTION, SOLUTION EPIDURAL; INFILTRATION; INTRACAUDAL; PERINEURAL
Status: COMPLETED | OUTPATIENT
Start: 2022-08-08 | End: 2022-08-08

## 2022-08-08 RX ORDER — TETRACAINE HYDROCHLORIDE 5 MG/ML
SOLUTION OPHTHALMIC
Status: COMPLETED | OUTPATIENT
Start: 2022-08-08 | End: 2022-08-08

## 2022-08-08 RX ORDER — SODIUM CHLORIDE 9 MG/ML
INJECTION, SOLUTION INTRAVENOUS PRN
Status: DISCONTINUED | OUTPATIENT
Start: 2022-08-08 | End: 2022-08-08 | Stop reason: HOSPADM

## 2022-08-08 RX ORDER — CIPROFLOXACIN HYDROCHLORIDE 3.5 MG/ML
1 SOLUTION/ DROPS TOPICAL SEE ADMIN INSTRUCTIONS
Status: DISCONTINUED | OUTPATIENT
Start: 2022-08-08 | End: 2022-08-08 | Stop reason: HOSPADM

## 2022-08-08 RX ORDER — SODIUM CHLORIDE 0.9 % (FLUSH) 0.9 %
5-40 SYRINGE (ML) INJECTION EVERY 12 HOURS SCHEDULED
Status: DISCONTINUED | OUTPATIENT
Start: 2022-08-08 | End: 2022-08-08 | Stop reason: HOSPADM

## 2022-08-08 RX ORDER — TROPICAMIDE 10 MG/ML
1 SOLUTION/ DROPS OPHTHALMIC SEE ADMIN INSTRUCTIONS
Status: DISCONTINUED | OUTPATIENT
Start: 2022-08-08 | End: 2022-08-08 | Stop reason: HOSPADM

## 2022-08-08 RX ORDER — KETOROLAC TROMETHAMINE 5 MG/ML
1 SOLUTION OPHTHALMIC SEE ADMIN INSTRUCTIONS
Status: DISCONTINUED | OUTPATIENT
Start: 2022-08-08 | End: 2022-08-08 | Stop reason: HOSPADM

## 2022-08-08 RX ORDER — BALANCED SALT SOLUTION 6.4; .75; .48; .3; 3.9; 1.7 MG/ML; MG/ML; MG/ML; MG/ML; MG/ML; MG/ML
SOLUTION OPHTHALMIC
Status: COMPLETED | OUTPATIENT
Start: 2022-08-08 | End: 2022-08-08

## 2022-08-08 RX ORDER — PHENYLEPHRINE HCL 2.5 %
1 DROPS OPHTHALMIC (EYE) SEE ADMIN INSTRUCTIONS
Status: DISCONTINUED | OUTPATIENT
Start: 2022-08-08 | End: 2022-08-08 | Stop reason: HOSPADM

## 2022-08-08 RX ORDER — BRIMONIDINE TARTRATE 0.15 %
DROPS OPHTHALMIC (EYE)
Status: COMPLETED | OUTPATIENT
Start: 2022-08-08 | End: 2022-08-08

## 2022-08-08 RX ORDER — TETRACAINE HYDROCHLORIDE 5 MG/ML
1 SOLUTION OPHTHALMIC SEE ADMIN INSTRUCTIONS
Status: DISCONTINUED | OUTPATIENT
Start: 2022-08-08 | End: 2022-08-08 | Stop reason: HOSPADM

## 2022-08-08 RX ORDER — SODIUM CHLORIDE 0.9 % (FLUSH) 0.9 %
5-40 SYRINGE (ML) INJECTION EVERY 12 HOURS SCHEDULED
Status: DISCONTINUED | OUTPATIENT
Start: 2022-08-08 | End: 2022-08-08 | Stop reason: SDUPTHER

## 2022-08-08 RX ORDER — SODIUM CHLORIDE 9 MG/ML
INJECTION, SOLUTION INTRAVENOUS PRN
Status: DISCONTINUED | OUTPATIENT
Start: 2022-08-08 | End: 2022-08-08 | Stop reason: SDUPTHER

## 2022-08-08 RX ORDER — SODIUM CHLORIDE 0.9 % (FLUSH) 0.9 %
5-40 SYRINGE (ML) INJECTION PRN
Status: CANCELLED | OUTPATIENT
Start: 2022-08-08

## 2022-08-08 RX ORDER — SODIUM CHLORIDE 9 MG/ML
INJECTION, SOLUTION INTRAVENOUS CONTINUOUS
Status: DISCONTINUED | OUTPATIENT
Start: 2022-08-08 | End: 2022-08-08 | Stop reason: HOSPADM

## 2022-08-08 RX ORDER — SODIUM CHLORIDE 0.9 % (FLUSH) 0.9 %
5-40 SYRINGE (ML) INJECTION EVERY 12 HOURS SCHEDULED
Status: CANCELLED | OUTPATIENT
Start: 2022-08-08

## 2022-08-08 RX ADMIN — MIDAZOLAM 1 MG: 1 INJECTION INTRAMUSCULAR; INTRAVENOUS at 09:15

## 2022-08-08 RX ADMIN — TROPICAMIDE 1 DROP: 10 SOLUTION/ DROPS OPHTHALMIC at 08:30

## 2022-08-08 RX ADMIN — TROPICAMIDE 1 DROP: 10 SOLUTION/ DROPS OPHTHALMIC at 08:25

## 2022-08-08 RX ADMIN — PHENYLEPHRINE HYDROCHLORIDE 1 DROP: 25 SOLUTION/ DROPS OPHTHALMIC at 08:30

## 2022-08-08 RX ADMIN — CIPROFLOXACIN 1 DROP: 3 SOLUTION OPHTHALMIC at 08:30

## 2022-08-08 RX ADMIN — LIDOCAINE HYDROCHLORIDE: 35 GEL OPHTHALMIC at 08:25

## 2022-08-08 RX ADMIN — TETRACAINE HYDROCHLORIDE 1 DROP: 5 SOLUTION OPHTHALMIC at 08:25

## 2022-08-08 RX ADMIN — KETOROLAC TROMETHAMINE 1 DROP: 0.5 SOLUTION OPHTHALMIC at 08:25

## 2022-08-08 RX ADMIN — KETOROLAC TROMETHAMINE 1 DROP: 0.5 SOLUTION OPHTHALMIC at 08:30

## 2022-08-08 RX ADMIN — CYCLOPENTOLATE HYDROCHLORIDE 1 DROP: 10 SOLUTION OPHTHALMIC at 08:25

## 2022-08-08 RX ADMIN — CIPROFLOXACIN 1 DROP: 3 SOLUTION OPHTHALMIC at 08:25

## 2022-08-08 RX ADMIN — SODIUM CHLORIDE: 9 INJECTION, SOLUTION INTRAVENOUS at 08:29

## 2022-08-08 RX ADMIN — CYCLOPENTOLATE HYDROCHLORIDE 1 DROP: 10 SOLUTION OPHTHALMIC at 08:30

## 2022-08-08 RX ADMIN — PHENYLEPHRINE HYDROCHLORIDE 1 DROP: 25 SOLUTION/ DROPS OPHTHALMIC at 08:25

## 2022-08-08 RX ADMIN — MIDAZOLAM 1 MG: 1 INJECTION INTRAMUSCULAR; INTRAVENOUS at 09:13

## 2022-08-08 ASSESSMENT — PAIN SCALES - GENERAL
PAINLEVEL_OUTOF10: 0
PAINLEVEL_OUTOF10: 0

## 2022-08-08 ASSESSMENT — PAIN - FUNCTIONAL ASSESSMENT: PAIN_FUNCTIONAL_ASSESSMENT: 0-10

## 2022-08-08 NOTE — OP NOTE
[place] [address]  [phone]      8/8/2022    Patient name: Michele Serrano  YOB: 1945  MRN: [id number]    Allergies: No Known Allergies    Pre-operative diagnosis:  Cataract Left Eye    Post-operative diagnosis:  Same    Procedure Performed:  Phacoemulsification with insertion of a foldable posterior chamber intraocular lens implant to the left eye. Anesthesia:  Topical Anesthesia with MAC. Estimated Blood Loss: None    Specimens removed: None    Limbal Relaxing Incisions:  Axis:N/A    Arc Length: N/A    Complications:  None    Description of Procedure: In the same day surgery center, the patient was given the usual pre-operative regimen. In the operating room suite, the left eye was prepped and draped in the usual sterile fashion. A paracentesis tract was fashioned, after which 0.5 MI of 1% preservative free Lidocaine was injected into the anterior chamber followed by Viscoat for a complete chamber fill. A clear cornea temporal tunnel was created using a keratome. Under optimal magnification and visualization, a continuous curvilinear capsulorrhexis was performed. Hydro-dissection of the lens was carried out using balanced salt solution. The lens was then phacoemulsified using the divide and conquer technique. Residual cortex was removed using the I/A handpiece followed by thorough posterior capsule polishing. The capsular bag was then filled with Provisc and the lens was gently delivered into the bag, achieving excellent centration. . Provisc was removed using the I/A handpiece and the globe was pressurized using balanced salt solution. The paracentesis tract and the temporal wound were both checked and found to be watertight. The speculum was removed and Betadine 5% was instilled. The patient tolerated the procedure well and was taken to the same day surgery suite in stable condition.  Post-operative instructions and follow-up care were arranged.        Electronically signed by: Wilfredo Modi MD,8/8/2022,9:34 AM

## 2022-08-08 NOTE — ANESTHESIA POSTPROCEDURE EVALUATION
Department of Anesthesiology  Postprocedure Note    Patient: Taiwo Holder  MRN: [id number]  YOB: 1945  Date of evaluation: 8/8/2022      Procedure Summary     Date: 08/08/22 Room / Location: [address]    Anesthesia Start: 0912 Anesthesia Stop: 7079    Procedure: PHACOEMULSIFICATION WITH INTRAOCULAR LENS IMPLANT - LEFT EYE (Left: Eye) Diagnosis:       Nuclear sclerotic cataract of left eye      (Nuclear sclerotic cataract of left eye)    Surgeons: Mira Suarez MD Responsible Provider: Ping Kimble MD    Anesthesia Type: MAC ASA Status: 3          Anesthesia Type: No value filed.     Disha Phase I: Disha Score: 10    Disha Phase II: Disha Score: 10      Anesthesia Post Evaluation    Patient location during evaluation: PACU  Patient participation: complete - patient participated  Level of consciousness: awake and alert  Pain score: 0  Airway patency: patent  Nausea & Vomiting: no nausea and no vomiting  Complications: no  Cardiovascular status: blood pressure returned to baseline  Respiratory status: acceptable  Hydration status: euvolemic

## 2022-08-08 NOTE — DISCHARGE INSTRUCTIONS
[facility] [address]   [phone]       Post-Operative Instructions for Day of Cataract Surgery    2022      Patient Name: Kimo Eastman  : 1945  MRN: [id number]    Use the Antibiotic Drop {Blank single:18123::\"VIgamox\",\"Ofloxacin\"} one drop in the OPERATED left eye THREE more times today. Use the NSAID Drop {Blank single:30490::\"Prolensa\",\"Bromsite\"} one drop in the OPERATED left eye ONE more time today. Use the STEROID Drop {Blank single:72983::\"Pred Forte\",\"Prednisolone\"} one drop in the OPERATED left eye THREE more times today. You may watch TV, walk, and do routine chores. Avoid heavy exertion or straining. Wear shield at bedtime for TWO nights. You may take Tylenol or Advil for mild irritation or pain. If you have pain worse than what Tylenol or Advil can manage, call your physician  Your next appointment is *** @ *** at the {Blank single:85126::\"[place]\",\"[place]\",\"[facility] [address]\"} location. Remember to bring your eye medications/kit to the office. If your next day appointment is in the afternoon, then use the Antibiotic, Anti-inflammatory, and {Blank single:57071::\"Durezol\",\"Prednisolone\"} drops once each that morning. General Post-Operative Instructions for Cataract Surgery    COMFORT - Your eye may feel irritated (scratchy, stinging, or achy) this is normal.   DIET - You may resume your regular diet, no alcohol for 24 hours. ACTIVITY - Do not lift anything over 25 pounds today, nothing over 50 pounds for the first week. No driving for 24 hours, Do not make any important decisions or sign legal documents for the first 24 hours. EYE CARE - Use your eye drops as instructed. Wash your hands before using your eye drops. Do not bump, rub, or touch the operative eye. No water in or around your eyes for 24 hours. You may shower from the shoulders down;  You may wash your hair 24 hours following surgery. SHIELD - Wear the eye shield for 2 nights following surgery. VISION - You may experience off/on blurry vision today. Your vision will steadily improve over the next days. Call your surgeon if you experience a drastic decrease in your vision. If your eyelid is closed from the anesthesia, or your eye is patched, your depth perception will be affected. Be careful when walking, especially with steps. When your eyelid opens, you may experience double vision until the anesthetic fully wears off. Bring your eye drops with you to each appointment! Further instructions will be reviewed with you at your follow up appointment. Any questions, please call the office at [phone] or call Dr. Chela Meade directly at [phone] after hours.

## 2022-08-08 NOTE — H&P
Date of Surgery Update:  Moshe Dickinson was seen, history and physical examination reviewed, and patient examined by me today. There have been no significant clinical changes since the completion of the previous history and physical. The surgical site was confirmed by the patient and me. I have presented reasonable alternatives to the patient's proposed care, treatment, and services. The discussion I have done encompassed risks, benefits, and side effects related to the alternatives and the risks related to not receiving the proposed care, treatment, and services. All questions answered. Patient wishes to proceed.      Electronically signed by: Erin Workman MD,8/8/2022,9:33 AM

## 2022-08-08 NOTE — ANESTHESIA PRE PROCEDURE
[facility] Department of Anesthesiology  Pre-Anesthesia Evaluation/Consultation       Name:  Casimiro Dakin  : 1945  Age:  76 y.o. MRN:  [id number]  Date: 2022           Surgeon: Surgeon(s):  Khushi Leon MD    Procedure: Procedure(s):  PHACOEMULSIFICATION WITH INTRAOCULAR LENS IMPLANT - LEFT EYE     No Known Allergies  Patient Active Problem List   Diagnosis    Meningioma ([address]    Nonintractable generalized idiopathic epilepsy without status epilepticus ([address]    Encephalopathy acute    Diabetes mellitus ([address]     Past Medical History:   Diagnosis Date    Atrial fibrillation ([address]     Brain tumor (benign) ([address]     MRI every 6 months has swelling which causes the seizure    CAD (coronary artery disease)     COPD (chronic obstructive pulmonary disease) ([address]     Patient denies    Diabetes mellitus ([address]     Hyperlipidemia     Hypertension     Meningioma ([address]     RBBB (right bundle branch block)     Seizure ([address]      and 2022     Past Surgical History:   Procedure Laterality Date    CARDIOVERSION      HYSTERECTOMY (CERVIX STATUS UNKNOWN)      TONSILLECTOMY       Social History     Tobacco Use    Smoking status: Never    Smokeless tobacco: Never   Vaping Use    Vaping Use: Never used   Substance Use Topics    Alcohol use: Never    Drug use: Never     Medications  No current facility-administered medications on file prior to encounter.      Current Outpatient Medications on File Prior to Encounter   Medication Sig Dispense Refill    levETIRAcetam (KEPPRA) 500 MG tablet Take 1 tablet by mouth 2 times daily 60 tablet 1    atenolol (TENORMIN) 50 MG tablet Take 50 mg by mouth at bedtime      atorvastatin (LIPITOR) 80 MG tablet Take 80 mg by mouth daily (Patient not taking: No sig reported)      flecainide (TAMBOCOR) 50 MG tablet Take 50 mg by mouth 2 times daily      losartan-hydroCHLOROthiazide (HYZAAR) 100-25 MG per tablet Take 1 tablet by mouth daily      rivaroxaban (XARELTO) 20 MG TABS tablet Take 20 mg by mouth Daily with supper      amLODIPine (NORVASC) 10 MG tablet Take 10 mg by mouth daily      metFORMIN (GLUCOPHAGE-XR) 500 MG extended release tablet Take 1,500 mg by mouth daily (with breakfast)      Multiple Vitamins-Minerals (THERAPEUTIC MULTIVITAMIN-MINERALS) tablet Take 1 tablet by mouth daily      Misc Natural Products (GLUCOSAMINE CHOND MSM FORMULA) TABS Take 1 tablet by mouth daily      vitamin D (CHOLECALCIFEROL) 25 MCG (1000 UT) TABS tablet Take 1,000 Units by mouth daily      ferrous sulfate (FE TABS 325) 325 (65 Fe) MG EC tablet Take 325 mg by mouth 2 times daily (Patient not taking: No sig reported)       Current Facility-Administered Medications   Medication Dose Route Frequency Provider Last Rate Last Admin    0.9 % sodium chloride infusion   IntraVENous Continuous Matthew Renee MD        sodium chloride flush 0.9 % injection 5-40 mL  5-40 mL IntraVENous 2 times per day Matthew Renee MD        sodium chloride flush 0.9 % injection 5-40 mL  5-40 mL IntraVENous PRN Matthew Renee MD        0.9 % sodium chloride infusion   IntraVENous PRN Matthew Renee MD        tetracaine (TETRAVISC) 0.5 % ophthalmic solution 1 drop  1 drop Left Eye See Admin Instructions Jose Luis Gibbs MD        ciprofloxacin (CILOXAN) 0.3 % ophthalmic solution 1 drop  1 drop Left Eye See [address], MD        cyclopentolate (CYCLOGYL) 1 % ophthalmic solution 1 drop  1 drop Left Eye See [address], MD        ketorolac (ACULAR) 0.5 % ophthalmic solution 1 drop  1 drop Left Eye See [address], MD        lidocaine (AKTEN) 3.5 % ophthalmic gel   Left Eye See [address], MD        phenylephrine (MYDFRIN) 2.5 % ophthalmic solution 1 drop  1 drop Left Eye See [address] John Gibbs MD       [facility] LABGLOM >60 06/14/2022 06:39 AM    GLUCOSE 171 06/14/2022 06:39 AM    PROT 8.2 06/13/2022 05:00 PM    PROT 6.5 07/13/2011 05:55 AM    CALCIUM 9.4 06/14/2022 06:39 AM    BILITOT 0.4 06/13/2022 05:00 PM    ALKPHOS 95 06/13/2022 05:00 PM    AST 27 06/13/2022 05:00 PM    ALT 26 06/13/2022 05:00 PM     BMP    Lab Results   Component Value Date/Time     06/14/2022 06:39 AM    K 3.5 06/14/2022 06:39 AM    K 3.8 06/13/2022 05:00 PM     06/14/2022 06:39 AM    CO2 22 06/14/2022 06:39 AM    BUN 23 06/14/2022 06:39 AM    CREATININE 0.9 06/14/2022 06:39 AM    CALCIUM 9.4 06/14/2022 06:39 AM    GFRAA >60 06/14/2022 06:39 AM    GFRAA >60 07/13/2011 05:55 AM    LABGLOM >60 06/14/2022 06:39 AM    GLUCOSE 171 06/14/2022 06:39 AM     POCGlucose  No results for input(s): GLUCOSE in the last 72 hours. Coags  No results found for: PROTIME, INR, APTT  HCG (If Applicable) No results found for: PREGTESTUR, PREGSERUM, HCG, HCGQUANT   ABGs No results found for: PHART, PO2ART, DVS9VCG, KJU8KVR, BEART, L7ZGFPBS   Type & Screen (If Applicable)  No results found for: LABABO, LABRH                         BMI: Wt Readings from Last 3 Encounters:       NPO Status:  >8h                          Anesthesia Evaluation  Patient summary reviewed no history of anesthetic complications:   Airway: Mallampati: II  TM distance: >3 FB   Neck ROM: full  Mouth opening: > = 3 FB   Dental: normal exam         Pulmonary: breath sounds clear to auscultation  (+) COPD:      (-) asthma                           Cardiovascular:    (+) hypertension:, CAD:,     (-) no hyperlipidemia        Rate: normal                    Neuro/Psych:      (-) seizures, TIA and CVA           GI/Hepatic/Renal:        (-) GERD, liver disease and no renal disease       Endo/Other:    (+) DiabetesType II DM, , .    (-) hypothyroidism               Abdominal:             Vascular:     - DVT and PE.       Other Findings:           Anesthesia Plan      MAC     ASA 3 Induction: intravenous. Anesthetic plan and risks discussed with patient. Plan discussed with CRNA. This pre-anesthesia assessment may be used as a history and physical.    DOS STAFF ADDENDUM:    Pt seen and examined, chart reviewed (including anesthesia, drug and allergy history). No interval changes to history and physical examination. Anesthetic plan, risks, benefits, alternatives, and personnel involved discussed with patient. Patient verbalized an understanding and agrees to proceed.       Erika Frankel MD  August 8, 2022  8:24 AM

## 2022-08-16 NOTE — PROGRESS NOTES
[address] time____0800________        Surgery time____0930________    Take the following medications with a sip of water: Follow your MD/Surgeons pre-procedure instructions regarding your medications     Do not eat or drink anything after 12:00 midnight prior to your surgery. This includes water chewing gum, mints and ice chips. You may brush your teeth and gargle the morning of your surgery, but do not swallow the water     Please see your family doctor/pediatrician for a history and physical and/or concerning medications. H&P 7/27/22 Gemini Elliott CNP   Bring any test results/reports from your physicians office. If you are under the care of a heart doctor or specialist doctor, please be aware that you may be asked to them for clearance    You may be asked to stop blood thinners such as Coumadin, Plavix, Fragmin, Lovenox, etc., or any anti-inflammatories such as:  Aspirin, Ibuprofen, Advil, Naproxen prior to your surgery. We also ask that you stop any OTC medications such as fish oil, vitamin E, glucosamine, garlic, Multivitamins, COQ 10, etc.    We ask that you do not smoke 24 hours prior to surgery  We ask that you do not  drink any alcoholic beverages 24 hours prior to surgery     You must make arrangements for a responsible adult to take you home after your surgery. For your safety you will not be allowed to leave alone or drive yourself home. Your surgery will be cancelled if you do not have a ride home. Also for your safety, it is strongly suggested that someone stay with you the first 24 hours after your surgery. A parent or legal guardian must accompany a child scheduled for surgery and plan to stay at the hospital until the child is discharged. Please do not bring other children with you. For your comfort, please wear simple loose fitting clothing to the hospital.  Please do not bring valuables.  Wear short sleeve button down shirt or loose shirt and bring eye drops or eye ointment. Do not wear any make-up or nail polish on your fingers or toes      For your safety, please do not wear any jewelry or body piercing's on the day of surgery. All jewelry must be removed. If you have dentures, they will be removed before going to operating room. For your convenience, we will provide you with a container. If you wear contact lenses or glasses, they will be removed, please bring a case for them. If you have a living will and a durable power of  for healthcare, please bring in a copy. As part of our patient safety program to minimize surgical site infections, we ask you to do the following:    Please notify your surgeon if you develop any illness between         now and the  day of your surgery. This includes a cough, cold, fever, sore throat, nausea,         or vomiting, and diarrhea, etc.   Please notify your surgeon if you experience dizziness, shortness         of breath or blurred vision between now and the time of your surgery. Do not shave your operative site 96 hours prior to surgery. For face and neck surgery, men may use an electric razor 48 hours   prior to surgery. You may shower the night before surgery or the morning of   your surgery with an antibacterial soap. You will need to bring a photo ID and insurance card    [facility] has an onsite pharmacy, would you like to utilize our pharmacy     If you will be staying overnight and use a C-pap machine, please bring   your C-pap to hospital     Our goal is to provide you with excellent care, therefore, visitors will be limited to two(2) in the room at a time so that we may focus on providing this care for you. Please contact pre-admission testing if you have any further questions.                  [facility] phone number:  [phone]    Please note these are generalized instructions for all surgical cases, you may be provided with more specific instructions according to your surgery. C-Difficile admission screening and protocol:       * Admitted with diarrhea? [] YES    [x]  NO     *Prior history of C-Diff. In last 3 months? [] YES    [x]  NO     *Antibiotic use in the past 6-8 weeks? []  NO    [x]  YES                 If yes, which ANTIBIOTIC AND REASON___eye drops ___     *Prior hospitalization or nursing home in the last month? []  YES    [x]  NO        SAFETY FIRST. .call before you fall

## 2022-08-18 ENCOUNTER — ANESTHESIA EVENT (OUTPATIENT)
Dept: SURGERY | Age: 77
End: 2022-08-18
Payer: MEDICARE

## 2022-08-19 NOTE — PROGRESS NOTES
[address]        Pre-Op Phone Call:     Patient Name: Meryle Alexander     Telephone Information:   Mobile [phone]     Home phone:  [phone]    Surgery Time:   8:50am     Arrival Time:  7:20am     Left extended Message:  No     Message left with:     Recent change in health status:  No     Advised of transportation/ policy:  Yes     NPO policy reviewed: Yes     Advised to take morning heart/blood pressure medications with sips of water morning of surgery? Yes     Instructed to bring eye drops, photo identification, and insurance card day of surgery? Yes     Advised to wear short sleeved button down shirt (no T-shirt underneath):  Yes     Advised not to wear jewelry, hairpins, or pantyhose day of surgery? Yes     Advised not to wear make-up and to wash face day of surgery? Yes    Remarks:  Spoke to pt & reviewed instructions & time change. Pt verbalized understanding.         Electronically signed by:  Demetris Mcnulty RN at 8/19/2022 12:17 PM

## 2022-08-22 ENCOUNTER — HOSPITAL ENCOUNTER (OUTPATIENT)
Age: 77
Setting detail: OUTPATIENT SURGERY
Discharge: HOME | End: 2022-08-22
Attending: OPHTHALMOLOGY | Admitting: OPHTHALMOLOGY
Payer: MEDICARE

## 2022-08-22 ENCOUNTER — ANESTHESIA (OUTPATIENT)
Dept: SURGERY | Age: 77
End: 2022-08-22
Payer: MEDICARE

## 2022-08-22 VITALS
BODY MASS INDEX: 33.77 KG/M2 | TEMPERATURE: 97.4 F | OXYGEN SATURATION: 95 % | HEIGHT: 60 IN | HEART RATE: 64 BPM | WEIGHT: 172 LBS | RESPIRATION RATE: 17 BRPM | SYSTOLIC BLOOD PRESSURE: 134 MMHG | DIASTOLIC BLOOD PRESSURE: 56 MMHG

## 2022-08-22 LAB
GLUCOSE BLD-MCNC: 114 MG/DL (ref 70–99)
GLUCOSE BLD-MCNC: 116 MG/DL (ref 70–99)
PERFORMED ON: ABNORMAL
PERFORMED ON: ABNORMAL

## 2022-08-22 PROCEDURE — 2709999900 HC NON-CHARGEABLE SUPPLY: Performed by: OPHTHALMOLOGY

## 2022-08-22 PROCEDURE — 2500000003 HC RX 250 WO HCPCS: Performed by: OPHTHALMOLOGY

## 2022-08-22 PROCEDURE — 6360000002 HC RX W HCPCS

## 2022-08-22 PROCEDURE — 6370000000 HC RX 637 (ALT 250 FOR IP): Performed by: OPHTHALMOLOGY

## 2022-08-22 PROCEDURE — 3600000004 HC SURGERY LEVEL 4 BASE: Performed by: OPHTHALMOLOGY

## 2022-08-22 PROCEDURE — 2580000003 HC RX 258

## 2022-08-22 PROCEDURE — 7100000010 HC PHASE II RECOVERY - FIRST 15 MIN: Performed by: OPHTHALMOLOGY

## 2022-08-22 PROCEDURE — 2580000003 HC RX 258: Performed by: ANESTHESIOLOGY

## 2022-08-22 PROCEDURE — V2632 POST CHMBR INTRAOCULAR LENS: HCPCS | Performed by: OPHTHALMOLOGY

## 2022-08-22 PROCEDURE — 3700000000 HC ANESTHESIA ATTENDED CARE: Performed by: OPHTHALMOLOGY

## 2022-08-22 DEVICE — IMPLANTABLE DEVICE: Type: IMPLANTABLE DEVICE | Status: FUNCTIONAL

## 2022-08-22 RX ORDER — SODIUM CHLORIDE 0.9 % (FLUSH) 0.9 %
5-40 SYRINGE (ML) INJECTION EVERY 12 HOURS SCHEDULED
Status: CANCELLED | OUTPATIENT
Start: 2022-08-22

## 2022-08-22 RX ORDER — TROPICAMIDE 10 MG/ML
1 SOLUTION/ DROPS OPHTHALMIC SEE ADMIN INSTRUCTIONS
Status: DISCONTINUED | OUTPATIENT
Start: 2022-08-22 | End: 2022-08-22 | Stop reason: HOSPADM

## 2022-08-22 RX ORDER — SODIUM CHLORIDE 9 MG/ML
INJECTION, SOLUTION INTRAVENOUS PRN
Status: DISCONTINUED | OUTPATIENT
Start: 2022-08-22 | End: 2022-08-22 | Stop reason: HOSPADM

## 2022-08-22 RX ORDER — SODIUM CHLORIDE 0.9 % (FLUSH) 0.9 %
5-40 SYRINGE (ML) INJECTION EVERY 12 HOURS SCHEDULED
Status: DISCONTINUED | OUTPATIENT
Start: 2022-08-22 | End: 2022-08-22 | Stop reason: SDUPTHER

## 2022-08-22 RX ORDER — KETOROLAC TROMETHAMINE 5 MG/ML
1 SOLUTION OPHTHALMIC SEE ADMIN INSTRUCTIONS
Status: DISCONTINUED | OUTPATIENT
Start: 2022-08-22 | End: 2022-08-22 | Stop reason: HOSPADM

## 2022-08-22 RX ORDER — SODIUM CHLORIDE 9 MG/ML
INJECTION, SOLUTION INTRAVENOUS PRN
Status: DISCONTINUED | OUTPATIENT
Start: 2022-08-22 | End: 2022-08-22 | Stop reason: SDUPTHER

## 2022-08-22 RX ORDER — SODIUM CHLORIDE 9 MG/ML
INJECTION, SOLUTION INTRAVENOUS PRN
Status: CANCELLED | OUTPATIENT
Start: 2022-08-22

## 2022-08-22 RX ORDER — SODIUM CHLORIDE 0.9 % (FLUSH) 0.9 %
5-40 SYRINGE (ML) INJECTION PRN
Status: DISCONTINUED | OUTPATIENT
Start: 2022-08-22 | End: 2022-08-22 | Stop reason: HOSPADM

## 2022-08-22 RX ORDER — SODIUM CHLORIDE 9 MG/ML
INJECTION, SOLUTION INTRAVENOUS CONTINUOUS PRN
Status: DISCONTINUED | OUTPATIENT
Start: 2022-08-22 | End: 2022-08-22 | Stop reason: SDUPTHER

## 2022-08-22 RX ORDER — PHENYLEPHRINE HCL 2.5 %
1 DROPS OPHTHALMIC (EYE) SEE ADMIN INSTRUCTIONS
Status: DISCONTINUED | OUTPATIENT
Start: 2022-08-22 | End: 2022-08-22 | Stop reason: HOSPADM

## 2022-08-22 RX ORDER — CYCLOPENTOLATE HYDROCHLORIDE 10 MG/ML
1 SOLUTION/ DROPS OPHTHALMIC SEE ADMIN INSTRUCTIONS
Status: DISCONTINUED | OUTPATIENT
Start: 2022-08-22 | End: 2022-08-22 | Stop reason: HOSPADM

## 2022-08-22 RX ORDER — TETRACAINE HYDROCHLORIDE 5 MG/ML
SOLUTION OPHTHALMIC
Status: COMPLETED | OUTPATIENT
Start: 2022-08-22 | End: 2022-08-22

## 2022-08-22 RX ORDER — FENTANYL CITRATE 50 UG/ML
INJECTION, SOLUTION INTRAMUSCULAR; INTRAVENOUS PRN
Status: DISCONTINUED | OUTPATIENT
Start: 2022-08-22 | End: 2022-08-22 | Stop reason: SDUPTHER

## 2022-08-22 RX ORDER — SODIUM CHLORIDE 0.9 % (FLUSH) 0.9 %
5-40 SYRINGE (ML) INJECTION EVERY 12 HOURS SCHEDULED
Status: DISCONTINUED | OUTPATIENT
Start: 2022-08-22 | End: 2022-08-22 | Stop reason: HOSPADM

## 2022-08-22 RX ORDER — MIDAZOLAM HYDROCHLORIDE 1 MG/ML
INJECTION INTRAMUSCULAR; INTRAVENOUS PRN
Status: DISCONTINUED | OUTPATIENT
Start: 2022-08-22 | End: 2022-08-22 | Stop reason: SDUPTHER

## 2022-08-22 RX ORDER — SODIUM CHLORIDE 0.9 % (FLUSH) 0.9 %
5-40 SYRINGE (ML) INJECTION PRN
Status: CANCELLED | OUTPATIENT
Start: 2022-08-22

## 2022-08-22 RX ORDER — CIPROFLOXACIN HYDROCHLORIDE 3.5 MG/ML
1 SOLUTION/ DROPS TOPICAL SEE ADMIN INSTRUCTIONS
Status: DISCONTINUED | OUTPATIENT
Start: 2022-08-22 | End: 2022-08-22 | Stop reason: HOSPADM

## 2022-08-22 RX ORDER — BRIMONIDINE TARTRATE 0.15 %
DROPS OPHTHALMIC (EYE)
Status: COMPLETED | OUTPATIENT
Start: 2022-08-22 | End: 2022-08-22

## 2022-08-22 RX ORDER — LIDOCAINE HYDROCHLORIDE 10 MG/ML
INJECTION, SOLUTION EPIDURAL; INFILTRATION; INTRACAUDAL; PERINEURAL
Status: COMPLETED | OUTPATIENT
Start: 2022-08-22 | End: 2022-08-22

## 2022-08-22 RX ORDER — SODIUM CHLORIDE 0.9 % (FLUSH) 0.9 %
5-40 SYRINGE (ML) INJECTION PRN
Status: DISCONTINUED | OUTPATIENT
Start: 2022-08-22 | End: 2022-08-22 | Stop reason: SDUPTHER

## 2022-08-22 RX ORDER — BALANCED SALT SOLUTION 6.4; .75; .48; .3; 3.9; 1.7 MG/ML; MG/ML; MG/ML; MG/ML; MG/ML; MG/ML
SOLUTION OPHTHALMIC
Status: COMPLETED | OUTPATIENT
Start: 2022-08-22 | End: 2022-08-22

## 2022-08-22 RX ORDER — ONDANSETRON 2 MG/ML
4 INJECTION INTRAMUSCULAR; INTRAVENOUS
Status: CANCELLED | OUTPATIENT
Start: 2022-08-22 | End: 2022-08-22

## 2022-08-22 RX ORDER — TETRACAINE HYDROCHLORIDE 5 MG/ML
1 SOLUTION OPHTHALMIC SEE ADMIN INSTRUCTIONS
Status: DISCONTINUED | OUTPATIENT
Start: 2022-08-22 | End: 2022-08-22 | Stop reason: HOSPADM

## 2022-08-22 RX ADMIN — SODIUM CHLORIDE: 9 INJECTION, SOLUTION INTRAVENOUS at 08:36

## 2022-08-22 RX ADMIN — CYCLOPENTOLATE HYDROCHLORIDE 1 DROP: 10 SOLUTION OPHTHALMIC at 08:15

## 2022-08-22 RX ADMIN — PHENYLEPHRINE HYDROCHLORIDE 1 DROP: 25 SOLUTION/ DROPS OPHTHALMIC at 08:15

## 2022-08-22 RX ADMIN — MIDAZOLAM 1 MG: 1 INJECTION INTRAMUSCULAR; INTRAVENOUS at 08:52

## 2022-08-22 RX ADMIN — CIPROFLOXACIN 1 DROP: 3 SOLUTION OPHTHALMIC at 08:36

## 2022-08-22 RX ADMIN — CIPROFLOXACIN 1 DROP: 3 SOLUTION OPHTHALMIC at 08:15

## 2022-08-22 RX ADMIN — SODIUM CHLORIDE: 9 INJECTION, SOLUTION INTRAVENOUS at 07:51

## 2022-08-22 RX ADMIN — TETRACAINE HYDROCHLORIDE 1 DROP: 5 SOLUTION OPHTHALMIC at 08:15

## 2022-08-22 RX ADMIN — TROPICAMIDE 1 DROP: 10 SOLUTION/ DROPS OPHTHALMIC at 08:36

## 2022-08-22 RX ADMIN — PHENYLEPHRINE HYDROCHLORIDE 1 DROP: 25 SOLUTION/ DROPS OPHTHALMIC at 08:36

## 2022-08-22 RX ADMIN — LIDOCAINE HYDROCHLORIDE: 35 GEL OPHTHALMIC at 08:15

## 2022-08-22 RX ADMIN — KETOROLAC TROMETHAMINE 1 DROP: 0.5 SOLUTION OPHTHALMIC at 08:36

## 2022-08-22 RX ADMIN — MIDAZOLAM 0.5 MG: 1 INJECTION INTRAMUSCULAR; INTRAVENOUS at 08:54

## 2022-08-22 RX ADMIN — CYCLOPENTOLATE HYDROCHLORIDE 1 DROP: 10 SOLUTION OPHTHALMIC at 08:36

## 2022-08-22 RX ADMIN — TROPICAMIDE 1 DROP: 10 SOLUTION/ DROPS OPHTHALMIC at 08:15

## 2022-08-22 RX ADMIN — KETOROLAC TROMETHAMINE 1 DROP: 0.5 SOLUTION OPHTHALMIC at 08:15

## 2022-08-22 RX ADMIN — FENTANYL CITRATE 50 MCG: 50 INJECTION INTRAMUSCULAR; INTRAVENOUS at 08:52

## 2022-08-22 ASSESSMENT — PAIN - FUNCTIONAL ASSESSMENT: PAIN_FUNCTIONAL_ASSESSMENT: 0-10

## 2022-08-22 ASSESSMENT — PAIN SCALES - GENERAL
PAINLEVEL_OUTOF10: 0
PAINLEVEL_OUTOF10: 0

## 2022-08-22 NOTE — ANESTHESIA PRE PROCEDURE
[facility] Department of Anesthesiology  Pre-Anesthesia Evaluation/Consultation       Name:  Jaydon Heath  : 1945  Age:  76 y.o. MRN:  [id number]  Date: 2022           Surgeon: Surgeon(s):  Kavon Min MD    Procedure: Procedure(s):  PHACOEMULSIFICATION WITH INTRAOCULAR LENS IMPLANT - RIGHT EYE     No Known Allergies  Patient Active Problem List   Diagnosis    Meningioma ([address]    Nonintractable generalized idiopathic epilepsy without status epilepticus ([address]    Encephalopathy acute    Diabetes mellitus ([address]     Past Medical History:   Diagnosis Date    Atrial fibrillation ([address]     Brain tumor (benign) ([address]     MRI every 6 months has swelling which causes the seizure    CAD (coronary artery disease)     COPD (chronic obstructive pulmonary disease) ([address]     Patient denies    Diabetes mellitus ([address]     Hyperlipidemia     Hypertension     Meningioma ([address]     RBBB (right bundle branch block)     Seizure ([address]      and 2022     Past Surgical History:   Procedure Laterality Date    CARDIOVERSION      HYSTERECTOMY (CERVIX STATUS UNKNOWN)      INTRACAPSULAR CATARACT EXTRACTION Left 2022    PHACOEMULSIFICATION WITH INTRAOCULAR LENS IMPLANT - LEFT EYE performed by Kavon Min MD at [address] [place] [address]       Social History     Tobacco Use    Smoking status: Never    Smokeless tobacco: Never   Vaping Use    Vaping Use: Never used   Substance Use Topics    Alcohol use: Never    Drug use: Never     Medications  No current facility-administered medications on file prior to encounter.      Current Outpatient Medications on File Prior to Encounter   Medication Sig Dispense Refill    levETIRAcetam (KEPPRA) 500 MG tablet Take 1 tablet by mouth 2 times daily 60 tablet 1    atenolol (TENORMIN) 50 MG tablet Take 50 mg by mouth at bedtime      atorvastatin (LIPITOR) 80 MG tablet Take 80 mg by mouth daily      flecainide (TAMBOCOR) 50 MG tablet Take 50 mg by mouth 2 times daily      losartan-hydroCHLOROthiazide (HYZAAR) 100-25 MG per tablet Take 1 tablet by mouth daily      rivaroxaban (XARELTO) 20 MG TABS tablet Take 20 mg by mouth Daily with supper      amLODIPine (NORVASC) 10 MG tablet Take 10 mg by mouth daily      metFORMIN (GLUCOPHAGE-XR) 500 MG extended release tablet Take 1,500 mg by mouth daily (with breakfast)      Multiple Vitamins-Minerals (THERAPEUTIC MULTIVITAMIN-MINERALS) tablet Take 1 tablet by mouth daily      Misc Natural Products (GLUCOSAMINE CHOND MSM FORMULA) TABS Take 1 tablet by mouth daily      vitamin D (CHOLECALCIFEROL) 25 MCG (1000 UT) TABS tablet Take 1,000 Units by mouth daily      ferrous sulfate (FE TABS 325) 325 (65 Fe) MG EC tablet Take 325 mg by mouth 2 times daily       Current Facility-Administered Medications   Medication Dose Route Frequency Provider Last Rate Last Admin    sodium chloride flush 0.9 % injection 5-40 mL  5-40 mL IntraVENous 2 times per day Ritika Carmichael MD        sodium chloride flush 0.9 % injection 5-40 mL  5-40 mL IntraVENous PRN Ritika Carmichael MD        0.9 % sodium chloride infusion   IntraVENous PRN Ritika Carmichael  mL/hr at 08/22/22 0836 New Bag at 08/22/22 0836    tetracaine (TETRAVISC) 0.5 % ophthalmic solution 1 drop  1 drop Right Eye See Admin Instructions Jose Luis Valencia MD   1 drop at 08/22/22 0815    tropicamide (MYDRIACYL) 1 % ophthalmic solution 1 drop  1 drop Right Eye See Admin Instructions Jose Luis Valencia MD   1 drop at 08/22/22 0836    ketorolac (ACULAR) 0.5 % ophthalmic solution 1 drop  1 drop Right Eye See Admin Instructions Jose Luis Valencia MD   1 drop at 08/22/22 0836    phenylephrine (MYDFRIN) 2.5 % ophthalmic solution 1 drop  1 drop Right Eye See Admin Instructions Jose Luis Valencia MD   1 drop at 08/22/22 0836    ciprofloxacin (CILOXAN) 0.3 % ophthalmic solution 1 drop  1 drop Right Eye See Admin Instructions Yoly Barker MD   1 drop at 22 0836    lidocaine (AKTEN) 3.5 % ophthalmic gel   Right Eye See [address], MD   Given at 22 0815    cyclopentolate (CYCLOGYL) 1 % ophthalmic solution 1 drop  1 drop Right Eye See Admin Instructions Jose Luis Batista MD   1 drop at 22 7167     Facility-Administered Medications Ordered in Other Encounters   Medication Dose Route Frequency Provider Last Rate Last Admin    0.9 % sodium chloride infusion   IntraVENous Continuous PRN Alyssa Maynard RN   New Bag at 22 0751     Vital Signs (Current)   Vitals:    22 08   BP: (!) 152/48   Pulse: 68   Resp: 16   Temp: 97.5 °F (36.4 °C)   SpO2: 98%     Vital Signs Statistics (for past 48 hrs)     Temp  Av.5 °F (36.4 °C)  Min: 97.5 °F (36.4 °C)   Min taken time: 22  Max: 97.5 °F (36.4 °C)   Max taken time: 22  Pulse  Av  Min: 76   Min taken time: 22 08  Max: 76   Max taken time: 22  Resp  Av  Min: 16   Min taken time: 22  Max: 16   Max taken time: 22  BP  Min: 152/48   Min taken time: 22  Max: 152/48   Max taken time: 22  SpO2  Av %  Min: 98 %   Min taken time: 22  Max: 98 %   Max taken time: 22    BP Readings from Last 3 Encounters:   22 (!) 152/48   22 (!) 136/58   06/15/22 (!) 152/66     BMI  Body mass index is 33.59 kg/m². Estimated body mass index is 33.59 kg/m² as calculated from the following:    Height as of this encounter: 5' (1.524 m). Weight as of this encounter: 172 lb (78 kg).     CBC   Lab Results   Component Value Date/Time    WBC 14.5 2022 06:39 AM    RBC 4.38 2022 06:39 AM    HGB 12.5 2022 06:39 AM    HCT 37.3 2022 06:39 AM    MCV 85.1 2022 06:39 AM    RDW 16.2 2022 06:39 AM     2022 06:39 AM     CMP    Lab Results   Component Value Date/Time     2022 06:39 AM K 3.5 06/14/2022 06:39 AM    K 3.8 06/13/2022 05:00 PM     06/14/2022 06:39 AM    CO2 22 06/14/2022 06:39 AM    BUN 23 06/14/2022 06:39 AM    CREATININE 0.9 06/14/2022 06:39 AM    GFRAA >60 06/14/2022 06:39 AM    GFRAA >60 07/13/2011 05:55 AM    AGRATIO 1.2 06/13/2022 05:00 PM    LABGLOM >60 06/14/2022 06:39 AM    GLUCOSE 171 06/14/2022 06:39 AM    PROT 8.2 06/13/2022 05:00 PM    PROT 6.5 07/13/2011 05:55 AM    CALCIUM 9.4 06/14/2022 06:39 AM    BILITOT 0.4 06/13/2022 05:00 PM    ALKPHOS 95 06/13/2022 05:00 PM    AST 27 06/13/2022 05:00 PM    ALT 26 06/13/2022 05:00 PM     BMP    Lab Results   Component Value Date/Time     06/14/2022 06:39 AM    K 3.5 06/14/2022 06:39 AM    K 3.8 06/13/2022 05:00 PM     06/14/2022 06:39 AM    CO2 22 06/14/2022 06:39 AM    BUN 23 06/14/2022 06:39 AM    CREATININE 0.9 06/14/2022 06:39 AM    CALCIUM 9.4 06/14/2022 06:39 AM    GFRAA >60 06/14/2022 06:39 AM    GFRAA >60 07/13/2011 05:55 AM    LABGLOM >60 06/14/2022 06:39 AM    GLUCOSE 171 06/14/2022 06:39 AM     POCGlucose  No results for input(s): GLUCOSE in the last 72 hours.    Coags  No results found for: PROTIME, INR, APTT  HCG (If Applicable) No results found for: PREGTESTUR, PREGSERUM, HCG, HCGQUANT   ABGs No results found for: PHART, PO2ART, QGU3MPC, JFA9BQC, BEART, V8ZJWGTF   Type & Screen (If Applicable)  No results found for: LABABO, LABRH                         BMI: Wt Readings from Last 3 Encounters:       NPO Status:   Date of last liquid consumption: 08/21/22   Time of last liquid consumption: 1900   Date of last solid food consumption: 08/21/22      Time of last solid consumption: 1900       Anesthesia Evaluation  Patient summary reviewed no history of anesthetic complications:   Airway: Mallampati: III  TM distance: >3 FB   Neck ROM: full  Mouth opening: > = 3 FB   Dental:    (+) partials      Pulmonary:normal exam    (+) COPD:                             Cardiovascular:  Exercise tolerance: good (>4 METS),   (+) hypertension (ef75):, CAD:, dysrhythmias (LAFB, RBBB): atrial fibrillation,       ECG reviewed  Rhythm: regular  Rate: normal  Echocardiogram reviewed         Beta Blocker:  Dose within 24 Hrs         Neuro/Psych:   (+) seizures (every 6 months due to meningioma; last sz last month):,             GI/Hepatic/Renal: Neg GI/Hepatic/Renal ROS       (-) GERD       Endo/Other:    (+) DiabetesType II DM, , blood dyscrasia (xarelto yesterday): anticoagulation therapy:., .                 Abdominal:   (+) obese,           Vascular: Other Findings:           Anesthesia Plan      MAC     ASA 3       Induction: intravenous. Anesthetic plan and risks discussed with patient. Plan discussed with CRNA. This pre-anesthesia assessment may be used as a history and physical.    DOS STAFF ADDENDUM:    Pt seen and examined, chart reviewed (including anesthesia, drug and allergy history). No interval changes to history and physical examination. Anesthetic plan, risks, benefits, alternatives, and personnel involved discussed with patient. Questions and concerns addressed. Patient(family) verbalized an understanding and agrees to proceed.       Ruthann Morocho MD  August 22, 2022  8:41 AM

## 2022-08-22 NOTE — ANESTHESIA POSTPROCEDURE EVALUATION
[facility] Department of Anesthesiology  Post-Anesthesia Note       Name:  Kari Mills                                  Age:  76 y.o. MRN:  [id number]     Last Vitals & Oxygen Saturation: BP (!) 134/56   Pulse 64   Temp 97.4 °F (36.3 °C) (Temporal)   Resp 17   Ht 5' (1.524 m)   Wt 172 lb (78 kg)   SpO2 95%   BMI 33.59 kg/m²   Patient Vitals for the past 4 hrs:   BP Temp Temp src Pulse Resp SpO2 Height Weight   08/22/22 0910 (!) 134/56 -- -- 64 17 95 % -- --   08/22/22 0902 (!) 129/50 97.4 °F (36.3 °C) Temporal 59 15 96 % -- --   08/22/22 0823 (!) 152/48 97.5 °F (36.4 °C) Temporal 68 16 98 % 5' (1.524 m) 172 lb (78 kg)       Level of consciousness:  Awake, alert    Respiratory: Respirations easy, no distress. Stable. Cardiovascular: Hemodynamically stable. Hydration: Adequate. PONV: Adequately managed. Post-op pain: Adequately controlled. Post-op assessment: Tolerated anesthetic well without complication. Complications:  None.     Kirsten Coles MD  August 22, 2022   10:05 AM

## 2022-08-22 NOTE — DISCHARGE INSTRUCTIONS
[facility] [address]   [phone]       Post-Operative Instructions for Day of Cataract Surgery    2022      Patient Name: Rodolfo Levi  : 1945  MRN: [id number]    Use the Antibiotic Drop {Blank single:11767::\"VIgamox\",\"Ofloxacin\"} one drop in the OPERATED right eye THREE more times today. Use the NSAID Drop {Blank single:01616::\"Prolensa\",\"Bromsite\"} one drop in the OPERATED right eye ONE more time today. Use the STEROID Drop {Blank single:00566::\"Pred Forte\",\"Prednisolone\"} one drop in the OPERATED right eye THREE more times today. You may watch TV, walk, and do routine chores. Avoid heavy exertion or straining. Wear shield at bedtime for TWO nights. You may take Tylenol or Advil for mild irritation or pain. If you have pain worse than what Tylenol or Advil can manage, call your physician  Your next appointment is *** @ *** at the {Blank single:::\"[place]\",\"[place]\",\"[facility] [address]\"} location. Remember to bring your eye medications/kit to the office. If your next day appointment is in the afternoon, then use the Antibiotic, Anti-inflammatory, and {Blank single:::\"Durezol\",\"Prednisolone\"} drops once each that morning. General Post-Operative Instructions for Cataract Surgery    COMFORT - Your eye may feel irritated (scratchy, stinging, or achy) this is normal.   DIET - You may resume your regular diet, no alcohol for 24 hours. ACTIVITY - Do not lift anything over 25 pounds today, nothing over 50 pounds for the first week. No driving for 24 hours, Do not make any important decisions or sign legal documents for the first 24 hours. EYE CARE - Use your eye drops as instructed. Wash your hands before using your eye drops. Do not bump, rub, or touch the operative eye. No water in or around your eyes for 24 hours. You may shower from the shoulders down;  You may wash your hair 24 hours following surgery. SHIELD - Wear the eye shield for 2 nights following surgery. VISION - You may experience off/on blurry vision today. Your vision will steadily improve over the next days. Call your surgeon if you experience a drastic decrease in your vision. If your eyelid is closed from the anesthesia, or your eye is patched, your depth perception will be affected. Be careful when walking, especially with steps. When your eyelid opens, you may experience double vision until the anesthetic fully wears off. Bring your eye drops with you to each appointment! Further instructions will be reviewed with you at your follow up appointment. Any questions, please call the office at [phone] or call Dr. Linnette Rivera directly at [phone] after hours.

## 2022-08-22 NOTE — H&P
Date of Surgery Update:  Trip Dumas was seen, history and physical examination reviewed, and patient examined by me today. There have been no significant clinical changes since the completion of the previous history and physical. The surgical site was confirmed by the patient and me. I have presented reasonable alternatives to the patient's proposed care, treatment, and services. The discussion I have done encompassed risks, benefits, and side effects related to the alternatives and the risks related to not receiving the proposed care, treatment, and services. All questions answered. Patient wishes to proceed.      Electronically signed by: Wilfredo Chapman MD,8/22/2022,9:15 AM

## 2022-11-23 ENCOUNTER — APPOINTMENT (OUTPATIENT)
Dept: GENERAL RADIOLOGY | Age: 77
End: 2022-11-23
Payer: MEDICARE

## 2022-11-23 ENCOUNTER — APPOINTMENT (OUTPATIENT)
Dept: CT IMAGING | Age: 77
End: 2022-11-23
Payer: MEDICARE

## 2022-11-23 ENCOUNTER — HOSPITAL ENCOUNTER (EMERGENCY)
Age: 77
Discharge: HOME | End: 2022-11-24
Attending: EMERGENCY MEDICINE
Payer: MEDICARE

## 2022-11-23 VITALS
DIASTOLIC BLOOD PRESSURE: 57 MMHG | WEIGHT: 183.2 LBS | BODY MASS INDEX: 35.78 KG/M2 | TEMPERATURE: 98.8 F | OXYGEN SATURATION: 100 % | SYSTOLIC BLOOD PRESSURE: 147 MMHG | RESPIRATION RATE: 19 BRPM | HEART RATE: 82 BPM

## 2022-11-23 DIAGNOSIS — N30.00 ACUTE CYSTITIS WITHOUT HEMATURIA: Primary | ICD-10-CM

## 2022-11-23 DIAGNOSIS — N17.9 AKI (ACUTE KIDNEY INJURY) (HCC): ICD-10-CM

## 2022-11-23 LAB
A/G RATIO: 1.2 (ref 1.1–2.2)
ALBUMIN SERPL-MCNC: 4.2 G/DL (ref 3.4–5)
ALP BLD-CCNC: 119 U/L (ref 40–129)
ALT SERPL-CCNC: 23 U/L (ref 10–40)
ANION GAP SERPL CALCULATED.3IONS-SCNC: 14 MMOL/L (ref 3–16)
AST SERPL-CCNC: 27 U/L (ref 15–37)
BACTERIA: ABNORMAL /HPF
BASOPHILS ABSOLUTE: 0.1 K/UL (ref 0–0.2)
BASOPHILS RELATIVE PERCENT: 1.2 %
BILIRUB SERPL-MCNC: <0.2 MG/DL (ref 0–1)
BILIRUBIN URINE: NEGATIVE
BLOOD, URINE: NEGATIVE
BUN BLDV-MCNC: 35 MG/DL (ref 7–20)
CALCIUM SERPL-MCNC: 10.3 MG/DL (ref 8.3–10.6)
CHLORIDE BLD-SCNC: 101 MMOL/L (ref 99–110)
CLARITY: ABNORMAL
CO2: 22 MMOL/L (ref 21–32)
COLOR: YELLOW
CREAT SERPL-MCNC: 1.6 MG/DL (ref 0.6–1.2)
EOSINOPHILS ABSOLUTE: 0.4 K/UL (ref 0–0.6)
EOSINOPHILS RELATIVE PERCENT: 3.6 %
EPITHELIAL CELLS, UA: 10 /HPF (ref 0–5)
GFR SERPL CREATININE-BSD FRML MDRD: 33 ML/MIN/{1.73_M2}
GLUCOSE BLD-MCNC: 153 MG/DL (ref 70–99)
GLUCOSE URINE: NEGATIVE MG/DL
HCT VFR BLD CALC: 32 % (ref 36–48)
HEMOGLOBIN: 10.2 G/DL (ref 12–16)
HYALINE CASTS: 10 /LPF (ref 0–8)
INR BLD: 1.29 (ref 0.87–1.14)
KETONES, URINE: ABNORMAL MG/DL
LEUKOCYTE ESTERASE, URINE: ABNORMAL
LYMPHOCYTES ABSOLUTE: 2.2 K/UL (ref 1–5.1)
LYMPHOCYTES RELATIVE PERCENT: 19.4 %
MCH RBC QN AUTO: 25.3 PG (ref 26–34)
MCHC RBC AUTO-ENTMCNC: 31.8 G/DL (ref 31–36)
MCV RBC AUTO: 79.6 FL (ref 80–100)
MICROSCOPIC EXAMINATION: YES
MONOCYTES ABSOLUTE: 1.1 K/UL (ref 0–1.3)
MONOCYTES RELATIVE PERCENT: 9.9 %
NEUTROPHILS ABSOLUTE: 7.6 K/UL (ref 1.7–7.7)
NEUTROPHILS RELATIVE PERCENT: 65.9 %
NITRITE, URINE: NEGATIVE
PDW BLD-RTO: 15 % (ref 12.4–15.4)
PH UA: 5.5 (ref 5–8)
PLATELET # BLD: 392 K/UL (ref 135–450)
PMV BLD AUTO: 8.5 FL (ref 5–10.5)
POTASSIUM REFLEX MAGNESIUM: 4 MMOL/L (ref 3.5–5.1)
PROTEIN UA: ABNORMAL MG/DL
PROTHROMBIN TIME: 16 SEC (ref 11.7–14.5)
RBC # BLD: 4.02 M/UL (ref 4–5.2)
RBC UA: 2 /HPF (ref 0–4)
REASON FOR REJECTION: NORMAL
REJECTED TEST: NORMAL
SODIUM BLD-SCNC: 137 MMOL/L (ref 136–145)
SPECIFIC GRAVITY UA: 1.02 (ref 1–1.03)
TOTAL PROTEIN: 7.7 G/DL (ref 6.4–8.2)
TROPONIN: <0.01 NG/ML
URINE TYPE: ABNORMAL
UROBILINOGEN, URINE: 0.2 E.U./DL
WBC # BLD: 11.5 K/UL (ref 4–11)
WBC UA: 54 /HPF (ref 0–5)

## 2022-11-23 PROCEDURE — 85025 COMPLETE CBC W/AUTO DIFF WBC: CPT

## 2022-11-23 PROCEDURE — 99285 EMERGENCY DEPT VISIT HI MDM: CPT

## 2022-11-23 PROCEDURE — 70450 CT HEAD/BRAIN W/O DYE: CPT

## 2022-11-23 PROCEDURE — 84484 ASSAY OF TROPONIN QUANT: CPT

## 2022-11-23 PROCEDURE — 71046 X-RAY EXAM CHEST 2 VIEWS: CPT

## 2022-11-23 PROCEDURE — 2580000003 HC RX 258: Performed by: PHYSICIAN ASSISTANT

## 2022-11-23 PROCEDURE — 81001 URINALYSIS AUTO W/SCOPE: CPT

## 2022-11-23 PROCEDURE — 85610 PROTHROMBIN TIME: CPT

## 2022-11-23 PROCEDURE — 6360000002 HC RX W HCPCS: Performed by: PHYSICIAN ASSISTANT

## 2022-11-23 PROCEDURE — 93005 ELECTROCARDIOGRAM TRACING: CPT | Performed by: PHYSICIAN ASSISTANT

## 2022-11-23 PROCEDURE — 36415 COLL VENOUS BLD VENIPUNCTURE: CPT

## 2022-11-23 PROCEDURE — 96365 THER/PROPH/DIAG IV INF INIT: CPT

## 2022-11-23 PROCEDURE — 80053 COMPREHEN METABOLIC PANEL: CPT

## 2022-11-23 RX ORDER — 0.9 % SODIUM CHLORIDE 0.9 %
1000 INTRAVENOUS SOLUTION INTRAVENOUS ONCE
Status: COMPLETED | OUTPATIENT
Start: 2022-11-23 | End: 2022-11-24

## 2022-11-23 RX ORDER — CEFUROXIME AXETIL 250 MG/1
250 TABLET ORAL 2 TIMES DAILY
Qty: 14 TABLET | Refills: 0 | Status: SHIPPED | OUTPATIENT
Start: 2022-11-23 | End: 2022-11-30

## 2022-11-23 RX ADMIN — SODIUM CHLORIDE 1000 ML: 9 INJECTION, SOLUTION INTRAVENOUS at 22:55

## 2022-11-23 RX ADMIN — CEFTRIAXONE 1000 MG: 1 INJECTION, POWDER, FOR SOLUTION INTRAMUSCULAR; INTRAVENOUS at 22:56

## 2022-11-23 ASSESSMENT — LIFESTYLE VARIABLES
HOW OFTEN DO YOU HAVE A DRINK CONTAINING ALCOHOL: NEVER
HOW MANY STANDARD DRINKS CONTAINING ALCOHOL DO YOU HAVE ON A TYPICAL DAY: PATIENT DOES NOT DRINK

## 2022-11-24 LAB
EKG ATRIAL RATE: 81 BPM
EKG DIAGNOSIS: NORMAL
EKG P AXIS: 46 DEGREES
EKG P-R INTERVAL: 156 MS
EKG Q-T INTERVAL: 402 MS
EKG QRS DURATION: 152 MS
EKG QTC CALCULATION (BAZETT): 466 MS
EKG R AXIS: -78 DEGREES
EKG T AXIS: 39 DEGREES
EKG VENTRICULAR RATE: 81 BPM

## 2022-11-24 PROCEDURE — 93010 ELECTROCARDIOGRAM REPORT: CPT | Performed by: INTERNAL MEDICINE

## 2022-11-24 NOTE — ED PROVIDER NOTES
I PERSONALLY SAW THE PATIENT AND PERFORMED A SUBSTANTIVE PORTION OF THE VISIT INCLUDING ALL ASPECTS OF THE MEDICAL DECISION MAKING PROCESS. [address]      Pt Name: Elbert Duverney  MRN: [id number]  [place] 1945  Date of evaluation: 11/23/2022  Provider: Taina Kelley MD    CHIEF COMPLAINT       Chief Complaint   Patient presents with    Fatigue    Anxiety       HISTORY OF Kadeem Santos is a 76 y.o. female who presents to the emergency department with anxiety-like state. Patient states that she became anxious. States her  recently passed away. During the holidays she gets very upset. Tonight she had an episode where she became very anxious could not talk. Her whole body became weak and she got very scared. States that resolved after few minutes. States she is back to her baseline now. No chest pain or shortness of breath. States this is happened in the past.  States she was told it was panic attack related. No other associated symptoms    Nursing Notes were reviewed. Including nursing noted for FM, Surgical History, Past Medical History, Social History, vitals, and allergies; agree with all. REVIEW OF SYSTEMS       Review of Systems    Except as noted above the remainder of the review of systems was reviewed and negative.      PAST MEDICAL HISTORY     Past Medical History:   Diagnosis Date    Atrial fibrillation [facility])     Brain tumor (benign) ([address]     MRI every 6 months has swelling which causes the seizure    CAD (coronary artery disease)     COPD (chronic obstructive pulmonary disease) ([address]     Patient denies    Diabetes mellitus ([address]     Hyperlipidemia     Hypertension     Meningioma ([address]     RBBB (right bundle branch block)     Seizure ([address]     2019 and 6/2022       SURGICAL HISTORY       Past Surgical History:   Procedure Laterality Date    CARDIOVERSION      HYSTERECTOMY (CERVIX STATUS UNKNOWN)      INTRACAPSULAR CATARACT EXTRACTION Left 8/8/2022    PHACOEMULSIFICATION WITH INTRAOCULAR LENS IMPLANT - LEFT EYE performed by Haylee Jackson MD at [address] Right 8/22/2022    PHACOEMULSIFICATION WITH INTRAOCULAR LENS IMPLANT - RIGHT EYE performed by Haylee Jackson MD at [facility]       Previous Medications    AMLODIPINE (NORVASC) 10 MG TABLET    Take 10 mg by mouth daily    ATENOLOL (TENORMIN) 50 MG TABLET    Take 50 mg by mouth at bedtime    ATORVASTATIN (LIPITOR) 80 MG TABLET    Take 80 mg by mouth daily    FERROUS SULFATE (FE TABS 325) 325 (65 FE) MG EC TABLET    Take 325 mg by mouth 2 times daily    FLECAINIDE (TAMBOCOR) 50 MG TABLET    Take 50 mg by mouth 2 times daily    LEVETIRACETAM (KEPPRA) 500 MG TABLET    Take 1 tablet by mouth 2 times daily    LOSARTAN-HYDROCHLOROTHIAZIDE (HYZAAR) 100-25 MG PER TABLET    Take 1 tablet by mouth daily    METFORMIN (GLUCOPHAGE-XR) 500 MG EXTENDED RELEASE TABLET    Take 1,500 mg by mouth daily (with breakfast)    MISC NATURAL PRODUCTS (GLUCOSAMINE CHOND MSM FORMULA) TABS    Take 1 tablet by mouth daily    MULTIPLE VITAMINS-MINERALS (THERAPEUTIC MULTIVITAMIN-MINERALS) TABLET    Take 1 tablet by mouth daily    RIVAROXABAN (XARELTO) 20 MG TABS TABLET    Take 20 mg by mouth Daily with supper    VITAMIN D (CHOLECALCIFEROL) 25 MCG (1000 UT) TABS TABLET    Take 1,000 Units by mouth daily       ALLERGIES     Patient has no known allergies. FAMILY HISTORY      History reviewed. No pertinent family history.     SOCIAL HISTORY       Social History     Socioeconomic History    Marital status:      Spouse name: None    Number of children: None    Years of education: None    Highest education level: None   Tobacco Use    Smoking status: Never    Smokeless tobacco: Never   Vaping Use    Vaping Use: Never used   Substance and Sexual Activity    Alcohol use: Never    Drug use: Never       PHYSICAL EXAM       ED Triage Vitals   BP Temp Temp Source Heart Rate Resp SpO2 Height Weight   11/23/22 2058 11/23/22 2058 11/23/22 2058 11/23/22 2058 11/23/22 2058 11/23/22 2058 -- 11/23/22 2100   (!) 147/57 98.8 °F (37.1 °C) Oral 82 19 100 %  183 lb 3.2 oz (83.1 kg)       Physical Exam  Vitals and nursing note reviewed. Constitutional:       General: She is not in acute distress. Appearance: She is well-developed. She is not ill-appearing, toxic-appearing or diaphoretic. HENT:      Head: Normocephalic and atraumatic. Right Ear: External ear normal.      Left Ear: External ear normal.   Eyes:      General:         Right eye: No discharge. Left eye: No discharge. Conjunctiva/sclera: Conjunctivae normal.      Pupils: Pupils are equal, round, and reactive to light. Cardiovascular:      Rate and Rhythm: Normal rate and regular rhythm. Heart sounds: No murmur heard. Pulmonary:      Effort: Pulmonary effort is normal. No respiratory distress. Breath sounds: Normal breath sounds. No wheezing or rales. Abdominal:      General: Bowel sounds are normal. There is no distension. Palpations: Abdomen is soft. There is no mass. Tenderness: There is no abdominal tenderness. There is no guarding or rebound. Genitourinary:     Comments: Deferred  Musculoskeletal:         General: No deformity. Normal range of motion. Cervical back: Normal range of motion and neck supple. Skin:     General: Skin is warm. Findings: No erythema or rash. Neurological:      General: No focal deficit present. Mental Status: She is alert and oriented to person, place, and time. She is not disoriented. Cranial Nerves: No cranial nerve deficit. Sensory: No sensory deficit. Motor: No weakness, atrophy or abnormal muscle tone.       Coordination: Coordination normal.      Gait: Gait normal.   Psychiatric:         Behavior: Behavior normal. Thought Content: Thought content normal.       DIAGNOSTIC RESULTS     RADIOLOGY:   Non-plain film images such as CT, Ultrasoundand MRI are read by the radiologist. Plain radiographic images are visualized and preliminarily interpreted by the emergency physician with the below findings:    CT shows chronic meningioma stable condition    ED BEDSIDE ULTRASOUND:   Performed by ED Physician - none    LABS:  Labs Reviewed   CBC WITH AUTO DIFFERENTIAL - Abnormal; Notable for the following components:       Result Value    WBC 11.5 (*)     Hemoglobin 10.2 (*)     Hematocrit 32.0 (*)     MCV 79.6 (*)     MCH 25.3 (*)     All other components within normal limits   COMPREHENSIVE METABOLIC PANEL W/ REFLEX TO MG FOR LOW K - Abnormal; Notable for the following components:    Glucose 153 (*)     BUN 35 (*)     Creatinine 1.6 (*)     Est, Glom Filt Rate 33 (*)     All other components within normal limits    Narrative:     Petrona Becerra tel. [phone],  Rejected Test Name/Called to: [facility] See RN, 11/23/2022 22:26, by Keila Lemon   URINALYSIS WITH MICROSCOPIC - Abnormal; Notable for the following components:    Clarity, UA CLOUDY (*)     Ketones, Urine TRACE (*)     Protein, UA TRACE (*)     Leukocyte Esterase, Urine LARGE (*)     Bacteria, UA 2+ (*)     Hyaline Casts, UA 10 (*)     WBC, UA 54 (*)     Epithelial Cells, UA 10 (*)     All other components within normal limits   PROTIME-INR - Abnormal; Notable for the following components:    Protime 16.0 (*)     INR 1.29 (*)     All other components within normal limits   TROPONIN    Narrative:     CALL  Hanna  Kathryn Wilson [phone],  Rejected Test Name/Called to: [facility] See RN, 11/23/2022 22:26, by Keila Lemon   SPECIMEN REJECTION    Narrative:     Petrona Becerra tel. [phone],  Rejected Test Name/Called to: [facility] See RN, 11/23/2022 22:26, by Keila Lemon       All other labs were withinnormal range or not returned as of this dictation.     EMERGENCY DEPARTMENT COURSE and DIFFERENTIAL DIAGNOSIS/MDM:     PMH, Surgical Hx, FH, Social Hx reviewed by myself (ETOH usage, Tobacco usage, Drug usage reviewed by myself, no pertinent Hx)- No Pertinent Hx     Old records were reviewed by me     MDM 73-year-old with what she describes as an anxiety-like state. Most likely stress-induced from bereavement. Planning her loss from  recently. Asymptomatic currently. Does have a mild EVERARDO as well as UTI for which fluids and antibiotics initiated. Admission was offered but patient declined and would like to go home because it is the day before Thanksgiving. She understands the risk of this. She will continue to hydrate and have a repeat BMP done in the next few days. Patient is on seizure prophylaxis with AEDs. States she is compliant. Her CT shows a stable meningioma. Strict return precautions were discussed. Patient verbalized understanding. Labs-   Diagnostic findings  Medications  Consults  Disposition  I estimate there is LOW risk for Sepsis, MI, Stroke, Tamponade, PTX, Toxicity or other life threatening etiology thus I consider the discharge disposition reasonable. The patient is at low risk for mortality based on demographic, history and clinical factors. Given the best available information and clinical assessment, I estimate the risk of hospitalization to be greater than risk of treatment at home. I have explained to the patient that the risk could rapidly change, given precautions for return and instructions. Explained to patient that the risk for mortality is low based on demographic, history and clinical factors. I discussed with patient the results of evaluation in the ED, diagnosis, care, and prognosis. The plan is to discharge to home. Patient is in agreement with plan and questions have been answered. I also discussed with patient the reasons which may require a return visit and the importance of follow-up care.   The patient is well-appearing, nontoxic, and improved at the time of discharge. Patient agrees to call to arrange follow-up care as directed. Patient understands to return immediately for worsening/change in symptoms. I PERSONALLY SAW THE PATIENT AND PERFORMED A SUBSTANTIVE PORTION OF THE VISIT INCLUDING ALL ASPECTS OF THE MEDICAL DECISION MAKING PROCESS. The primary clinician of record [employer]   Total Critical Caretime was 39 minutes, excluding separately reportable procedures. There was a high probability of clinically significant/life threatening deterioration in the patient's condition which required my urgent intervention. CRITICAL CARE  I personally saw the patient and independently provided 39 minutes of non-concurrent critical care out of the total shared critical care time provided. This excludes seperately billable procedures. Critical care time was provided for patient as above that required close evaluation and/or intervention with concern for potential patient decompensation. PROCEDURES:  Unlessotherwise noted below, none    FINAL IMPRESSION      1. Acute cystitis without hematuria    2.  EVERARDO (acute kidney injury) [facility])          DISPOSITION/PLAN   DISPOSITION Discharge - Pending Orders Complete 11/23/2022 10:52:25 PM    PATIENT REFERRED TO:  Aram Osei  [address] [place] [address],[place] [address]  [phone]    Schedule an appointment as soon as possible for a visit in 2 days  for reevaluation    [facility] Emergency Department  [address] [address]  [phone]  Go to   As needed, If symptoms worsen      DISCHARGE MEDICATIONS:  New Prescriptions    No medications on file          (Please note that portions ofthis note were completed with a voice recognition program.  Efforts were made to edit the dictations but occasionally words are mis-transcribed.)    Magdy Murcia MD(electronically signed)  Attending Emergency Physician            Arturo Doherty Nikkie Rodriguez MD  11/24/22 9580

## 2022-11-24 NOTE — ED TRIAGE NOTES
Patient to ED via squad due to feeling weak. Patient states that she got out of recliner to get dinner and couldn't speak. Pt states she felt \"uptight\". She called neighbor to help.   Pt states that she has a benign brain tumor

## 2022-11-24 NOTE — ED NOTES
DC instructions given. Pt verbalized understanding.  [address] in stable condition     Elsa Freeman, RN  11/24/22 0020

## 2022-11-24 NOTE — ED PROVIDER NOTES
I did not perform an evaluation of Jany Lozano but was asked to review her EKG. EKG interpreted by myself.    Normal sinus rhythm with sinus arrhythmia and a rate of 81, left axis deviation, bifascicular block present, , cures 152, QTc 466, no ST elevations, nonspecific ST changes, minimal voltage criteria for LVH, nonspecific changes are compared EKG from 6/14/2022     Michela Morel MD  11/23/22 1682

## 2022-11-24 NOTE — ED PROVIDER NOTES
[address] [facility]        Pt Name: Karla Fregoso  MRN: [id number]  [place] 1945  Date of evaluation: 11/23/2022  Provider: Elda Llanos PA-C  PCP: Doyle Jacob  Note Started: 10:31 PM EST12/2/2022        I have seen and evaluated this patient with my supervising physician Dinorah German MD.      Deandre Ward [address]       Chief Complaint   Patient presents with    Fatigue    Anxiety         HISTORY OF PRESENT ILLNESS   (Location/Symptom, Timing/Onset, Context/Setting, Quality, Duration, Modifying Factors, Severity)  Note limiting factors. Chief Complaint:     Karla Fregoso is a 76 y.o. female who presents to the ED after becoming very anxious. She states this is the first holidays without her  who recently passed away. She got up from her chair and felt weak, so she spoke with her neighbor who recommended she come to ED for evaluation. Symptoms lasted for a couple minutes. She feels back to herself now. She does have some associated symptoms of frequency of urination. Denies lightheadedness, dizziness, chest pain, SOB. Nursing Notes were all reviewed and agreed with or any disagreements were addressed in the HPI. REVIEW OF SYSTEMS    (2-9 systems for level 4, 10 or more for level 5)     Review of Systems   Constitutional:  Negative for chills and fever. HENT:  Negative for ear pain and sore throat. Eyes:  Negative for pain and visual disturbance. Respiratory:  Negative for cough and shortness of breath. Cardiovascular:  Negative for chest pain and leg swelling. Gastrointestinal:  Negative for abdominal pain, constipation, diarrhea, nausea and vomiting. Genitourinary:  Negative for dysuria and hematuria. Musculoskeletal:  Negative for back pain and neck pain. Skin:  Negative for rash and wound. Neurological:  Positive for weakness. Negative for light-headedness and headaches. Psychiatric/Behavioral:  The patient is nervous/anxious.       PAST MEDICAL HISTORY     Past Medical History:   Diagnosis Date    Atrial fibrillation [facility])     Brain tumor (benign) ([facility]     MRI every 6 months has swelling which causes the seizure    CAD (coronary artery disease)     COPD (chronic obstructive pulmonary disease) ([facility]     Patient denies    Diabetes mellitus ([facility]     Hyperlipidemia     Hypertension     Meningioma ([facility]     RBBB (right bundle branch block)     Seizure ([facility]     2019 and 6/2022       SURGICAL HISTORY     Past Surgical History:   Procedure Laterality Date    CARDIOVERSION      HYSTERECTOMY (CERVIX STATUS UNKNOWN)      INTRACAPSULAR CATARACT EXTRACTION Left 8/8/2022    PHACOEMULSIFICATION WITH INTRAOCULAR LENS IMPLANT - LEFT EYE performed by Ernie Wills MD at [address] Right 8/22/2022    PHACOEMULSIFICATION WITH INTRAOCULAR LENS IMPLANT - RIGHT EYE performed by Ernie Wills MD at [address]       Discharge Medication List as of 11/24/2022  8:30 AM        CONTINUE these medications which have NOT CHANGED    Details   levETIRAcetam (KEPPRA) 500 MG tablet Take 1 tablet by mouth 2 times daily, Disp-60 tablet, R-1Normal      atenolol (TENORMIN) 50 MG tablet Take 50 mg by mouth at bedtimeHistorical Med      atorvastatin (LIPITOR) 80 MG tablet Take 80 mg by mouth dailyHistorical Med      flecainide (TAMBOCOR) 50 MG tablet Take 50 mg by mouth 2 times dailyHistorical Med      losartan-hydroCHLOROthiazide (HYZAAR) 100-25 MG per tablet Take 1 tablet by mouth dailyHistorical Med      rivaroxaban (XARELTO) 20 MG TABS tablet Take 20 mg by mouth Daily with supperHistorical Med      amLODIPine (NORVASC) 10 MG tablet Take 10 mg by mouth dailyHistorical Med      metFORMIN (GLUCOPHAGE-XR) 500 MG extended release tablet Take 1,500 mg by mouth daily (with breakfast)Historical Med      Multiple Vitamins-Minerals (THERAPEUTIC MULTIVITAMIN-MINERALS) tablet Take 1 tablet by mouth dailyHistorical Med      Misc Natural Products ([address] MSM FORMULA) TABS Take 1 tablet by mouth dailyHistorical Med      vitamin D (CHOLECALCIFEROL) 25 MCG (1000 UT) TABS tablet Take 1,000 Units by mouth dailyHistorical Med      ferrous sulfate (FE TABS 325) 325 (65 Fe) MG EC tablet Take 325 mg by mouth 2 times dailyHistorical Med             ALLERGIES     Patient has no known allergies. FAMILYHISTORY     History reviewed. No pertinent family history. SOCIAL HISTORY       Social History     Socioeconomic History    Marital status:      Spouse name: None    Number of children: None    Years of education: None    Highest education level: None   Tobacco Use    Smoking status: Never    Smokeless tobacco: Never   Vaping Use    Vaping Use: Never used   Substance and Sexual Activity    Alcohol use: Never    Drug use: Never       SCREENINGS    Christina Coma Scale  Eye Opening: Spontaneous  Best Verbal Response: Oriented  Best Motor Response: Obeys commands  Christina Coma Scale Score: 15        PHYSICAL EXAM    (up to 7 for level 4, 8 or more for level 5)     ED Triage Vitals   BP Temp Temp Source Heart Rate Resp SpO2 Height Weight   11/23/22 2058 11/23/22 2058 11/23/22 2058 11/23/22 2058 11/23/22 2058 11/23/22 2058 -- 11/23/22 2100   (!) 147/57 98.8 °F (37.1 °C) Oral 82 19 100 %  183 lb 3.2 oz (83.1 kg)       Physical Exam  Constitutional:       General: She is not in acute distress. Appearance: Normal appearance. She is not ill-appearing, toxic-appearing or diaphoretic. HENT:      Head: Normocephalic and atraumatic. Right Ear: External ear normal.      Left Ear: External ear normal.      Nose: Nose normal.   Eyes:      General: No visual field deficit. Right eye: No discharge. Left eye: No discharge. Cardiovascular:      Rate and Rhythm: Normal rate and regular rhythm.       Pulses: Normal pulses. Heart sounds: Normal heart sounds. No murmur heard. No gallop. Pulmonary:      Effort: Pulmonary effort is normal. No respiratory distress. Breath sounds: Normal breath sounds. No stridor. No wheezing, rhonchi or rales. Musculoskeletal:         General: Normal range of motion. Cervical back: Normal range of motion. Skin:     General: Skin is warm and dry. Neurological:      General: No focal deficit present. Mental Status: She is alert and oriented to person, place, and time. Cranial Nerves: No cranial nerve deficit, dysarthria or facial asymmetry. Sensory: Sensation is intact. No sensory deficit. Motor: No weakness or pronator drift.       Coordination: Finger-Nose-Finger Test and Heel to [employer] normal.      Gait: Gait normal.      Comments: No horizontal or vertical nystagmus  Negative test of skew  Normal gait  FAROM and CANDIDO in all 4 extremities   Psychiatric:         Mood and Affect: Mood normal.         Behavior: Behavior normal.       DIAGNOSTIC RESULTS   LABS:    Labs Reviewed   CBC WITH AUTO DIFFERENTIAL - Abnormal; Notable for the following components:       Result Value    WBC 11.5 (*)     Hemoglobin 10.2 (*)     Hematocrit 32.0 (*)     MCV 79.6 (*)     MCH 25.3 (*)     All other components within normal limits   COMPREHENSIVE METABOLIC PANEL W/ REFLEX TO MG FOR LOW K - Abnormal; Notable for the following components:    Glucose 153 (*)     BUN 35 (*)     Creatinine 1.6 (*)     Est, Glom Filt Rate 33 (*)     All other components within normal limits    Narrative:     CALL  Jacques MURRAY tel. [phone],  Rejected Test Name/Called to: LINWOOD Fernandez RN, 11/23/2022 22:26, by Leland Gamble   URINALYSIS WITH MICROSCOPIC - Abnormal; Notable for the following components:    Clarity, UA CLOUDY (*)     Ketones, Urine TRACE (*)     Protein, UA TRACE (*)     Leukocyte Esterase, Urine LARGE (*)     Bacteria, UA 2+ (*)     Hyaline Casts, UA 10 (*)     WBC, UA 54 (*) Epithelial Cells, UA 10 (*)     All other components within normal limits   PROTIME-INR - Abnormal; Notable for the following components:    Protime 16.0 (*)     INR 1.29 (*)     All other components within normal limits   TROPONIN    Narrative:     CALL  Hanna  Dante Flores [phone],  Rejected Test Name/Called to: LINWOOD Rosenberg RN, 11/23/2022 22:26, by [address]    Narrative:     Damien Dyson tel. [phone],  Rejected Test Name/Called to: LINWOOD Rosenberg RN, 11/23/2022 22:26, by Suki Quick       When ordered, only abnormal lab results are displayed. All other labs were within normal range or not returned as of this dictation. EKG: When ordered, EKG's are interpreted by the Emergency Department Physician in the absence of a cardiologist.  Please see their note for interpretation of EKG. RADIOLOGY:   Non-plain film images such as CT, Ultrasound and MRI are read by the radiologist. Plain radiographic images are visualized and preliminarily interpreted by the  ED Provider with the below findings:        Interpretation [facility] Radiologist below, if available at the time of this note:    CT HEAD WO CONTRAST   Final Result   No acute intracranial abnormality detected. Hyperdense extra-axial mass in the left frontal region, which is not   substantially changed when compared to the previous exams. Please refer to   the MRI brain from 06/14/2022 for more detailed evaluation of that. XR CHEST (2 VW)   Final Result   Stable mild cardiomegaly      Mild bibasilar discoid atelectasis or scarring with no infiltrate or effusion.            XR CHEST (2 VW)    Result Date: 11/23/2022  EXAMINATION: TWO XRAY VIEWS OF THE CHEST 11/23/2022 9:28 pm COMPARISON: 06/13/2022 HISTORY: ORDERING SYSTEM PROVIDED HISTORY: shortness of breath, weakness TECHNOLOGIST PROVIDED HISTORY: Reason for exam:->shortness of breath, weakness Reason for Exam: shortness of breath, weakness FINDINGS: The heart is borderline enlarged but unchanged. The pulmonary vessels are normal.  There mild linear densities along the lung bases. No consolidation or effusion is seen. The bones are intact. Stable mild cardiomegaly Mild bibasilar discoid atelectasis or scarring with no infiltrate or effusion. CT HEAD WO CONTRAST    Result Date: 11/23/2022  EXAMINATION: CT OF THE HEAD WITHOUT CONTRAST  11/23/2022 6:36 pm TECHNIQUE: CT of the head was performed without the administration of intravenous contrast. Automated exposure control, iterative reconstruction, and/or weight based adjustment of the mA/kV was utilized to reduce the radiation dose to as low as reasonably achievable. COMPARISON: 06/13/2022 HISTORY: ORDERING SYSTEM PROVIDED HISTORY: weakness, change in speech earlier, no symptoms now TECHNOLOGIST PROVIDED HISTORY: Reason for exam:->weakness, change in speech earlier, no symptoms now Has a \"code stroke\" or \"stroke alert\" been called? ->No Decision Support Exception - unselect if not a suspected or confirmed emergency medical condition->Emergency Medical Condition (MA) Reason for Exam: fatigue; anxiety FINDINGS: BRAIN/VENTRICLES: There is redemonstration of a hyperdense extra-axial mass which measures maximally 3.4 cm, unchanged when compared to previous exams. The mass is seen adjacent to the left planum sphenoidale and cribriform plate. There is vasogenic edema within the subjacent left frontal lobe. Amount of vasogenic edema is similar when compared to previous exams. With that said, acute loss of the gray-white matter differentiation is not identified. No hemorrhages are seen within the brain parenchyma. No midline shift. The basilar cisterns are patent. Intracranial vasculature is unremarkable in appearance. ORBITS: The visualized portion of the orbits demonstrate no acute abnormality. SINUSES: The visualized paranasal sinuses and mastoid air cells demonstrate no acute abnormality.  SOFT TISSUES/SKULL:  No acute abnormality of the visualized skull or soft tissues. No acute intracranial abnormality detected. Hyperdense extra-axial mass in the left frontal region, which is not substantially changed when compared to the previous exams. Please refer to the MRI brain from 06/14/2022 for more detailed evaluation of that. PROCEDURES   Unless otherwise noted below, none     Procedures    CRITICAL CARE NOTE:    ISHAN Page am the primary clinician of record. There was a high probability of clinically significant life-threatening deterioration of the patient's condition requiring my urgent intervention. Total critical care time was at least 10 minutes. Of nonconcurrent critical care time. This includes vital sign monitoring, pulse oximetry monitoring, telemetry monitoring, clinical response to the IV medications, reviewing the nursing notes, consultation time, dictation/documentation time, and interpretation of the labwork. This excludes any separately billable procedures performed. CONSULTS:  None      EMERGENCY DEPARTMENT COURSE and DIFFERENTIAL DIAGNOSIS/MDM:   Vitals:    Vitals:    11/23/22 2058 11/23/22 2100   BP: (!) 147/57    Pulse: 82    Resp: 19    Temp: 98.8 °F (37.1 °C)    TempSrc: Oral    SpO2: 100%    Weight:  183 lb 3.2 oz (83.1 kg)       Patient was given the following medications:  Medications   0.9 % sodium chloride bolus (0 mLs IntraVENous Stopped 11/24/22 0042)   cefTRIAXone (ROCEPHIN) 1,000 mg in dextrose 5 % 50 mL IVPB mini-bag (0 mg IntraVENous Stopped 11/23/22 2333)         Is this patient to be included in the SEP-1 Core Measure due to severe sepsis or septic shock?    No   Exclusion criteria - the patient is NOT to be included for SEP-1 Core Measure due to:  2+ SIRS criteria are not met    This is a 76y.o. year old, well appearing female with PMH of DM2, afib htn, CAD, COPD who presents to the ED with complaint of weakness, anxiety state that began prior to arrival. Vitals upon arrival show mildly htn, otherwise wnl. Physical Exam shows no acute abnormality. NIH 0. Blood work was done and shows:   -mild leukocytosis of 11.5  -mild anemia of 10.2  -mild EVERARDO of creat 1.6  Urinalysis: indicative of infection  EKG: interpreted by attending    Imaging was reviewed and interpreted by radiologist as above showing:   -no acute abnormality    Ddx includes: anxiety, cystitis, CVA, other  Management Given:  -fluids, rocephin, symptoms improved    Disposition: discussed with patient discharge vs admission. Patient states that she would rather go home as tomorrow is Thanksgiving. She was given fluids and abx here. She felt comfortable and will call her PCP as soon as possible. Patient was informed to return to the Emergency Department if any new worsening or more concerning symptoms occur, in agreement with plan. Shared decision making was practiced, and patient discharged in stable condition. All questions were answered. I am the Primary Clinician of Record. FINAL IMPRESSION      1. Acute cystitis without hematuria    2.  EVERARDO (acute kidney injury) [facility])          DISPOSITION/PLAN   DISPOSITION Decision To Discharge 11/24/2022 12:43:35 AM      PATIENT REFERREDTO:  [place] [id number]  [phone]    Schedule an appointment as soon as possible for a visit in 2 days  for reevaluation    [facility] Emergency Department  [phone]  Go to   As needed, If symptoms worsen    DISCHARGE MEDICATIONS:  Discharge Medication List as of 11/24/2022  8:30 AM        START taking these medications    Details   cefUROXime (CEFTIN) 250 MG tablet Take 1 tablet by mouth 2 times daily for 7 days, Disp-14 tablet, R-0Normal             DISCONTINUED MEDICATIONS:  Discharge Medication List as of 11/24/2022  8:30 AM                 (Please note that portions ofthis note were completed with a voice recognition program.  Efforts were made to edit the dictations but occasionally words are mis-transcribed.)    Elyse Chavarria PA-C (electronically signed)             Elyse Chavarria PA-C  12/02/22 7108

## 2022-12-02 ASSESSMENT — ENCOUNTER SYMPTOMS
BACK PAIN: 0
DIARRHEA: 0
COUGH: 0
NAUSEA: 0
EYE PAIN: 0
ABDOMINAL PAIN: 0
SHORTNESS OF BREATH: 0
CONSTIPATION: 0
SORE THROAT: 0
VOMITING: 0

## 2024-03-15 LAB
ALBUMIN URINE, EXTERNAL: 38.3
CREATININE, URINE, EXTERNAL: 134.7
MICROALBUMIN/CREAT UR: 28 MG/G{CREAT}

## 2024-04-26 NOTE — CONSULTS
Forms/Letter Request    Type of form/letter: FMLA - Unknown      Do we have the form/letter: Yes:     Who is the form from? Insurance comp    Where did/will the form come from? form was faxed in    When is form/letter needed by: at your earliest time     How would you like the form/letter returned: Fax : [phone]    Patient Notified form requests are processed in 5-7 business days:No    Could we send this information to you in Tervela or would you prefer to receive a phone call?:   Patient would like to be contacted via [employer]    [address] [facility]  [phone]   1945 8/1/2022    Requesting physician: Jocelin Nicholson MD    Reason for consultation: Olfactory Meningoma     History of present illness: Patient is a 76 y.o. female w/ PMH of Olfactory meningoma, DM2, HTN, Afib, and LA NENA who presented on 8/1/2022 with altered mental status to the emergency room. Patient was found by her friend after not answering the phone since the night before at 10 PM. When her friend went to see her she found the patient on the ground with urinary incontinence and altered mental status. No witness seizure activity or tongue biting. The friend called EMS. She was then picked up by EMS and was complaining of abdominal pain and brought in for UTI work up for multiple urinary tract infections in the past with a recent admission for left hydronephrosis (4/30/22). Patient denies short of breath. Denies flank pain or back pain. Denies any focal weakness or numbness. No diaphoresis. Head CT scan was ordered in the Emergency Room and showed a 3 x 2 x 1.8cm mass in the anterior inferior left frontal lobe with a density higher than normal brain parenchyma. Patient told me \"I stopped taking my seizure medication and I do not remember why I stopped it\". Patient was previously on seizure prophylaxis but unknown which one she was on or when she stopped taking medication. Patient was loaded with Keppra 1000mg in the ER and continue 500mg BID. Meningoma has been known since 2019 and being surveillance since then. Patient not the best historian and some information was retrieved via chart check. ROS:   GENERAL:  Denies fever or recent illness.  Denies weight changes   EYES:  Denies vision change or diplopia  EARS:  Denies hearing loss  CARDIAC:  Denies chest pain  RESPIRATORY:  Denies shortness of breath  SKIN:  Denies rash or lesions   HEM:  Denies excessive bruising  PSYCH:  Denies anxiety or depression  NEURO:  Denies headache, numbness or tingling or lateralizing weakness   :  Denies urinary difficulty  GI: Denies nausea, vomiting, diarrhea or constipation. Abdominal pain +  MUSCULOSKELETAL:  No arthralgias    No Known Allergies    Past Medical History:   Diagnosis Date    Atrial fibrillation ([address]     Brain tumor (benign) ([address]     MRI every 6 months has swelling which causes the seizure    CAD (coronary artery disease)     COPD (chronic obstructive pulmonary disease) ([address]     Patient denies    Diabetes mellitus ([address]     Hyperlipidemia     Hypertension     Meningioma (HCC)     RBBB (right bundle branch block)     Seizure ([address]     2019 and 2022        Past Surgical History:   Procedure Laterality Date    CARDIOVERSION      HYSTERECTOMY (CERVIX STATUS UNKNOWN)      TONSILLECTOMY         Social History     Occupational History    Not on file   Tobacco Use    Smoking status: Never    Smokeless tobacco: Never   Vaping Use    Vaping Use: Never used   Substance and Sexual Activity    Alcohol use: Never    Drug use: Never    Sexual activity: Not on file        No family history on file. Outpatient Medications Marked as Taking for the 22 encounter [facility] Encounter)   Medication Sig Dispense Refill    [] dexamethasone (DECADRON) 4 MG tablet Take 1 tablet by mouth every 8 hours for 1 day, THEN 1 tablet 2 times daily for 3 days, THEN 1 tablet daily (with breakfast) for 3 days.  12 tablet 0    levETIRAcetam (KEPPRA) 500 MG tablet Take 1 tablet by mouth 2 times daily 60 tablet 1    [DISCONTINUED] pantoprazole (PROTONIX) 40 MG tablet Take 1 tablet by mouth every morning (before breakfast) 30 tablet 0    atenolol (TENORMIN) 50 MG tablet Take 50 mg by mouth at bedtime      flecainide (TAMBOCOR) 50 MG tablet Take 50 mg by mouth 2 times daily      losartan-hydroCHLOROthiazide (HYZAAR) 100-25 MG per tablet Take 1 tablet by mouth daily      rivaroxaban (XARELTO) 20 MG TABS tablet Take 20 mg by mouth Daily with supper      amLODIPine (NORVASC) 10 MG tablet Take 10 mg by mouth daily      metFORMIN (GLUCOPHAGE-XR) 500 MG extended release tablet Take 1,500 mg by mouth daily (with breakfast)      Multiple Vitamins-Minerals (THERAPEUTIC MULTIVITAMIN-MINERALS) tablet Take 1 tablet by mouth daily      Misc Natural Products (GLUCOSAMINE CHOND MSM FORMULA) TABS Take 1 tablet by mouth daily      vitamin D (CHOLECALCIFEROL) 25 MCG (1000 UT) TABS tablet Take 1,000 Units by mouth daily      ferrous sulfate (FE TABS 325) 325 (65 Fe) MG EC tablet Take 325 mg by mouth 2 times daily (Patient not taking: Reported on 7/29/2022)        No current facility-administered medications for this encounter.      Current Outpatient Medications   Medication Sig Dispense Refill    levETIRAcetam (KEPPRA) 500 MG tablet Take 1 tablet by mouth 2 times daily 60 tablet 1    atenolol (TENORMIN) 50 MG tablet Take 50 mg by mouth at bedtime      flecainide (TAMBOCOR) 50 MG tablet Take 50 mg by mouth 2 times daily      losartan-hydroCHLOROthiazide (HYZAAR) 100-25 MG per tablet Take 1 tablet by mouth daily      rivaroxaban (XARELTO) 20 MG TABS tablet Take 20 mg by mouth Daily with supper      amLODIPine (NORVASC) 10 MG tablet Take 10 mg by mouth daily      metFORMIN (GLUCOPHAGE-XR) 500 MG extended release tablet Take 1,500 mg by mouth daily (with breakfast)      Multiple Vitamins-Minerals (THERAPEUTIC MULTIVITAMIN-MINERALS) tablet Take 1 tablet by mouth daily      Misc Natural Products (GLUCOSAMINE CHOND MSM FORMULA) TABS Take 1 tablet by mouth daily      vitamin D (CHOLECALCIFEROL) 25 MCG (1000 UT) TABS tablet Take 1,000 Units by mouth daily      ferrous sulfate (FE TABS 325) 325 (65 Fe) MG EC tablet Take 325 mg by mouth 2 times daily (Patient not taking: Reported on 7/29/2022)      atorvastatin (LIPITOR) 80 MG tablet Take 80 mg by mouth daily (Patient not taking: No sig reported)        Objective:  BP (!) 152/66   Pulse 88   Temp 97.6 °F (36.4 °C) (Oral)   Resp 18   Ht 5' 5\" (1.651 m)   Wt 172 lb 6.4 oz (78.2 kg)   SpO2 95%   BMI 28.69 kg/m²     Physical Exam:  Patient seen and examined   General: Well developed. Drowsy and cooperative in no acute distress. HENT: atraumatic, neck supple  Eyes: Optic discs: Not tested  Pulmonary: unlabored respiratory effort  Cardiovascular:  Warm well perfused. No peripheral edema  Gastrointestinal: abdomen soft, NT, ND    Neurologic Exam:  Neurological:  Mental Status: Drowsy, oriented but confused on year, speech clear and appropriate  Attention: Intact  Language: No aphasia or dysarthria noted  Sensation: Intact to all extremities to light touch  Coordination: Intact    Cranial Nerves:  Cranial Nerves:  II: Visual acuity not tested, denies new visual changes / diplopia  III, IV, VI: PERRL, 4 mm bilaterally, EOMI, no nystagmus noted  V: Facial sensation intact bilaterally to touch  VII: Face symmetric  VIII: Hearing intact bilaterally to spoken voice  IX: Palate movement equal bilaterally  XI: Shoulder shrug equal bilaterally  XII: Tongue midline    Musculoskeletal:   Gait: Not tested   Assist devices: None   Tone: normal  Motor strength: general weakness d/t severe stomach pain   Right  Left    Right  Left    Deltoid  5 5  Hip Flex  5 5   Biceps  5 5  Knee Extensors  5 5   Triceps  5 5  Knee Flexors  5 5   Wrist Ext  5 5  Ankle Dorsiflex. 5 5   Wrist Flex  5 5  Ankle Plantarflex. 5 5   Handgrip  5 5  Ext Collins Longus  5 5   Thumb Ext  5 5             Radiological Findings:    I have personally reviewed the MRI of the brain dated 6/14/2022: This demonstrates a 3.5 cm mass in the anterior fossa, favored to be a meningioma. There is adjacent peritumoral edema. Labs  No results for input(s): NA, POTASSIUM, CL, CO2, BUN, CREATININE, GLUCOSE, CA, ALB, PHOS, MG in the last 72 hours. No results for input(s): WBC, RBC, HEMOGLOBIN, PTT, INR in the last 72 hours.     Invalid input(s): HEMATOCRIT, PLATELETS, PROA, PT, PTTA        Patient Active Problem List Diagnosis Date Noted    Meningioma ([facility] 06/14/2022    Nonintractable generalized idiopathic epilepsy without status epilepticus ([facility] 06/14/2022    Encephalopathy acute 06/14/2022    Diabetes mellitus [facility])        Assessment:     76 yoa female came in Altered mental status and urinary incontinence. Known history of left frontal meningioma concerning for seizure . Pt was complaining of headache prior to seizure occurring and non-compliant with previous seizure prophylaxis for unknown period of time.        Plan:  No emergent neurosurgical intervention indicated  Restart of AED for seizure management per neurology recommendations  May require resection as outpatient, will arrange for follow-up at [address] [place] [address] MD Brannon, PhD  Yany Najera  Director, [address] [place] [address], [facility] (c), [phone] (o)    Electronically signed by: Isabelle Mcintyre MD, 8/1/2022 6:55 PM

## 2024-06-19 ENCOUNTER — HOSPITAL ENCOUNTER (OUTPATIENT)
Age: 79
Setting detail: OBSERVATION
Discharge: HOME | End: 2024-06-21
Attending: EMERGENCY MEDICINE | Admitting: STUDENT IN AN ORGANIZED HEALTH CARE EDUCATION/TRAINING PROGRAM
Payer: MEDICARE

## 2024-06-19 ENCOUNTER — APPOINTMENT (OUTPATIENT)
Dept: GENERAL RADIOLOGY | Age: 79
End: 2024-06-19
Payer: MEDICARE

## 2024-06-19 DIAGNOSIS — I48.0 PAROXYSMAL ATRIAL FIBRILLATION (HCC): ICD-10-CM

## 2024-06-19 DIAGNOSIS — R06.02 SHORTNESS OF BREATH: ICD-10-CM

## 2024-06-19 DIAGNOSIS — R07.9 CHEST PAIN, UNSPECIFIED TYPE: Primary | ICD-10-CM

## 2024-06-19 DIAGNOSIS — R00.2 PALPITATIONS: ICD-10-CM

## 2024-06-19 LAB
ALBUMIN SERPL-MCNC: 3.9 G/DL (ref 3.4–5)
ALBUMIN/GLOB SERPL: 1.1 {RATIO} (ref 1.1–2.2)
ALP SERPL-CCNC: 123 U/L (ref 40–129)
ALT SERPL-CCNC: 43 U/L (ref 10–40)
ANION GAP SERPL CALCULATED.3IONS-SCNC: 15 MMOL/L (ref 3–16)
AST SERPL-CCNC: 48 U/L (ref 15–37)
BASE EXCESS BLDV CALC-SCNC: 4.2 MMOL/L
BASOPHILS # BLD: 0.1 K/UL (ref 0–0.2)
BASOPHILS NFR BLD: 1 %
BILIRUB SERPL-MCNC: 0.4 MG/DL (ref 0–1)
BUN SERPL-MCNC: 22 MG/DL (ref 7–20)
CALCIUM SERPL-MCNC: 10.4 MG/DL (ref 8.3–10.6)
CHLORIDE SERPL-SCNC: 102 MMOL/L (ref 99–110)
CO2 BLDV-SCNC: 26 MMOL/L
CO2 SERPL-SCNC: 21 MMOL/L (ref 21–32)
COHGB MFR BLDV: 1.4 %
CREAT SERPL-MCNC: 0.8 MG/DL (ref 0.6–1.2)
DEPRECATED RDW RBC AUTO: 14.4 % (ref 12.4–15.4)
EKG ATRIAL RATE: 79 BPM
EKG ATRIAL RATE: 89 BPM
EKG DIAGNOSIS: NORMAL
EKG DIAGNOSIS: NORMAL
EKG P AXIS: 29 DEGREES
EKG P AXIS: 45 DEGREES
EKG P-R INTERVAL: 150 MS
EKG P-R INTERVAL: 162 MS
EKG Q-T INTERVAL: 426 MS
EKG Q-T INTERVAL: 432 MS
EKG QRS DURATION: 154 MS
EKG QRS DURATION: 156 MS
EKG QTC CALCULATION (BAZETT): 488 MS
EKG QTC CALCULATION (BAZETT): 525 MS
EKG R AXIS: -69 DEGREES
EKG R AXIS: -77 DEGREES
EKG T AXIS: 27 DEGREES
EKG T AXIS: 28 DEGREES
EKG VENTRICULAR RATE: 79 BPM
EKG VENTRICULAR RATE: 89 BPM
EOSINOPHIL # BLD: 0.3 K/UL (ref 0–0.6)
EOSINOPHIL NFR BLD: 2.9 %
GFR SERPLBLD CREATININE-BSD FMLA CKD-EPI: 74 ML/MIN/{1.73_M2}
GLUCOSE BLD-MCNC: 102 MG/DL (ref 70–99)
GLUCOSE BLD-MCNC: 241 MG/DL (ref 70–99)
GLUCOSE SERPL-MCNC: 149 MG/DL (ref 70–99)
HCO3 BLDV-SCNC: 25 MMOL/L (ref 23–29)
HCT VFR BLD AUTO: 38.4 % (ref 36–48)
HGB BLD-MCNC: 13 G/DL (ref 12–16)
LACTATE BLDV-SCNC: 1.6 MMOL/L (ref 0.4–2)
LACTATE BLDV-SCNC: 3.3 MMOL/L (ref 0.4–2)
LYMPHOCYTES # BLD: 2.6 K/UL (ref 1–5.1)
LYMPHOCYTES NFR BLD: 28.5 %
MCH RBC QN AUTO: 28.5 PG (ref 26–34)
MCHC RBC AUTO-ENTMCNC: 33.9 G/DL (ref 31–36)
MCV RBC AUTO: 84.1 FL (ref 80–100)
METHGB MFR BLDV: 0.2 %
MONOCYTES # BLD: 0.8 K/UL (ref 0–1.3)
MONOCYTES NFR BLD: 9.1 %
NEUTROPHILS # BLD: 5.4 K/UL (ref 1.7–7.7)
NEUTROPHILS NFR BLD: 58.5 %
O2 THERAPY: ABNORMAL
PCO2 BLDV: 26.9 MMHG (ref 40–50)
PERFORMED ON: ABNORMAL
PERFORMED ON: ABNORMAL
PH BLDV: 7.58 [PH] (ref 7.35–7.45)
PLATELET # BLD AUTO: 343 K/UL (ref 135–450)
PMV BLD AUTO: 8.5 FL (ref 5–10.5)
PO2 BLDV: 31 MMHG
POTASSIUM SERPL-SCNC: 3.7 MMOL/L (ref 3.5–5.1)
PROT SERPL-MCNC: 7.4 G/DL (ref 6.4–8.2)
RBC # BLD AUTO: 4.56 M/UL (ref 4–5.2)
SAO2 % BLDV: 74 %
SODIUM SERPL-SCNC: 138 MMOL/L (ref 136–145)
TROPONIN, HIGH SENSITIVITY: 17 NG/L (ref 0–14)
TROPONIN, HIGH SENSITIVITY: 17 NG/L (ref 0–14)
TSH SERPL DL<=0.005 MIU/L-ACNC: 2.37 UIU/ML (ref 0.27–4.2)
WBC # BLD AUTO: 9.2 K/UL (ref 4–11)

## 2024-06-19 PROCEDURE — 84484 ASSAY OF TROPONIN QUANT: CPT

## 2024-06-19 PROCEDURE — 2580000003 HC RX 258: Performed by: STUDENT IN AN ORGANIZED HEALTH CARE EDUCATION/TRAINING PROGRAM

## 2024-06-19 PROCEDURE — 84443 ASSAY THYROID STIM HORMONE: CPT

## 2024-06-19 PROCEDURE — 83605 ASSAY OF LACTIC ACID: CPT

## 2024-06-19 PROCEDURE — 80053 COMPREHEN METABOLIC PANEL: CPT

## 2024-06-19 PROCEDURE — 2580000003 HC RX 258

## 2024-06-19 PROCEDURE — G0378 HOSPITAL OBSERVATION PER HR: HCPCS

## 2024-06-19 PROCEDURE — 71045 X-RAY EXAM CHEST 1 VIEW: CPT

## 2024-06-19 PROCEDURE — 83036 HEMOGLOBIN GLYCOSYLATED A1C: CPT

## 2024-06-19 PROCEDURE — 94760 N-INVAS EAR/PLS OXIMETRY 1: CPT

## 2024-06-19 PROCEDURE — 6370000000 HC RX 637 (ALT 250 FOR IP): Performed by: STUDENT IN AN ORGANIZED HEALTH CARE EDUCATION/TRAINING PROGRAM

## 2024-06-19 PROCEDURE — 96374 THER/PROPH/DIAG INJ IV PUSH: CPT

## 2024-06-19 PROCEDURE — 99285 EMERGENCY DEPT VISIT HI MDM: CPT

## 2024-06-19 PROCEDURE — 93010 ELECTROCARDIOGRAM REPORT: CPT | Performed by: INTERNAL MEDICINE

## 2024-06-19 PROCEDURE — 93005 ELECTROCARDIOGRAM TRACING: CPT | Performed by: EMERGENCY MEDICINE

## 2024-06-19 PROCEDURE — 6360000002 HC RX W HCPCS

## 2024-06-19 PROCEDURE — 85025 COMPLETE CBC W/AUTO DIFF WBC: CPT

## 2024-06-19 PROCEDURE — 82803 BLOOD GASES ANY COMBINATION: CPT

## 2024-06-19 RX ORDER — SODIUM CHLORIDE 0.9 % (FLUSH) 0.9 %
5-40 SYRINGE (ML) INJECTION EVERY 12 HOURS SCHEDULED
Status: DISCONTINUED | OUTPATIENT
Start: 2024-06-19 | End: 2024-06-21 | Stop reason: HOSPADM

## 2024-06-19 RX ORDER — POTASSIUM CHLORIDE 20 MEQ/1
40 TABLET, EXTENDED RELEASE ORAL PRN
Status: DISCONTINUED | OUTPATIENT
Start: 2024-06-19 | End: 2024-06-21 | Stop reason: HOSPADM

## 2024-06-19 RX ORDER — VITAMIN B COMPLEX
1000 TABLET ORAL DAILY
Status: DISCONTINUED | OUTPATIENT
Start: 2024-06-19 | End: 2024-06-21 | Stop reason: HOSPADM

## 2024-06-19 RX ORDER — FLECAINIDE ACETATE 100 MG/1
50 TABLET ORAL 2 TIMES DAILY
Status: DISCONTINUED | OUTPATIENT
Start: 2024-06-19 | End: 2024-06-21 | Stop reason: HOSPADM

## 2024-06-19 RX ORDER — ACETAMINOPHEN 325 MG/1
650 TABLET ORAL EVERY 6 HOURS PRN
Status: DISCONTINUED | OUTPATIENT
Start: 2024-06-19 | End: 2024-06-21 | Stop reason: HOSPADM

## 2024-06-19 RX ORDER — GLUCAGON 1 MG/ML
1 KIT INJECTION PRN
Status: DISCONTINUED | OUTPATIENT
Start: 2024-06-19 | End: 2024-06-21 | Stop reason: HOSPADM

## 2024-06-19 RX ORDER — ATORVASTATIN CALCIUM 80 MG/1
80 TABLET, FILM COATED ORAL NIGHTLY
Status: DISCONTINUED | OUTPATIENT
Start: 2024-06-19 | End: 2024-06-21 | Stop reason: HOSPADM

## 2024-06-19 RX ORDER — POTASSIUM CHLORIDE 7.45 MG/ML
10 INJECTION INTRAVENOUS PRN
Status: DISCONTINUED | OUTPATIENT
Start: 2024-06-19 | End: 2024-06-21 | Stop reason: HOSPADM

## 2024-06-19 RX ORDER — ACETAMINOPHEN 650 MG/1
650 SUPPOSITORY RECTAL EVERY 6 HOURS PRN
Status: DISCONTINUED | OUTPATIENT
Start: 2024-06-19 | End: 2024-06-21 | Stop reason: HOSPADM

## 2024-06-19 RX ORDER — 0.9 % SODIUM CHLORIDE 0.9 %
1000 INTRAVENOUS SOLUTION INTRAVENOUS ONCE
Status: COMPLETED | OUTPATIENT
Start: 2024-06-19 | End: 2024-06-19

## 2024-06-19 RX ORDER — AMLODIPINE BESYLATE 10 MG/1
10 TABLET ORAL DAILY
Status: DISCONTINUED | OUTPATIENT
Start: 2024-06-19 | End: 2024-06-21 | Stop reason: HOSPADM

## 2024-06-19 RX ORDER — ATENOLOL 25 MG/1
12.5 TABLET ORAL NIGHTLY
Status: DISCONTINUED | OUTPATIENT
Start: 2024-06-19 | End: 2024-06-21 | Stop reason: HOSPADM

## 2024-06-19 RX ORDER — INSULIN LISPRO 100 [IU]/ML
0-4 INJECTION, SOLUTION INTRAVENOUS; SUBCUTANEOUS NIGHTLY
Status: DISCONTINUED | OUTPATIENT
Start: 2024-06-19 | End: 2024-06-21 | Stop reason: HOSPADM

## 2024-06-19 RX ORDER — INSULIN LISPRO 100 [IU]/ML
0-4 INJECTION, SOLUTION INTRAVENOUS; SUBCUTANEOUS
Status: DISCONTINUED | OUTPATIENT
Start: 2024-06-19 | End: 2024-06-21 | Stop reason: HOSPADM

## 2024-06-19 RX ORDER — LOSARTAN POTASSIUM 100 MG/1
100 TABLET ORAL DAILY
Status: DISCONTINUED | OUTPATIENT
Start: 2024-06-19 | End: 2024-06-21 | Stop reason: HOSPADM

## 2024-06-19 RX ORDER — LOSARTAN POTASSIUM AND HYDROCHLOROTHIAZIDE 25; 100 MG/1; MG/1
1 TABLET ORAL DAILY
Status: DISCONTINUED | OUTPATIENT
Start: 2024-06-19 | End: 2024-06-19

## 2024-06-19 RX ORDER — SODIUM CHLORIDE 9 MG/ML
INJECTION, SOLUTION INTRAVENOUS PRN
Status: DISCONTINUED | OUTPATIENT
Start: 2024-06-19 | End: 2024-06-21 | Stop reason: HOSPADM

## 2024-06-19 RX ORDER — POLYETHYLENE GLYCOL 3350 17 G/17G
17 POWDER, FOR SOLUTION ORAL DAILY PRN
Status: DISCONTINUED | OUTPATIENT
Start: 2024-06-19 | End: 2024-06-21 | Stop reason: HOSPADM

## 2024-06-19 RX ORDER — HYDROCHLOROTHIAZIDE 25 MG/1
25 TABLET ORAL DAILY
Status: DISCONTINUED | OUTPATIENT
Start: 2024-06-19 | End: 2024-06-21 | Stop reason: HOSPADM

## 2024-06-19 RX ORDER — LORAZEPAM 2 MG/ML
1 INJECTION INTRAMUSCULAR ONCE
Status: COMPLETED | OUTPATIENT
Start: 2024-06-19 | End: 2024-06-19

## 2024-06-19 RX ORDER — MAGNESIUM SULFATE IN WATER 40 MG/ML
2000 INJECTION, SOLUTION INTRAVENOUS PRN
Status: DISCONTINUED | OUTPATIENT
Start: 2024-06-19 | End: 2024-06-21 | Stop reason: HOSPADM

## 2024-06-19 RX ORDER — LEVETIRACETAM 500 MG/1
500 TABLET ORAL 2 TIMES DAILY
Status: DISCONTINUED | OUTPATIENT
Start: 2024-06-19 | End: 2024-06-21 | Stop reason: HOSPADM

## 2024-06-19 RX ORDER — DEXTROSE MONOHYDRATE 100 MG/ML
INJECTION, SOLUTION INTRAVENOUS CONTINUOUS PRN
Status: DISCONTINUED | OUTPATIENT
Start: 2024-06-19 | End: 2024-06-21 | Stop reason: HOSPADM

## 2024-06-19 RX ORDER — SODIUM CHLORIDE 0.9 % (FLUSH) 0.9 %
5-40 SYRINGE (ML) INJECTION PRN
Status: DISCONTINUED | OUTPATIENT
Start: 2024-06-19 | End: 2024-06-21 | Stop reason: HOSPADM

## 2024-06-19 RX ADMIN — FLECAINIDE ACETATE 50 MG: 100 TABLET ORAL at 20:24

## 2024-06-19 RX ADMIN — ATORVASTATIN CALCIUM 80 MG: 80 TABLET, FILM COATED ORAL at 20:24

## 2024-06-19 RX ADMIN — AMLODIPINE BESYLATE 10 MG: 10 TABLET ORAL at 18:08

## 2024-06-19 RX ADMIN — RIVAROXABAN 20 MG: 20 TABLET, FILM COATED ORAL at 18:06

## 2024-06-19 RX ADMIN — ATENOLOL 12.5 MG: 25 TABLET ORAL at 20:24

## 2024-06-19 RX ADMIN — Medication 10 ML: at 20:26

## 2024-06-19 RX ADMIN — LEVETIRACETAM 500 MG: 500 TABLET, FILM COATED ORAL at 20:24

## 2024-06-19 RX ADMIN — SODIUM CHLORIDE 1000 ML: 9 INJECTION, SOLUTION INTRAVENOUS at 12:41

## 2024-06-19 RX ADMIN — LORAZEPAM 1 MG: 2 INJECTION INTRAMUSCULAR; INTRAVENOUS at 13:02

## 2024-06-19 RX ADMIN — Medication 1000 UNITS: at 18:06

## 2024-06-19 RX ADMIN — HYDROCHLOROTHIAZIDE 25 MG: 25 TABLET ORAL at 18:06

## 2024-06-19 RX ADMIN — LOSARTAN POTASSIUM 100 MG: 100 TABLET, FILM COATED ORAL at 18:06

## 2024-06-19 ASSESSMENT — PAIN SCALES - GENERAL: PAINLEVEL_OUTOF10: 0

## 2024-06-19 ASSESSMENT — HEART SCORE: ECG: NON-SPECIFC REPOLARIZATION DISTURBANCE/LBTB/PM

## 2024-06-19 ASSESSMENT — PAIN - FUNCTIONAL ASSESSMENT: PAIN_FUNCTIONAL_ASSESSMENT: 0-10

## 2024-06-19 NOTE — H&P
V2.0  History and Physical      Name:  Kourtney Swann /Age/Sex: 1945  (79 y.o. female)   MRN & CSN:  [id number] & [id number] Encounter Date/Time: 2024 4:52 PM EDT   Location:  [address] [place] [address] PCP: [facility] Hospital Day: 1    Assessment and Plan:   Patient is an 79-year-old female with a past medical history of atrial fibrillation, hypertension, diabetes mellitus, meningioma, seizure disorder who presents to the emergency department with complaints of palpitations, lightheadedness    Hospital Problems             Last Modified POA    * (Principal) Palpitations 2024 Yes       Plan:  Palpitations  History of atrial fibrillation: Patient presents with complaints of intermittent palpitations, lightheadedness, shortness of breath.  EKG shows sinus rhythm with sinus arrhythmia, bifascicular block.  Concern is for poorly controlled arrhythmia.  Will consult cardiology, follow-up recommendations.  Obtain echocardiogram, monitor on telemetry.  Continue home atenolol 12.5 mg nightly, flecainide 50 mg twice daily, Xarelto 20 mg daily.  Check orthostatic vital signs.  Hypertension: Continue atenolol, losartan/hydrochlorothiazide, amlodipine  Diabetes mellitus type 2: Low sliding scale insulin 3 times daily ACHS, check A1c  Meningioma  Seizure disorder: Continue home Keppra, meningioma stable on most recent imaging, to follow-up with neurology as outpatient next week  Lactic acidosis: Lactic acidosis on presentation, improved on repeat following saline bolus    Disposition:   Current Living situation: Home  Expected Disposition: Home  Estimated D/C: 2 days    Diet Cardiac   DVT Prophylaxis [] Lovenox, []  Heparin, [] SCDs, [] Ambulation,  [] Eliquis, [x] Xarelto, [] Coumadin   Code Status Full code   Surrogate Decision Maker/ POA Neighbor     Personally reviewed Lab Studies and Imaging     Discussed management of the case with patient, ED provider    EKG interpreted personally shows sinus rhythm with sinus

## 2024-06-19 NOTE — PROGRESS NOTES
4 Eyes Skin Assessment     NAME:  Kourtney Swann  YOB: 1945  MEDICAL RECORD NUMBER:  [id number]    The patient is being assessed for  Admission    I agree that at least one RN has performed a thorough Head to Toe Skin Assessment on the patient. ALL assessment sites listed below have been assessed.      Areas assessed by both nurses:    Head, Face, Ears, Shoulders, Back, Chest, Arms, Elbows, Hands, Sacrum. Buttock, Coccyx, Ischium, Legs. Feet and Heels, and Under Medical Devices         Does the Patient have a Wound? No noted wound(s)       Yogesh Prevention initiated by RN: No  Wound Care Orders initiated by RN: No    Pressure Injury (Stage 3,4, Unstageable, DTI, NWPT, and Complex wounds) if present, place Wound referral order by RN under : No    New Ostomies, if present place, Ostomy referral order under : No     Nurse 1 eSignature: Electronically signed by Mel Salinas RN on 6/19/24 at 6:23 PM EDT    **SHARE this note so that the co-signing nurse can place an eSignature**    Nurse 2 eSignature: Electronically signed by Soy Abarca RN on 6/19/24 at 6:41 PM EDT

## 2024-06-19 NOTE — ED PROVIDER NOTES
ED Attending Attestation Note    This patient was seen by the advanced practice provider.     I personally saw the patient. Management plan and care decisions have been discussed with me and I made/approved the management plan and take responsibility for patient management.     Briefly, 79 y.o. female presents with chest pain, SOB, lightheadedness. Hx AF on xarelto, hx seizure on keppra, brain tumor (meningioma) here with chest pain, SOB, lightheadedness.     Focused exam:   Gen: 79 y.o. female, NAD, nontoxic appearing, anxious  HEENT: NCAT. MMM, PERRL. EOMI.   CV: RRR w/o MRG  Vascular: intact and symmetric radial and DP pulses bilaterally  Lungs: CTAB. No incr WOB.   Abdomen: Soft, nontender, nondistended. No rebound/guarding.   Neuro: awake and alert, speech clear w/o aphasia; intact and symmetric strength and sensation in all 4 extremities, CN 2-12 intact bilaterally    MDM:   This is an 79-year-old woman presenting with chest pain, shortness of breath and lightheadedness.  Upon presentation, she was somewhat anxious appearing but saturating appropriately on room air and nontoxic.  EKG shows questionable biphasic T wave in lead III but is otherwise unchanged compared to prior.  Initial high-sensitivity troponin is 17, repeat is 17    Heart score is 7.  She has not had a recent evaluation of CAD    Nursing notes, vital signs reviewed.     Records reviewed:   Seen by cardiology for syncope, notes that she is on flecainide    I independently interpreted the following studies   XR CHEST PORTABLE   Final Result   No acute process.           Labs Reviewed   TROPONIN - Abnormal; Notable for the following components:       Result Value    Troponin, High Sensitivity 17 (*)     All other components within normal limits   COMPREHENSIVE METABOLIC PANEL W/ REFLEX TO MG FOR LOW K - Abnormal; Notable for the following components:    Glucose 149 (*)     BUN 22 (*)     ALT 43 (*)     AST 48 (*)     All other components within 
EXTRACTION Left 8/8/2022    PHACOEMULSIFICATION WITH INTRAOCULAR LENS IMPLANT - LEFT EYE performed by Jose Luis Platt MD at [facility]    INTRACAPSULAR CATARACT EXTRACTION Right 8/22/2022    PHACOEMULSIFICATION WITH INTRAOCULAR LENS IMPLANT - RIGHT EYE performed by Jose Luis Platt MD at [facility]    TONSILLECTOMY         CURRENTMEDICATIONS       Current Discharge Medication List        CONTINUE these medications which have NOT CHANGED    Details   levETIRAcetam (KEPPRA) 500 MG tablet Take 1 tablet by mouth 2 times daily  Qty: 60 tablet, Refills: 1      atenolol (TENORMIN) 25 MG tablet Take 0.5 tablets by mouth at bedtime      atorvastatin (LIPITOR) 80 MG tablet Take 1 tablet by mouth daily      flecainide (TAMBOCOR) 50 MG tablet Take 1 tablet by mouth 2 times daily      losartan-hydroCHLOROthiazide (HYZAAR) 100-25 MG per tablet Take 1 tablet by mouth daily      rivaroxaban (XARELTO) 20 MG TABS tablet Take 1 tablet by mouth Daily with supper      amLODIPine (NORVASC) 10 MG tablet Take 1 tablet by mouth daily      metFORMIN (GLUCOPHAGE-XR) 500 MG extended release tablet Take 3 tablets by mouth daily (with breakfast)      Multiple Vitamins-Minerals (THERAPEUTIC MULTIVITAMIN-MINERALS) tablet Take 1 tablet by mouth daily      Misc Natural Products (GLUCOSAMINE CHOND MSM FORMULA) TABS Take 1 tablet by mouth daily      vitamin D (CHOLECALCIFEROL) 25 MCG (1000 UT) TABS tablet Take 1 tablet by mouth daily             ALLERGIES     Patient has no known allergies.    FAMILYHISTORY     History reviewed. No pertinent family history.     SOCIAL HISTORY       Social History     Tobacco Use    Smoking status: Never    Smokeless tobacco: Never   Vaping Use    Vaping Use: Never used   Substance Use Topics    Alcohol use: Never    Drug use: Never       SCREENINGS          [place] Coma Scale  Eye Opening: Spontaneous  Best Verbal Response: Oriented  Best Motor Response: Obeys commands  [place] Coma Scale Score:

## 2024-06-19 NOTE — PROGRESS NOTES
Patient arrived by stretcher, ambulated independently from stretcher to bathroom. Patient is alert oriented x4.denies SOB at this time. IV in place and patent. Skin assessment performed. Fall prevention remains in place  for precaution until evaluated by PT.Call light within reach, low bed position. Patient has been oriented to room and surroundings. Ongoing monitoring.

## 2024-06-19 NOTE — PROGRESS NOTES
Medication Reconciliation    List of medications patient is currently taking is complete.     Source of information: 1. Conversation with patient at bedside                                      2. EPIC records         Notes regarding home medications:   1. Patient reports taking no medications this morning PTA  2. Atenolol dose was reduced to 12.5mg by cardiologist yesterday. Last dose of atenolol was 50mg on 6/17.  3. Patient is anticoagulated on Xarelto for afib. Reports she regularly takes Xarelto with dinner.      Slim Man [facility]   6/19/2024  2:13 PM

## 2024-06-19 NOTE — ED NOTES
Patient began to have another episode. ER ISHAN Padilla was called to the bedside. Patient stated that she feels like she can't catch her breath. Patient was placed on 2 L via NC for comfort. Patient began to calm down with vitals stable.

## 2024-06-19 NOTE — ED TRIAGE NOTES
Patient was brought in by EMS from home due to shortness of breath and palpitations while sitting in her recliner. Patient states that she feels like it was an Afib episode. Vitals signs stable per EMS. Patient is on xarelto and BP medication. PCP changed her BP dose to 12 from 25 mg but patient hasn't picked it up yet. History of brain tumor and seizures.

## 2024-06-20 LAB
ALBUMIN SERPL-MCNC: 3.7 G/DL (ref 3.4–5)
ALBUMIN/GLOB SERPL: 1.2 {RATIO} (ref 1.1–2.2)
ALP SERPL-CCNC: 109 U/L (ref 40–129)
ALT SERPL-CCNC: 36 U/L (ref 10–40)
ANION GAP SERPL CALCULATED.3IONS-SCNC: 10 MMOL/L (ref 3–16)
AST SERPL-CCNC: 40 U/L (ref 15–37)
BASOPHILS # BLD: 0.1 K/UL (ref 0–0.2)
BASOPHILS NFR BLD: 1 %
BILIRUB SERPL-MCNC: 0.5 MG/DL (ref 0–1)
BUN SERPL-MCNC: 21 MG/DL (ref 7–20)
CALCIUM SERPL-MCNC: 9.7 MG/DL (ref 8.3–10.6)
CHLORIDE SERPL-SCNC: 104 MMOL/L (ref 99–110)
CO2 SERPL-SCNC: 25 MMOL/L (ref 21–32)
CREAT SERPL-MCNC: 0.8 MG/DL (ref 0.6–1.2)
DEPRECATED RDW RBC AUTO: 14.2 % (ref 12.4–15.4)
EOSINOPHIL # BLD: 0.3 K/UL (ref 0–0.6)
EOSINOPHIL NFR BLD: 3 %
EST. AVERAGE GLUCOSE BLD GHB EST-MCNC: 134.1 MG/DL
GFR SERPLBLD CREATININE-BSD FMLA CKD-EPI: 74 ML/MIN/{1.73_M2}
GLUCOSE BLD-MCNC: 112 MG/DL (ref 70–99)
GLUCOSE BLD-MCNC: 147 MG/DL (ref 70–99)
GLUCOSE BLD-MCNC: 152 MG/DL (ref 70–99)
GLUCOSE BLD-MCNC: 219 MG/DL (ref 70–99)
GLUCOSE SERPL-MCNC: 85 MG/DL (ref 70–99)
HBA1C MFR BLD: 6.3 %
HCT VFR BLD AUTO: 35.4 % (ref 36–48)
HGB BLD-MCNC: 12.3 G/DL (ref 12–16)
LYMPHOCYTES # BLD: 2.2 K/UL (ref 1–5.1)
LYMPHOCYTES NFR BLD: 23.7 %
MAGNESIUM SERPL-MCNC: 1.9 MG/DL (ref 1.8–2.4)
MCH RBC QN AUTO: 29 PG (ref 26–34)
MCHC RBC AUTO-ENTMCNC: 34.9 G/DL (ref 31–36)
MCV RBC AUTO: 83.1 FL (ref 80–100)
MONOCYTES # BLD: 1.1 K/UL (ref 0–1.3)
MONOCYTES NFR BLD: 11.9 %
NEUTROPHILS # BLD: 5.6 K/UL (ref 1.7–7.7)
NEUTROPHILS NFR BLD: 60.4 %
PERFORMED ON: ABNORMAL
PHOSPHATE SERPL-MCNC: 4.4 MG/DL (ref 2.5–4.9)
PLATELET # BLD AUTO: 286 K/UL (ref 135–450)
PMV BLD AUTO: 8.4 FL (ref 5–10.5)
POTASSIUM SERPL-SCNC: 3.6 MMOL/L (ref 3.5–5.1)
PROT SERPL-MCNC: 6.9 G/DL (ref 6.4–8.2)
RBC # BLD AUTO: 4.26 M/UL (ref 4–5.2)
SODIUM SERPL-SCNC: 139 MMOL/L (ref 136–145)
TSH SERPL DL<=0.005 MIU/L-ACNC: 2.35 UIU/ML (ref 0.27–4.2)
WBC # BLD AUTO: 9.2 K/UL (ref 4–11)

## 2024-06-20 PROCEDURE — 97530 THERAPEUTIC ACTIVITIES: CPT

## 2024-06-20 PROCEDURE — 2580000003 HC RX 258: Performed by: STUDENT IN AN ORGANIZED HEALTH CARE EDUCATION/TRAINING PROGRAM

## 2024-06-20 PROCEDURE — G0378 HOSPITAL OBSERVATION PER HR: HCPCS

## 2024-06-20 PROCEDURE — 85025 COMPLETE CBC W/AUTO DIFF WBC: CPT

## 2024-06-20 PROCEDURE — 99222 1ST HOSP IP/OBS MODERATE 55: CPT | Performed by: INTERNAL MEDICINE

## 2024-06-20 PROCEDURE — 97166 OT EVAL MOD COMPLEX 45 MIN: CPT

## 2024-06-20 PROCEDURE — 6370000000 HC RX 637 (ALT 250 FOR IP): Performed by: STUDENT IN AN ORGANIZED HEALTH CARE EDUCATION/TRAINING PROGRAM

## 2024-06-20 PROCEDURE — 80053 COMPREHEN METABOLIC PANEL: CPT

## 2024-06-20 PROCEDURE — 36415 COLL VENOUS BLD VENIPUNCTURE: CPT

## 2024-06-20 PROCEDURE — 83735 ASSAY OF MAGNESIUM: CPT

## 2024-06-20 PROCEDURE — 94760 N-INVAS EAR/PLS OXIMETRY 1: CPT

## 2024-06-20 PROCEDURE — 97162 PT EVAL MOD COMPLEX 30 MIN: CPT

## 2024-06-20 PROCEDURE — 84100 ASSAY OF PHOSPHORUS: CPT

## 2024-06-20 PROCEDURE — 84443 ASSAY THYROID STIM HORMONE: CPT

## 2024-06-20 RX ADMIN — LOSARTAN POTASSIUM 100 MG: 100 TABLET, FILM COATED ORAL at 07:53

## 2024-06-20 RX ADMIN — ATENOLOL 12.5 MG: 25 TABLET ORAL at 20:09

## 2024-06-20 RX ADMIN — LEVETIRACETAM 500 MG: 500 TABLET, FILM COATED ORAL at 20:09

## 2024-06-20 RX ADMIN — Medication 10 ML: at 07:54

## 2024-06-20 RX ADMIN — FLECAINIDE ACETATE 50 MG: 100 TABLET ORAL at 07:53

## 2024-06-20 RX ADMIN — Medication 1000 UNITS: at 07:53

## 2024-06-20 RX ADMIN — FLECAINIDE ACETATE 50 MG: 100 TABLET ORAL at 20:09

## 2024-06-20 RX ADMIN — LEVETIRACETAM 500 MG: 500 TABLET, FILM COATED ORAL at 08:00

## 2024-06-20 RX ADMIN — Medication 10 ML: at 20:10

## 2024-06-20 RX ADMIN — AMLODIPINE BESYLATE 10 MG: 10 TABLET ORAL at 07:53

## 2024-06-20 RX ADMIN — HYDROCHLOROTHIAZIDE 25 MG: 25 TABLET ORAL at 07:54

## 2024-06-20 RX ADMIN — INSULIN LISPRO 1 UNITS: 100 INJECTION, SOLUTION INTRAVENOUS; SUBCUTANEOUS at 17:03

## 2024-06-20 RX ADMIN — ATORVASTATIN CALCIUM 80 MG: 80 TABLET, FILM COATED ORAL at 20:09

## 2024-06-20 RX ADMIN — RIVAROXABAN 20 MG: 20 TABLET, FILM COATED ORAL at 17:03

## 2024-06-20 NOTE — CARE COORDINATION
Discharge Planning:      (CM) reviewed the patient's chart to assess needs. Patient's Readmission Risk Score is  OBS . Patient's medical insurance is  Payor: [facility] MEDICARE / Plan: ANTHHENRY MEDIBLUE ESSENTIAL/PLUS / Product Type: *No Product type* / .  Patient's PCP is Wes Woodward .  No needs anticipated, at this time. CM team to follow. Staff to inform CM if additional discharge needs arise.    Pts preferred pharmacy is   [id number] - [place], [place] - [address] - P [phone] - F [phone]  [address]  [facility] [address]  Phone: [phone] Fax: [phone]    Electronically signed by May Perdue RN on 6/20/2024 at 2:17 PM

## 2024-06-20 NOTE — PROGRESS NOTES
Occupational Therapy  Facility/Department: [address] MED SURG  Occupational Therapy Initial Assessment    Name: Kourtney Swann  : 1945  MRN: [id number]  Date of Service: 2024    Discharge Recommendations:  Home with assist PRN     Kourtney Swann scored a 21/24 on the AM-PAC ADL Inpatient form.  At this time, no further OT is recommended upon discharge due to pt near baseline function.  Recommend patient returns to prior setting with prior services.         Patient Diagnosis(es): The primary encounter diagnosis was Chest pain, unspecified type. Diagnoses of Shortness of breath, Palpitations, and Paroxysmal atrial fibrillation (HCC) were also pertinent to this visit.  Past Medical History:  has a past medical history of Atrial fibrillation (HCC), Brain tumor (benign) (HCC), CAD (coronary artery disease), COPD (chronic obstructive pulmonary disease) (HCC), Diabetes mellitus (HCC), Hyperlipidemia, Hypertension, Meningioma (HCC), RBBB (right bundle branch block), and Seizure (HCC).  Past Surgical History:  has a past surgical history that includes Hysterectomy; Cardioversion; Tonsillectomy; Intracapsular cataract extraction (Left, 2022); and Intracapsular cataract extraction (Right, 2022).    Treatment Diagnosis: Decreased: ADLs, functional transfers/mobility      Assessment   Performance deficits / Impairments: Decreased functional mobility ;Decreased ADL status  Assessment: Pt is a 80 yo F w/ PMHx atrial fibrillation, DM, seizure disorder, who presented to the ED with c/o palpitations, lightheadedness. Prior to admit, pt lives at home alone where she is typically independent in self-care, functional mobility, and homemaking. She is just below baseline at this time, completing functional transfers and mobility SBA, though min unsteady, and anticipate no more than SBA for full ADL. Will continue to follow while hospitalized. Anticipate pt will be safe to return home with assist PRN when medically

## 2024-06-20 NOTE — PROGRESS NOTES
Physical Therapy  Facility/Department: [address] MED SURG  Physical Therapy Initial Assessment    Name: Kourtney Swann  : 1945  MRN: [id number]  Date of Service: 2024    Discharge Recommendations:  Home with assist PRN, Continue to assess pending progress          Patient Diagnosis(es): The primary encounter diagnosis was Chest pain, unspecified type. Diagnoses of Shortness of breath, Palpitations, and Paroxysmal atrial fibrillation (HCC) were also pertinent to this visit.  Past Medical History:  has a past medical history of Atrial fibrillation (HCC), Brain tumor (benign) (HCC), CAD (coronary artery disease), COPD (chronic obstructive pulmonary disease) (HCC), Diabetes mellitus (HCC), Hyperlipidemia, Hypertension, Meningioma (HCC), RBBB (right bundle branch block), and Seizure (HCC).  Past Surgical History:  has a past surgical history that includes Hysterectomy; Cardioversion; Tonsillectomy; Intracapsular cataract extraction (Left, 2022); and Intracapsular cataract extraction (Right, 2022).    Assessment   Body Structures, Functions, Activity Limitations Requiring Skilled Therapeutic Intervention: Decreased functional mobility ;Decreased strength;Decreased endurance;Decreased balance  Assessment: Patient is an 79-year-old female with a past medical history of atrial fibrillation, hypertension, diabetes mellitus, meningioma, seizure disorder who presents to the emergency department on 24 with complaints of palpitations, lightheadedness. Prior to admission, pt living alone in home setting, independent with ADLs and ambulation without device. Pt currently functioning slightly below baseline. Orthostatics negative. Anticipate return home with PRN assist.  Treatment Diagnosis: impaired mobility  Therapy Prognosis: Good  Decision Making: Medium Complexity  History: see above  Exam: see below  Clinical Presentation: evolving  Requires PT Follow-Up: Yes  Activity Tolerance  Activity Tolerance:

## 2024-06-20 NOTE — PLAN OF CARE
Problem: Discharge Planning  Goal: Discharge to home or other facility with appropriate resources  6/20/2024 0911 by Dawna Le RN  Outcome: Progressing  6/19/2024 2116 by Itz Schilling RN  Outcome: Progressing  6/19/2024 1914 by Mel Salinas RN  Outcome: Progressing     Problem: Pain  Goal: Verbalizes/displays adequate comfort level or baseline comfort level  6/20/2024 0911 by Dawna Le RN  Outcome: Progressing  6/19/2024 2116 by Itz Schilling RN  Outcome: Progressing  6/19/2024 1914 by Mel Salinas RN  Outcome: Progressing     Problem: ABCDS Injury Assessment  Goal: Absence of physical injury  6/20/2024 0911 by Dawna Le RN  Outcome: Progressing  6/19/2024 2116 by Itz Schilling RN  Outcome: Progressing  6/19/2024 1914 by Mel Salinas RN  Outcome: Progressing     Problem: Chronic Conditions and Co-morbidities  Goal: Patient's chronic conditions and co-morbidity symptoms are monitored and maintained or improved  Outcome: Progressing     Problem: Safety - Adult  Goal: Free from fall injury  Outcome: Progressing

## 2024-06-20 NOTE — PLAN OF CARE
Problem: Discharge Planning  Goal: Discharge to home or other facility with appropriate resources  6/19/2024 2116 by Itz Schilling RN  Outcome: Progressing  6/19/2024 1914 by Mel Salinas RN  Outcome: Progressing     Problem: Pain  Goal: Verbalizes/displays adequate comfort level or baseline comfort level  6/19/2024 2116 by Itz Schilling RN  Outcome: Progressing  6/19/2024 1914 by Mel Salinas RN  Outcome: Progressing     Problem: ABCDS Injury Assessment  Goal: Absence of physical injury  6/19/2024 2116 by Itz Schilling RN  Outcome: Progressing  6/19/2024 1914 by Mel Salinas RN  Outcome: Progressing

## 2024-06-21 VITALS
OXYGEN SATURATION: 96 % | HEART RATE: 59 BPM | RESPIRATION RATE: 20 BRPM | DIASTOLIC BLOOD PRESSURE: 60 MMHG | WEIGHT: 169.09 LBS | TEMPERATURE: 97.5 F | HEIGHT: 61 IN | SYSTOLIC BLOOD PRESSURE: 116 MMHG | BODY MASS INDEX: 31.93 KG/M2

## 2024-06-21 LAB
GLUCOSE BLD-MCNC: 120 MG/DL (ref 70–99)
GLUCOSE BLD-MCNC: 125 MG/DL (ref 70–99)
PERFORMED ON: ABNORMAL
PERFORMED ON: ABNORMAL

## 2024-06-21 PROCEDURE — 94760 N-INVAS EAR/PLS OXIMETRY 1: CPT

## 2024-06-21 PROCEDURE — 6370000000 HC RX 637 (ALT 250 FOR IP): Performed by: STUDENT IN AN ORGANIZED HEALTH CARE EDUCATION/TRAINING PROGRAM

## 2024-06-21 PROCEDURE — G0378 HOSPITAL OBSERVATION PER HR: HCPCS

## 2024-06-21 PROCEDURE — 2580000003 HC RX 258: Performed by: STUDENT IN AN ORGANIZED HEALTH CARE EDUCATION/TRAINING PROGRAM

## 2024-06-21 RX ADMIN — LOSARTAN POTASSIUM 100 MG: 100 TABLET, FILM COATED ORAL at 08:13

## 2024-06-21 RX ADMIN — LEVETIRACETAM 500 MG: 500 TABLET, FILM COATED ORAL at 08:13

## 2024-06-21 RX ADMIN — Medication 1000 UNITS: at 08:13

## 2024-06-21 RX ADMIN — HYDROCHLOROTHIAZIDE 25 MG: 25 TABLET ORAL at 08:12

## 2024-06-21 RX ADMIN — Medication 10 ML: at 08:13

## 2024-06-21 RX ADMIN — FLECAINIDE ACETATE 50 MG: 100 TABLET ORAL at 08:12

## 2024-06-21 RX ADMIN — AMLODIPINE BESYLATE 10 MG: 10 TABLET ORAL at 08:13

## 2024-06-21 NOTE — PROGRESS NOTES
V2.0    [facility] [place]      Name:  Kourtney Swann /Age/Sex: 1945  (79 y.o. female)   MRN & CSN:  [id number] & [id number] Encounter Date/Time: 2024 10:37 PM EDT   Location:  [id number]/4127-01 PCP: Wes Woodward     Attending:Christopher Ellsworth MD       Hospital Day: 2    Assessment and Recommendations         Palpitations  History of atrial fibrillation: Patient presents with complaints of intermittent palpitations, lightheadedness, shortness of breath.  EKG shows sinus rhythm with sinus arrhythmia, bifascicular block.  Concern is for poorly controlled arrhythmia.echo pending. Cards consulted.   Continue home atenolol 12.5 mg nightly, flecainide 50 mg twice daily, Xarelto 20 mg daily.  Check orthostatic vital signs.  Hypertension: Continue atenolol, losartan/hydrochlorothiazide, amlodipine  Diabetes mellitus type 2: Low sliding scale insulin 3 times daily ACHS, check A1c  Meningioma  Seizure disorder: Continue home Keppra, meningioma stable on most recent imaging, to follow-up with neurology as outpatient next week  Lactic acidosis: Lactic acidosis on presentation, improved on repeat following saline bolus      DVT Prophylaxis: Xarelto  Code Status: Total Support.   Barrier to DC: echo and card eval pending.           Subjective:     Patient was seen and examined today. No acute events overnight.     Review of Systems:      Pertinent positives and negatives discussed in HPI    Objective:     Intake/Output Summary (Last 24 hours) at 2024 2237  Last data filed at 2024 1433  Gross per 24 hour   Intake 960 ml   Output --   Net 960 ml      Vitals:   Vitals:    24 1116 24 1540 24 1621 24 1940   BP: 97/60 112/62  124/73   Pulse: 62 73 73 73   Resp:    Temp: 98 °F (36.7 °C) 97.3 °F (36.3 °C)  98.2 °F (36.8 °C)   TempSrc: Oral Oral  Oral   SpO2: 97% 95% 95% 95%   Weight:       Height:             Physical Exam:      General: awake, alert, in NAD  Eyes: EOMI  ENT: neck

## 2024-06-21 NOTE — PROGRESS NOTES
AVS reviewed with the patient. IV taken out without complications. Patient will follow up with cardiologist outpt. for halter monitor. Patient verbalized understanding. Patient wheeled out to family member for discharge home.

## 2024-06-21 NOTE — CARE COORDINATION
6/21 Discharge to home with family. AVS reviewed/no discharge needs identified.Electronically signed by May Perdue RN on 6/21/2024 at 1:57 PM

## 2024-06-21 NOTE — PLAN OF CARE
Problem: Discharge Planning  Goal: Discharge to home or other facility with appropriate resources  6/21/2024 0948 by Dawna Le RN  Outcome: Progressing  6/20/2024 2219 by Itz Schilling RN  Outcome: Progressing     Problem: Pain  Goal: Verbalizes/displays adequate comfort level or baseline comfort level  6/21/2024 0948 by Dawna Le RN  Outcome: Progressing  6/20/2024 2219 by Itz Schilling RN  Outcome: Progressing     Problem: ABCDS Injury Assessment  Goal: Absence of physical injury  6/21/2024 0948 by Dawna Le RN  Outcome: Progressing  6/20/2024 2219 by Itz Schilling RN  Outcome: Progressing     Problem: Chronic Conditions and Co-morbidities  Goal: Patient's chronic conditions and co-morbidity symptoms are monitored and maintained or improved  6/21/2024 0948 by Dawna Le RN  Outcome: Progressing  6/20/2024 2219 by Itz Schilling RN  Outcome: Progressing     Problem: Safety - Adult  Goal: Free from fall injury  6/21/2024 0948 by Dawna Le RN  Outcome: Progressing  6/20/2024 2219 by Itz Schilling RN  Outcome: Progressing

## 2024-06-21 NOTE — DISCHARGE SUMMARY
V2.0  Discharge Summary    Name:  Kourtney Swann /Age/Sex: 1945 (79 y.o. female)   Admit Date: 2024  Discharge Date: 24    MRN & CSN:  [id number] & [id number] Encounter Date and Time 24 10:54 AM EDT    Attending:  Christopher Ellsworth MD Discharging Provider: Christopher Ellsworth MD       Hospital Course:     Patient is a 79-year-old female past medical history of diabetes, seizure disorder, hypertension, A-fib, anticoagulated with Xarelto, also on flecainide who was admitted for palpitation.  Patient was monitored closely on telemetry without any further episodes of palpitations/A-fib lightest.  She was seen by cardiology/EP during my evaluation and they recommended that patient should follow-up with her own cardiologist at [facility].  Since patient is now medically cleared for discharge by EP and since she has benefited the most of hospitalization, she will be discharged with home stable condition.  She was advised to follow-up with her PCP and her cardiologist at [facility] for transition of care.      Discharge Instruction:     Primary care physician: Wes Woodward within 2 weeks  Diet: cardiac diet   Activity: activity as tolerated  Disposition: Discharged to:   [x]Home, []C, []SNF, []Acute Rehab, []Hospice   Condition on discharge: Stable    Discharge Medications:        Medication List        CONTINUE taking these medications      amLODIPine 10 MG tablet  Commonly known as: NORVASC     atenolol 25 MG tablet  Commonly known as: TENORMIN     atorvastatin 80 MG tablet  Commonly known as: LIPITOR     flecainide 50 MG tablet  Commonly known as: TAMBOCOR     Glucosamine Chond MSM Formula Tabs     levETIRAcetam 500 MG tablet  Commonly known as: KEPPRA  Take 1 tablet by mouth 2 times daily     losartan-hydroCHLOROthiazide 100-25 MG per tablet  Commonly known as: HYZAAR     metFORMIN 500 MG extended release tablet  Commonly known as: GLUCOPHAGE-XR     rivaroxaban 20 MG Tabs tablet  Commonly known as:

## 2024-06-21 NOTE — PLAN OF CARE
Problem: Discharge Planning  Goal: Discharge to home or other facility with appropriate resources  6/21/2024 1110 by Dawna Le RN  Outcome: Adequate for Discharge  6/21/2024 0948 by Dawna Le RN  Outcome: Progressing  6/20/2024 2219 by Itz Schilling RN  Outcome: Progressing     Problem: Pain  Goal: Verbalizes/displays adequate comfort level or baseline comfort level  6/21/2024 1110 by Dawna Le RN  Outcome: Adequate for Discharge  6/21/2024 0948 by Dawna Le RN  Outcome: Progressing  6/20/2024 2219 by Itz Schilling RN  Outcome: Progressing     Problem: ABCDS Injury Assessment  Goal: Absence of physical injury  6/21/2024 1110 by Dawna Le RN  Outcome: Adequate for Discharge  6/21/2024 0948 by Dawna Le RN  Outcome: Progressing  6/20/2024 2219 by Itz Schilling RN  Outcome: Progressing     Problem: Chronic Conditions and Co-morbidities  Goal: Patient's chronic conditions and co-morbidity symptoms are monitored and maintained or improved  6/21/2024 1110 by Dawna Le RN  Outcome: Adequate for Discharge  6/21/2024 0948 by Dawna Le RN  Outcome: Progressing  6/20/2024 2219 by Itz Schilling RN  Outcome: Progressing     Problem: Safety - Adult  Goal: Free from fall injury  6/21/2024 1110 by Dawna Le RN  Outcome: Adequate for Discharge  6/21/2024 0948 by Dawna Le RN  Outcome: Progressing  6/20/2024 2219 by Itz Schilling RN  Outcome: Progressing

## 2024-06-21 NOTE — PLAN OF CARE
Problem: Discharge Planning  Goal: Discharge to home or other facility with appropriate resources  6/20/2024 2219 by Itz Schilling RN  Outcome: Progressing  6/20/2024 0911 by Dawna Le RN  Outcome: Progressing     Problem: Pain  Goal: Verbalizes/displays adequate comfort level or baseline comfort level  6/20/2024 2219 by Itz Schilling RN  Outcome: Progressing  6/20/2024 0911 by Dawna Le RN  Outcome: Progressing     Problem: ABCDS Injury Assessment  Goal: Absence of physical injury  6/20/2024 2219 by Itz Schilling RN  Outcome: Progressing  6/20/2024 0911 by Dawna Le RN  Outcome: Progressing     Problem: Chronic Conditions and Co-morbidities  Goal: Patient's chronic conditions and co-morbidity symptoms are monitored and maintained or improved  6/20/2024 2219 by Itz Schilling RN  Outcome: Progressing  6/20/2024 0911 by Dawna Le RN  Outcome: Progressing     Problem: Safety - Adult  Goal: Free from fall injury  6/20/2024 2219 by Itz Schilling RN  Outcome: Progressing  6/20/2024 0911 by Dawna Le RN  Outcome: Progressing

## 2024-07-03 ENCOUNTER — HOSPITAL ENCOUNTER (EMERGENCY)
Age: 79
Discharge: HOME | End: 2024-07-03
Attending: EMERGENCY MEDICINE
Payer: MEDICARE

## 2024-07-03 ENCOUNTER — APPOINTMENT (OUTPATIENT)
Dept: CT IMAGING | Age: 79
End: 2024-07-03
Attending: EMERGENCY MEDICINE
Payer: MEDICARE

## 2024-07-03 ENCOUNTER — APPOINTMENT (OUTPATIENT)
Dept: GENERAL RADIOLOGY | Age: 79
End: 2024-07-03
Payer: MEDICARE

## 2024-07-03 VITALS
BODY MASS INDEX: 32.12 KG/M2 | DIASTOLIC BLOOD PRESSURE: 65 MMHG | WEIGHT: 169.97 LBS | SYSTOLIC BLOOD PRESSURE: 125 MMHG | RESPIRATION RATE: 28 BRPM | OXYGEN SATURATION: 100 % | HEART RATE: 80 BPM | TEMPERATURE: 97.7 F

## 2024-07-03 DIAGNOSIS — F41.1 ANXIETY STATE: ICD-10-CM

## 2024-07-03 DIAGNOSIS — R06.00 DYSPNEA, UNSPECIFIED TYPE: Primary | ICD-10-CM

## 2024-07-03 LAB
ALBUMIN SERPL-MCNC: 4.3 G/DL (ref 3.4–5)
ALBUMIN/GLOB SERPL: 1.1 {RATIO} (ref 1.1–2.2)
ALP SERPL-CCNC: 126 U/L (ref 40–129)
ALT SERPL-CCNC: 32 U/L (ref 10–40)
ANION GAP SERPL CALCULATED.3IONS-SCNC: 17 MMOL/L (ref 3–16)
AST SERPL-CCNC: 42 U/L (ref 15–37)
BASE EXCESS BLDV CALC-SCNC: 3.5 MMOL/L
BASE EXCESS BLDV CALC-SCNC: 4.7 MMOL/L
BASOPHILS # BLD: 0.1 K/UL (ref 0–0.2)
BASOPHILS NFR BLD: 1 %
BILIRUB SERPL-MCNC: 0.5 MG/DL (ref 0–1)
BUN SERPL-MCNC: 19 MG/DL (ref 7–20)
CALCIUM SERPL-MCNC: 10.6 MG/DL (ref 8.3–10.6)
CHLORIDE SERPL-SCNC: 102 MMOL/L (ref 99–110)
CO2 BLDV-SCNC: 25 MMOL/L
CO2 BLDV-SCNC: 26 MMOL/L
CO2 SERPL-SCNC: 20 MMOL/L (ref 21–32)
COHGB MFR BLDV: 1.1 %
COHGB MFR BLDV: 1.7 %
CREAT SERPL-MCNC: 0.9 MG/DL (ref 0.6–1.2)
DEPRECATED RDW RBC AUTO: 14.5 % (ref 12.4–15.4)
EKG ATRIAL RATE: 91 BPM
EKG DIAGNOSIS: NORMAL
EKG P AXIS: 40 DEGREES
EKG P-R INTERVAL: 120 MS
EKG Q-T INTERVAL: 422 MS
EKG QRS DURATION: 148 MS
EKG QTC CALCULATION (BAZETT): 519 MS
EKG R AXIS: -70 DEGREES
EKG T AXIS: 42 DEGREES
EKG VENTRICULAR RATE: 91 BPM
EOSINOPHIL # BLD: 0.3 K/UL (ref 0–0.6)
EOSINOPHIL NFR BLD: 2.5 %
GFR SERPLBLD CREATININE-BSD FMLA CKD-EPI: 64 ML/MIN/{1.73_M2}
GLUCOSE SERPL-MCNC: 113 MG/DL (ref 70–99)
HCO3 BLDV-SCNC: 24 MMOL/L (ref 23–29)
HCO3 BLDV-SCNC: 25 MMOL/L (ref 23–29)
HCT VFR BLD AUTO: 38.7 % (ref 36–48)
HGB BLD-MCNC: 13.5 G/DL (ref 12–16)
LYMPHOCYTES # BLD: 2.9 K/UL (ref 1–5.1)
LYMPHOCYTES NFR BLD: 28.2 %
MCH RBC QN AUTO: 29 PG (ref 26–34)
MCHC RBC AUTO-ENTMCNC: 35 G/DL (ref 31–36)
MCV RBC AUTO: 82.8 FL (ref 80–100)
METHGB MFR BLDV: 0.1 %
METHGB MFR BLDV: 0.4 %
MONOCYTES # BLD: 0.9 K/UL (ref 0–1.3)
MONOCYTES NFR BLD: 9.1 %
NEUTROPHILS # BLD: 6 K/UL (ref 1.7–7.7)
NEUTROPHILS NFR BLD: 59.2 %
NT-PROBNP SERPL-MCNC: 59 PG/ML (ref 0–449)
O2 THERAPY: ABNORMAL
O2 THERAPY: ABNORMAL
PCO2 BLDV: 23 MMHG (ref 40–50)
PCO2 BLDV: 29 MMHG (ref 40–50)
PH BLDV: 7.55 [PH] (ref 7.35–7.45)
PH BLDV: 7.63 [PH] (ref 7.35–7.45)
PLATELET # BLD AUTO: 375 K/UL (ref 135–450)
PMV BLD AUTO: 8.3 FL (ref 5–10.5)
PO2 BLDV: 40 MMHG
PO2 BLDV: <30 MMHG
POTASSIUM SERPL-SCNC: 3.7 MMOL/L (ref 3.5–5.1)
PROT SERPL-MCNC: 8.1 G/DL (ref 6.4–8.2)
RBC # BLD AUTO: 4.67 M/UL (ref 4–5.2)
SAO2 % BLDV: 36 %
SAO2 % BLDV: 88 %
SODIUM SERPL-SCNC: 139 MMOL/L (ref 136–145)
TROPONIN, HIGH SENSITIVITY: 18 NG/L (ref 0–14)
TROPONIN, HIGH SENSITIVITY: 19 NG/L (ref 0–14)
WBC # BLD AUTO: 10.2 K/UL (ref 4–11)

## 2024-07-03 PROCEDURE — 71260 CT THORAX DX C+: CPT

## 2024-07-03 PROCEDURE — 83880 ASSAY OF NATRIURETIC PEPTIDE: CPT

## 2024-07-03 PROCEDURE — 85025 COMPLETE CBC W/AUTO DIFF WBC: CPT

## 2024-07-03 PROCEDURE — 84484 ASSAY OF TROPONIN QUANT: CPT

## 2024-07-03 PROCEDURE — 6360000004 HC RX CONTRAST MEDICATION: Performed by: EMERGENCY MEDICINE

## 2024-07-03 PROCEDURE — 71045 X-RAY EXAM CHEST 1 VIEW: CPT

## 2024-07-03 PROCEDURE — 93005 ELECTROCARDIOGRAM TRACING: CPT | Performed by: EMERGENCY MEDICINE

## 2024-07-03 PROCEDURE — 82803 BLOOD GASES ANY COMBINATION: CPT

## 2024-07-03 PROCEDURE — 93010 ELECTROCARDIOGRAM REPORT: CPT | Performed by: INTERNAL MEDICINE

## 2024-07-03 PROCEDURE — 6370000000 HC RX 637 (ALT 250 FOR IP): Performed by: EMERGENCY MEDICINE

## 2024-07-03 PROCEDURE — 80053 COMPREHEN METABOLIC PANEL: CPT

## 2024-07-03 PROCEDURE — 99285 EMERGENCY DEPT VISIT HI MDM: CPT

## 2024-07-03 RX ORDER — LORAZEPAM 0.5 MG/1
0.5 TABLET ORAL ONCE
Status: COMPLETED | OUTPATIENT
Start: 2024-07-03 | End: 2024-07-03

## 2024-07-03 RX ADMIN — IOPAMIDOL 75 ML: 755 INJECTION, SOLUTION INTRAVENOUS at 13:04

## 2024-07-03 RX ADMIN — LORAZEPAM 0.5 MG: 0.5 TABLET ORAL at 12:34

## 2024-07-03 NOTE — ED TRIAGE NOTES
Patient presents to ED via [facility] for c/o SOB and increased weakness. Per EMS, patient has a heart monitor and was told if she had any SOB or chest pain to call 911. Upon arrival to ED, patient appears to be very lethargic and SOB. Patient denies N/V. Per EMS, patient noted with EKG changes. Patient with hx of AFIB.

## 2024-07-03 NOTE — DISCHARGE INSTRUCTIONS
Dear Kourtney Swann,     Thank you for the privilege of caring for you today in the Emergency Department.     Please return to the Emergency Department if you develop any new or worsening symptoms, chest pain, shortness of breath, passing out or nearly passing out, or for any other concerns.     Regards,     Tyree Orellana MD, FACEP

## 2024-07-03 NOTE — ED NOTES
Pt neighbor Ning called to  pt at this time via pt contact list. Ning states that she will be here shortly to take pt home.

## 2024-07-03 NOTE — ED PROVIDER NOTES
[facility] EMERGENCY DEPARTMENT    CHIEF COMPLAINT  Shortness of Breath       HISTORY OF PRESENT ILLNESS  Kourtney Swann is a 79 y.o. female with past medical history including DM, seizure disorder (on Keppra), hypertension, atrial fibrillation (anticoagulated on Xarelto and also on flecainide and atenolol), bifascicular block, previous cardioversion in 2018, meningioma and as below who presents to the ED from home by EMS with complaint of generalized weakness and shortness of breath.  Patient denies fever, chest pain, nausea, vomiting, diarrhea, abdominal pain.  Symptoms started today.  Of note, patient was discharged from this hospital 6/21/2024 after admission for palpitations.  She was evaluated by cardiology and electrophysiology with recommendations for follow-up with cardiology at [facility].  Patient saw cardiologist Dr. Tam for an office visit on 7/1/2024.  Notes revealed patient had an EKG at that time that showed sinus rhythm with a right bundle branch block and left anterior fascicular block.  Symptoms were thought to possibly be related to rhythm disorders.  Patient presently wearing a 14-day event monitor.  Consideration at that time for permanent pacemaker implantation due to symptomatic bifascicular block.  Next follow-up in August.    I have reviewed the following from the nursing documentation:    Past Medical History:   Diagnosis Date    Atrial fibrillation (HCC)     Brain tumor (benign) (HCC)     MRI every 6 months has swelling which causes the seizure    CAD (coronary artery disease)     COPD (chronic obstructive pulmonary disease) (HCC)     Patient denies    Diabetes mellitus (HCC)     Hyperlipidemia     Hypertension     Meningioma (HCC)     RBBB (right bundle branch block)     Seizure (HCC)     2019 and 6/2022     Past Surgical History:   Procedure Laterality Date    CARDIOVERSION      HYSTERECTOMY (CERVIX STATUS UNKNOWN)      INTRACAPSULAR CATARACT EXTRACTION Left 8/8/2022

## 2024-07-22 ENCOUNTER — OFFICE VISIT (OUTPATIENT)
Dept: CARDIOLOGY CLINIC | Age: 79
End: 2024-07-22
Payer: MEDICARE

## 2024-07-22 VITALS
BODY MASS INDEX: 31.34 KG/M2 | WEIGHT: 166 LBS | HEIGHT: 61 IN | HEART RATE: 74 BPM | OXYGEN SATURATION: 98 % | DIASTOLIC BLOOD PRESSURE: 60 MMHG | SYSTOLIC BLOOD PRESSURE: 124 MMHG

## 2024-07-22 DIAGNOSIS — R07.9 CHEST PAIN, UNSPECIFIED TYPE: ICD-10-CM

## 2024-07-22 DIAGNOSIS — R00.2 PALPITATIONS: Primary | ICD-10-CM

## 2024-07-22 DIAGNOSIS — R94.31 ABNORMAL ELECTROCARDIOGRAPHY: ICD-10-CM

## 2024-07-22 DIAGNOSIS — R53.1 WEAKNESS: ICD-10-CM

## 2024-07-22 DIAGNOSIS — R06.02 SHORTNESS OF BREATH: ICD-10-CM

## 2024-07-22 PROCEDURE — 99215 OFFICE O/P EST HI 40 MIN: CPT | Performed by: INTERNAL MEDICINE

## 2024-07-22 PROCEDURE — 93000 ELECTROCARDIOGRAM COMPLETE: CPT | Performed by: INTERNAL MEDICINE

## 2024-07-22 PROCEDURE — 1123F ACP DISCUSS/DSCN MKR DOCD: CPT | Performed by: INTERNAL MEDICINE

## 2024-07-22 NOTE — PROGRESS NOTES
[facility]      Cardiology Progress Note    Kourtney Swann  1945 July 22, 2024        CC: \"I have been in and out of the hospital.\"     HPI:  The patient is 79 y.o. female with a past medical history significant for anxiety, HTN, HLD, seizure, AFIB (2015), RBBB, chronic bifascicular block, DM, COPD, CAD, brain tumor  who was hospitalized last month and my partner Dr. LONDON Mauro was consulted. Presented palpitations and heart racing. Accompanied by SOB. Similar to her AFIB breakthrough events. Compliant on atenolol and flecainide. She has followed with Dr. Tam and [facility] since 2015.     Today, she is accompanied by family. She reports that starting mid-may of this year, she started with episodes of SOB, lightheaded, discomfort in chest, heart racing, weak bilateral arms and worsens with activity. Tingling left temple and radiates down face and  mouth. Can last 30-45 minutes. Walking older dog and will have to sit down. This has occurred 2-3 times during the night. ADL's are limiting due to symptoms. She does admit that her anxiety with these events. She does not drink ETOH or smoke cigarettes. Patient denies fever, chills, nausea, vomiting, diarrhea, weight changes, changes in LE edema, and syncope. The patient admits to medical therapy compliance and feels she is tolerating. She denies abnormal bruising or bleeding.     Past Medical History:   Diagnosis Date    Atrial fibrillation (HCC)     Brain tumor (benign) (HCC)     MRI every 6 months has swelling which causes the seizure    CAD (coronary artery disease)     COPD (chronic obstructive pulmonary disease) (HCC)     Patient denies    Diabetes mellitus (HCC)     Hyperlipidemia     Hypertension     Meningioma (HCC)     RBBB (right bundle branch block)     Seizure (HCC)     2019 and 6/2022     Past Surgical History:   Procedure Laterality Date    CARDIOVERSION      HYSTERECTOMY (CERVIX STATUS UNKNOWN)      INTRACAPSULAR CATARACT

## 2024-07-27 ENCOUNTER — APPOINTMENT (OUTPATIENT)
Dept: GENERAL RADIOLOGY | Age: 79
End: 2024-07-27
Payer: MEDICARE

## 2024-07-27 ENCOUNTER — HOSPITAL ENCOUNTER (OUTPATIENT)
Age: 79
Setting detail: OBSERVATION
Discharge: HOME | End: 2024-07-30
Attending: EMERGENCY MEDICINE
Payer: MEDICARE

## 2024-07-27 ENCOUNTER — TELEPHONE (OUTPATIENT)
Dept: CARDIOLOGY | Age: 79
End: 2024-07-27

## 2024-07-27 ENCOUNTER — APPOINTMENT (OUTPATIENT)
Dept: CT IMAGING | Age: 79
End: 2024-07-27
Payer: MEDICARE

## 2024-07-27 DIAGNOSIS — R07.9 CHEST PAIN, UNSPECIFIED TYPE: Primary | ICD-10-CM

## 2024-07-27 DIAGNOSIS — R20.0 NUMBNESS ON LEFT SIDE: ICD-10-CM

## 2024-07-27 DIAGNOSIS — R06.02 SHORTNESS OF BREATH: ICD-10-CM

## 2024-07-27 PROBLEM — R07.89 ATYPICAL CHEST PAIN: Status: ACTIVE | Noted: 2024-07-27

## 2024-07-27 LAB
ALBUMIN SERPL-MCNC: 4.2 G/DL (ref 3.4–5)
ALBUMIN/GLOB SERPL: 1.1 {RATIO} (ref 1.1–2.2)
ALP SERPL-CCNC: 121 U/L (ref 40–129)
ALT SERPL-CCNC: 33 U/L (ref 10–40)
ANION GAP SERPL CALCULATED.3IONS-SCNC: 16 MMOL/L (ref 3–16)
AST SERPL-CCNC: 40 U/L (ref 15–37)
BASOPHILS # BLD: 0.1 K/UL (ref 0–0.2)
BASOPHILS NFR BLD: 0.9 %
BILIRUB SERPL-MCNC: 0.4 MG/DL (ref 0–1)
BUN SERPL-MCNC: 20 MG/DL (ref 7–20)
CALCIUM SERPL-MCNC: 10.5 MG/DL (ref 8.3–10.6)
CHLORIDE SERPL-SCNC: 102 MMOL/L (ref 99–110)
CHOLEST SERPL-MCNC: 153 MG/DL (ref 0–199)
CHP ED QC CHECK: YES
CO2 SERPL-SCNC: 21 MMOL/L (ref 21–32)
CREAT SERPL-MCNC: 1.1 MG/DL (ref 0.6–1.2)
DEPRECATED RDW RBC AUTO: 14.7 % (ref 12.4–15.4)
EOSINOPHIL # BLD: 0.2 K/UL (ref 0–0.6)
EOSINOPHIL NFR BLD: 2.5 %
GFR SERPLBLD CREATININE-BSD FMLA CKD-EPI: 50 ML/MIN/{1.73_M2}
GLUCOSE BLD-MCNC: 127 MG/DL (ref 70–99)
GLUCOSE BLD-MCNC: 135 MG/DL
GLUCOSE BLD-MCNC: 135 MG/DL (ref 70–99)
GLUCOSE BLD-MCNC: 99 MG/DL (ref 70–99)
GLUCOSE SERPL-MCNC: 136 MG/DL (ref 70–99)
HCT VFR BLD AUTO: 37.3 % (ref 36–48)
HDLC SERPL-MCNC: 59 MG/DL (ref 40–60)
HGB BLD-MCNC: 13 G/DL (ref 12–16)
INR PPP: 1.54 (ref 0.85–1.15)
LDLC SERPL CALC-MCNC: 72 MG/DL
LYMPHOCYTES # BLD: 2.6 K/UL (ref 1–5.1)
LYMPHOCYTES NFR BLD: 28.6 %
MCH RBC QN AUTO: 29.4 PG (ref 26–34)
MCHC RBC AUTO-ENTMCNC: 34.8 G/DL (ref 31–36)
MCV RBC AUTO: 84.6 FL (ref 80–100)
MONOCYTES # BLD: 0.9 K/UL (ref 0–1.3)
MONOCYTES NFR BLD: 9.8 %
NEUTROPHILS # BLD: 5.3 K/UL (ref 1.7–7.7)
NEUTROPHILS NFR BLD: 58.2 %
PERFORMED ON: ABNORMAL
PERFORMED ON: ABNORMAL
PERFORMED ON: NORMAL
PLATELET # BLD AUTO: 353 K/UL (ref 135–450)
PMV BLD AUTO: 8.3 FL (ref 5–10.5)
POTASSIUM SERPL-SCNC: 3.7 MMOL/L (ref 3.5–5.1)
PROT SERPL-MCNC: 7.9 G/DL (ref 6.4–8.2)
PROTHROMBIN TIME: 18.7 SEC (ref 11.9–14.9)
RBC # BLD AUTO: 4.4 M/UL (ref 4–5.2)
SODIUM SERPL-SCNC: 139 MMOL/L (ref 136–145)
TRIGL SERPL-MCNC: 109 MG/DL (ref 0–150)
TROPONIN, HIGH SENSITIVITY: 20 NG/L (ref 0–14)
TROPONIN, HIGH SENSITIVITY: 21 NG/L (ref 0–14)
TROPONIN, HIGH SENSITIVITY: 22 NG/L (ref 0–14)
VLDLC SERPL CALC-MCNC: 22 MG/DL
WBC # BLD AUTO: 9.1 K/UL (ref 4–11)

## 2024-07-27 PROCEDURE — 2580000003 HC RX 258: Performed by: STUDENT IN AN ORGANIZED HEALTH CARE EDUCATION/TRAINING PROGRAM

## 2024-07-27 PROCEDURE — 80053 COMPREHEN METABOLIC PANEL: CPT

## 2024-07-27 PROCEDURE — 36415 COLL VENOUS BLD VENIPUNCTURE: CPT

## 2024-07-27 PROCEDURE — 6360000004 HC RX CONTRAST MEDICATION: Performed by: EMERGENCY MEDICINE

## 2024-07-27 PROCEDURE — 83036 HEMOGLOBIN GLYCOSYLATED A1C: CPT

## 2024-07-27 PROCEDURE — 6370000000 HC RX 637 (ALT 250 FOR IP): Performed by: STUDENT IN AN ORGANIZED HEALTH CARE EDUCATION/TRAINING PROGRAM

## 2024-07-27 PROCEDURE — 70498 CT ANGIOGRAPHY NECK: CPT

## 2024-07-27 PROCEDURE — G0378 HOSPITAL OBSERVATION PER HR: HCPCS

## 2024-07-27 PROCEDURE — 99223 1ST HOSP IP/OBS HIGH 75: CPT | Performed by: STUDENT IN AN ORGANIZED HEALTH CARE EDUCATION/TRAINING PROGRAM

## 2024-07-27 PROCEDURE — 85610 PROTHROMBIN TIME: CPT

## 2024-07-27 PROCEDURE — 99285 EMERGENCY DEPT VISIT HI MDM: CPT

## 2024-07-27 PROCEDURE — 93005 ELECTROCARDIOGRAM TRACING: CPT | Performed by: EMERGENCY MEDICINE

## 2024-07-27 PROCEDURE — 84484 ASSAY OF TROPONIN QUANT: CPT

## 2024-07-27 PROCEDURE — 71045 X-RAY EXAM CHEST 1 VIEW: CPT

## 2024-07-27 PROCEDURE — 80061 LIPID PANEL: CPT

## 2024-07-27 PROCEDURE — 70450 CT HEAD/BRAIN W/O DYE: CPT

## 2024-07-27 PROCEDURE — 6360000002 HC RX W HCPCS: Performed by: EMERGENCY MEDICINE

## 2024-07-27 PROCEDURE — 85025 COMPLETE CBC W/AUTO DIFF WBC: CPT

## 2024-07-27 RX ORDER — POTASSIUM CHLORIDE 7.45 MG/ML
10 INJECTION INTRAVENOUS PRN
Status: DISCONTINUED | OUTPATIENT
Start: 2024-07-27 | End: 2024-07-30 | Stop reason: HOSPADM

## 2024-07-27 RX ORDER — ACETAMINOPHEN 650 MG/1
650 SUPPOSITORY RECTAL EVERY 6 HOURS PRN
Status: DISCONTINUED | OUTPATIENT
Start: 2024-07-27 | End: 2024-07-30 | Stop reason: HOSPADM

## 2024-07-27 RX ORDER — GLUCAGON 1 MG/ML
1 KIT INJECTION PRN
Status: DISCONTINUED | OUTPATIENT
Start: 2024-07-27 | End: 2024-07-30 | Stop reason: HOSPADM

## 2024-07-27 RX ORDER — ATENOLOL 25 MG/1
12.5 TABLET ORAL NIGHTLY
Status: DISCONTINUED | OUTPATIENT
Start: 2024-07-27 | End: 2024-07-30 | Stop reason: HOSPADM

## 2024-07-27 RX ORDER — MAGNESIUM SULFATE IN WATER 40 MG/ML
2000 INJECTION, SOLUTION INTRAVENOUS PRN
Status: DISCONTINUED | OUTPATIENT
Start: 2024-07-27 | End: 2024-07-30 | Stop reason: HOSPADM

## 2024-07-27 RX ORDER — LEVETIRACETAM 500 MG/1
500 TABLET ORAL 2 TIMES DAILY
Status: DISCONTINUED | OUTPATIENT
Start: 2024-07-27 | End: 2024-07-30 | Stop reason: HOSPADM

## 2024-07-27 RX ORDER — LORAZEPAM 2 MG/ML
0.5 INJECTION INTRAMUSCULAR ONCE
Status: DISCONTINUED | OUTPATIENT
Start: 2024-07-27 | End: 2024-07-27

## 2024-07-27 RX ORDER — FLECAINIDE ACETATE 100 MG/1
50 TABLET ORAL 2 TIMES DAILY
Status: DISCONTINUED | OUTPATIENT
Start: 2024-07-27 | End: 2024-07-30 | Stop reason: HOSPADM

## 2024-07-27 RX ORDER — ASPIRIN 81 MG/1
81 TABLET, CHEWABLE ORAL DAILY
Status: DISCONTINUED | OUTPATIENT
Start: 2024-07-27 | End: 2024-07-30 | Stop reason: HOSPADM

## 2024-07-27 RX ORDER — MIDAZOLAM HYDROCHLORIDE 1 MG/ML
2 INJECTION INTRAMUSCULAR; INTRAVENOUS ONCE
Status: COMPLETED | OUTPATIENT
Start: 2024-07-27 | End: 2024-07-27

## 2024-07-27 RX ORDER — ATORVASTATIN CALCIUM 40 MG/1
40 TABLET, FILM COATED ORAL NIGHTLY
Status: DISCONTINUED | OUTPATIENT
Start: 2024-07-27 | End: 2024-07-27

## 2024-07-27 RX ORDER — POTASSIUM CHLORIDE 20 MEQ/1
40 TABLET, EXTENDED RELEASE ORAL PRN
Status: DISCONTINUED | OUTPATIENT
Start: 2024-07-27 | End: 2024-07-30 | Stop reason: HOSPADM

## 2024-07-27 RX ORDER — ONDANSETRON 4 MG/1
4 TABLET, ORALLY DISINTEGRATING ORAL EVERY 8 HOURS PRN
Status: DISCONTINUED | OUTPATIENT
Start: 2024-07-27 | End: 2024-07-30 | Stop reason: HOSPADM

## 2024-07-27 RX ORDER — INSULIN LISPRO 100 [IU]/ML
0-4 INJECTION, SOLUTION INTRAVENOUS; SUBCUTANEOUS NIGHTLY
Status: DISCONTINUED | OUTPATIENT
Start: 2024-07-27 | End: 2024-07-30 | Stop reason: HOSPADM

## 2024-07-27 RX ORDER — AMLODIPINE BESYLATE 10 MG/1
10 TABLET ORAL DAILY
Status: DISCONTINUED | OUTPATIENT
Start: 2024-07-27 | End: 2024-07-30 | Stop reason: HOSPADM

## 2024-07-27 RX ORDER — INSULIN LISPRO 100 [IU]/ML
0-4 INJECTION, SOLUTION INTRAVENOUS; SUBCUTANEOUS
Status: DISCONTINUED | OUTPATIENT
Start: 2024-07-28 | End: 2024-07-30 | Stop reason: HOSPADM

## 2024-07-27 RX ORDER — ONDANSETRON 2 MG/ML
4 INJECTION INTRAMUSCULAR; INTRAVENOUS EVERY 6 HOURS PRN
Status: DISCONTINUED | OUTPATIENT
Start: 2024-07-27 | End: 2024-07-30 | Stop reason: HOSPADM

## 2024-07-27 RX ORDER — ATORVASTATIN CALCIUM 80 MG/1
80 TABLET, FILM COATED ORAL DAILY
Status: DISCONTINUED | OUTPATIENT
Start: 2024-07-27 | End: 2024-07-30 | Stop reason: HOSPADM

## 2024-07-27 RX ORDER — POLYETHYLENE GLYCOL 3350 17 G/17G
17 POWDER, FOR SOLUTION ORAL DAILY PRN
Status: DISCONTINUED | OUTPATIENT
Start: 2024-07-27 | End: 2024-07-30 | Stop reason: HOSPADM

## 2024-07-27 RX ORDER — SODIUM CHLORIDE 9 MG/ML
INJECTION, SOLUTION INTRAVENOUS PRN
Status: DISCONTINUED | OUTPATIENT
Start: 2024-07-27 | End: 2024-07-30 | Stop reason: HOSPADM

## 2024-07-27 RX ORDER — SODIUM CHLORIDE 0.9 % (FLUSH) 0.9 %
5-40 SYRINGE (ML) INJECTION EVERY 12 HOURS SCHEDULED
Status: DISCONTINUED | OUTPATIENT
Start: 2024-07-27 | End: 2024-07-30 | Stop reason: HOSPADM

## 2024-07-27 RX ORDER — DEXTROSE MONOHYDRATE 100 MG/ML
INJECTION, SOLUTION INTRAVENOUS CONTINUOUS PRN
Status: DISCONTINUED | OUTPATIENT
Start: 2024-07-27 | End: 2024-07-30 | Stop reason: HOSPADM

## 2024-07-27 RX ORDER — SODIUM CHLORIDE 0.9 % (FLUSH) 0.9 %
5-40 SYRINGE (ML) INJECTION PRN
Status: DISCONTINUED | OUTPATIENT
Start: 2024-07-27 | End: 2024-07-30 | Stop reason: HOSPADM

## 2024-07-27 RX ORDER — ACETAMINOPHEN 325 MG/1
650 TABLET ORAL EVERY 6 HOURS PRN
Status: DISCONTINUED | OUTPATIENT
Start: 2024-07-27 | End: 2024-07-30 | Stop reason: HOSPADM

## 2024-07-27 RX ADMIN — LEVETIRACETAM 500 MG: 500 TABLET, FILM COATED ORAL at 20:25

## 2024-07-27 RX ADMIN — AMLODIPINE BESYLATE 10 MG: 10 TABLET ORAL at 17:49

## 2024-07-27 RX ADMIN — RIVAROXABAN 20 MG: 20 TABLET, FILM COATED ORAL at 17:49

## 2024-07-27 RX ADMIN — SODIUM CHLORIDE, PRESERVATIVE FREE 10 ML: 5 INJECTION INTRAVENOUS at 20:26

## 2024-07-27 RX ADMIN — ATENOLOL 12.5 MG: 25 TABLET ORAL at 20:25

## 2024-07-27 RX ADMIN — FLECAINIDE ACETATE 50 MG: 100 TABLET ORAL at 20:26

## 2024-07-27 RX ADMIN — ATORVASTATIN CALCIUM 80 MG: 80 TABLET, FILM COATED ORAL at 17:49

## 2024-07-27 RX ADMIN — ASPIRIN 81 MG 81 MG: 81 TABLET ORAL at 17:49

## 2024-07-27 RX ADMIN — MIDAZOLAM 2 MG: 1 INJECTION INTRAMUSCULAR; INTRAVENOUS at 13:19

## 2024-07-27 RX ADMIN — IOPAMIDOL 75 ML: 755 INJECTION, SOLUTION INTRAVENOUS at 12:46

## 2024-07-27 ASSESSMENT — PAIN - FUNCTIONAL ASSESSMENT: PAIN_FUNCTIONAL_ASSESSMENT: NONE - DENIES PAIN

## 2024-07-27 NOTE — H&P
the salivary and thyroid glands. BONES: No acute osseous abnormality. CTA HEAD: ANTERIOR CIRCULATION: Focal moderate stenosis of the right supraclinoid ICA. Severe luminal narrowing throughout the left M2 superior division branch.  No significant stenosis of the intracranial internal carotid, anterior cerebral, or middle cerebral arteries. No aneurysm. POSTERIOR CIRCULATION: The right V4 segment effectively terminates as the PICA.  Moderate stenosis of the left mid V4 segment related to calcified plaque.  No significant stenosis of the basilar, or posterior cerebral arteries. No aneurysm. OTHER: No dural venous sinus thrombosis on this non-dedicated study. BRAIN: See concurrent head CT.     1. Severe luminal narrowing throughout the left M2 superior division branch. 2. Focal moderate stenosis of the right supraclinoid ICA. 3. Moderate stenosis of the left mid V4 segment related to calcified plaque. 4. No flow limiting stenosis within the neck. 5. No large vessel arterial occlusion within the head or neck.     CT HEAD WO CONTRAST    Addendum Date: 7/27/2024    ADDENDUM: The findings were relayed to Dr. Casper by the CORE team at 1:11 p.m. on 07/27/2024.     Result Date: 7/27/2024  EXAMINATION: CT OF THE HEAD WITHOUT CONTRAST  7/27/2024 12:37 pm TECHNIQUE: CT of the head was performed without the administration of intravenous contrast. Automated exposure control, iterative reconstruction, and/or weight based adjustment of the mA/kV was utilized to reduce the radiation dose to as low as reasonably achievable. COMPARISON: 11/23/2022 and 06/13/2022 HISTORY: ORDERING SYSTEM PROVIDED HISTORY: left sided numbness TECHNOLOGIST PROVIDED HISTORY: Reason for exam:->left sided numbness Has a \"code stroke\" or \"stroke alert\" been called?->Yes Decision Support Exception - unselect if not a suspected or confirmed emergency medical condition->Emergency Medical Condition (MA) Reason for Exam: left sided numbness FINDINGS:

## 2024-07-27 NOTE — ED NOTES
Pt resting comfortably. Pt stating that she feels so much better. Pt is no longer crying or feeling scared. Family at bedside. No needs voiced at this time. Pt alert and oriented. Bed in lowest position. Call light within reach.

## 2024-07-27 NOTE — PLAN OF CARE
Problem: Discharge Planning  Goal: Discharge to home or other facility with appropriate resources  Outcome: Progressing  Flowsheets (Taken 7/27/2024 1814)  Discharge to home or other facility with appropriate resources:   Identify barriers to discharge with patient and caregiver   Arrange for needed discharge resources and transportation as appropriate

## 2024-07-27 NOTE — ED PROVIDER NOTES
NIHSS was 0.  She was extremely anxious.  She was given 2 mg of Versed.  No acute findings on her CT head and CTA head and neck.  No acute EKG changes.  Initial troponin of 21.  Repeat troponin pending.  Hospitalist was consulted to admit for further evaluation.  Test results, diagnosis, and treatment plan were discussed with the patient.  She understands treatment plan and need for admission is agreeable.      I am the Primary Clinician of Record.      PROCEDURES:  None    FINAL IMPRESSION      1. Chest pain, unspecified type    2. Numbness on left side          DISPOSITION/PLAN   DISPOSITION Decision To Admit 07/27/2024 03:22:55 PM      PATIENT REFERRED TO:  No follow-up provider specified.    DISCHARGE MEDICATIONS:  New Prescriptions    No medications on file       (Please note that portions of this note were completed with a voice recognition program.  Efforts were made to edit the dictations but occasionally words are mis-transcribed.)    MARCEL GREWAL MD  Attending Emergency Physician        Marcel Grewal MD  07/27/24 0325

## 2024-07-27 NOTE — CONSULTS
Take 1 tablet by mouth 2 times daily 6/15/22   Shay Goldman MD   atenolol (TENORMIN) 25 MG tablet Take 0.5 tablets by mouth at bedtime    Sonya Fernandez MD   atorvastatin (LIPITOR) 80 MG tablet Take 1 tablet by mouth daily    Sonya Fernandez MD   flecainide (TAMBOCOR) 50 MG tablet Take 1 tablet by mouth 2 times daily    Sonya Fernandez MD   losartan-hydroCHLOROthiazide (HYZAAR) 100-25 MG per tablet Take 1 tablet by mouth daily    Sonya Fernandez MD   rivaroxaban (XARELTO) 20 MG TABS tablet Take 1 tablet by mouth Daily with supper    Sonya Fernandez MD   amLODIPine (NORVASC) 10 MG tablet Take 1 tablet by mouth daily    Sonya Fernandez MD   metFORMIN (GLUCOPHAGE-XR) 500 MG extended release tablet Take 3 tablets by mouth daily (with breakfast)    Sonya Fernandez MD   Multiple Vitamins-Minerals (THERAPEUTIC MULTIVITAMIN-MINERALS) tablet Take 1 tablet by mouth daily    Sonya Fernandez MD   Misc Natural Products (GLUCOSAMINE CHOND MSM FORMULA) TABS Take 1 tablet by mouth daily    Sonya Fernandez MD   vitamin D (CHOLECALCIFEROL) 25 MCG (1000 UT) TABS tablet Take 1 tablet by mouth daily    Sonya Fernandez MD        Current Medications:  Current Facility-Administered Medications   Medication Dose Route Frequency Provider Last Rate Last Admin    aspirin chewable tablet 81 mg  81 mg Oral Daily Shukri Viera MD   81 mg at 07/27/24 1749    amLODIPine (NORVASC) tablet 10 mg  10 mg Oral Daily Shukri Viera MD   10 mg at 07/27/24 1749    atenolol (TENORMIN) tablet 12.5 mg  12.5 mg Oral Nightly Shukri Viera MD        atorvastatin (LIPITOR) tablet 80 mg  80 mg Oral Daily Shukri Viera MD   80 mg at 07/27/24 1749    flecainide (TAMBOCOR) tablet 50 mg  50 mg Oral BID Shukri Viera MD        levETIRAcetam (KEPPRA) tablet 500 mg  500 mg Oral BID Shukri Viera MD        glucose chewable tablet 16 g  4 tablet Oral PRN Shukri Viera MD        dextrose bolus 10% 125 mL  125

## 2024-07-27 NOTE — ED TRIAGE NOTES
Pt with left sided chest pressure that radiates from left arm into left temple since 1040. Cardiologist advised to come to ED.  States these are chronic spells that have been going on since May.

## 2024-07-27 NOTE — TELEPHONE ENCOUNTER
INTERVENTIONAL CARDIOLOGY BRIEF NOTE    This is a patient of Dr. Meneses who had been having some increasing shortness of breath, fatigue, inability to perform her normal activities of daily living.  Plan was for outpatient echocardiogram as well as stress evaluation which I agree with.  Her friend Ning called the after-hours number and I was able to discuss with her it sounds as the patient is getting worse having increasing shortness of breath and inability now to do her normal basic functions.    Go ahead and recommend that the patient come to the emergency department for evaluation we will plan to have admitted by medicine service with cardiology consult and plans for echocardiogram and stress test as an inpatient.  This was discussed with her family directly who agreed.    Merlin Gilbert MD  Interventional Cardiology  7/27/2024  11:09 AM    [facility] [place] [id number], [address]   [place] [address]  Ph: [phone]  Fax: [phone]

## 2024-07-28 LAB
EST. AVERAGE GLUCOSE BLD GHB EST-MCNC: 125.5 MG/DL
GLUCOSE BLD-MCNC: 106 MG/DL (ref 70–99)
GLUCOSE BLD-MCNC: 167 MG/DL (ref 70–99)
GLUCOSE BLD-MCNC: 182 MG/DL (ref 70–99)
GLUCOSE BLD-MCNC: 193 MG/DL (ref 70–99)
HBA1C MFR BLD: 6 %
PERFORMED ON: ABNORMAL
TROPONIN, HIGH SENSITIVITY: 16 NG/L (ref 0–14)
TROPONIN, HIGH SENSITIVITY: 19 NG/L (ref 0–14)
TROPONIN, HIGH SENSITIVITY: 25 NG/L (ref 0–14)
TROPONIN, HIGH SENSITIVITY: 25 NG/L (ref 0–14)

## 2024-07-28 PROCEDURE — 36415 COLL VENOUS BLD VENIPUNCTURE: CPT

## 2024-07-28 PROCEDURE — 94760 N-INVAS EAR/PLS OXIMETRY 1: CPT

## 2024-07-28 PROCEDURE — 2580000003 HC RX 258: Performed by: STUDENT IN AN ORGANIZED HEALTH CARE EDUCATION/TRAINING PROGRAM

## 2024-07-28 PROCEDURE — G0378 HOSPITAL OBSERVATION PER HR: HCPCS

## 2024-07-28 PROCEDURE — 84484 ASSAY OF TROPONIN QUANT: CPT

## 2024-07-28 PROCEDURE — 6370000000 HC RX 637 (ALT 250 FOR IP): Performed by: STUDENT IN AN ORGANIZED HEALTH CARE EDUCATION/TRAINING PROGRAM

## 2024-07-28 RX ADMIN — ASPIRIN 81 MG 81 MG: 81 TABLET ORAL at 08:10

## 2024-07-28 RX ADMIN — ATENOLOL 12.5 MG: 25 TABLET ORAL at 21:59

## 2024-07-28 RX ADMIN — LEVETIRACETAM 500 MG: 500 TABLET, FILM COATED ORAL at 08:10

## 2024-07-28 RX ADMIN — LEVETIRACETAM 500 MG: 500 TABLET, FILM COATED ORAL at 21:59

## 2024-07-28 RX ADMIN — AMLODIPINE BESYLATE 10 MG: 10 TABLET ORAL at 08:10

## 2024-07-28 RX ADMIN — SODIUM CHLORIDE, PRESERVATIVE FREE 10 ML: 5 INJECTION INTRAVENOUS at 08:11

## 2024-07-28 RX ADMIN — SODIUM CHLORIDE, PRESERVATIVE FREE 10 ML: 5 INJECTION INTRAVENOUS at 22:01

## 2024-07-28 RX ADMIN — FLECAINIDE ACETATE 50 MG: 100 TABLET ORAL at 21:59

## 2024-07-28 RX ADMIN — FLECAINIDE ACETATE 50 MG: 100 TABLET ORAL at 08:10

## 2024-07-28 RX ADMIN — RIVAROXABAN 20 MG: 20 TABLET, FILM COATED ORAL at 17:02

## 2024-07-28 RX ADMIN — ATORVASTATIN CALCIUM 80 MG: 80 TABLET, FILM COATED ORAL at 08:10

## 2024-07-28 NOTE — PLAN OF CARE
Problem: Discharge Planning  Goal: Discharge to home or other facility with appropriate resources  7/28/2024 1041 by Disha Mays, RN  Outcome: Progressing  Flowsheets (Taken 7/28/2024 0810)  Discharge to home or other facility with appropriate resources:   Identify barriers to discharge with patient and caregiver   Arrange for needed discharge resources and transportation as appropriate  7/27/2024 2222 by Yefri King, RN  Outcome: Progressing  Flowsheets (Taken 7/27/2024 2040)  Discharge to home or other facility with appropriate resources:   Identify barriers to discharge with patient and caregiver   Arrange for needed discharge resources and transportation as appropriate   Identify discharge learning needs (meds, wound care, etc)     Problem: Safety - Adult  Goal: Free from fall injury  Outcome: Progressing

## 2024-07-28 NOTE — PLAN OF CARE
Problem: Discharge Planning  Goal: Discharge to home or other facility with appropriate resources  7/27/2024 2222 by Yefri King, RN  Outcome: Progressing  7/27/2024 1817 by Disha Mays, RN  Outcome: Progressing  Flowsheets (Taken 7/27/2024 1814)  Discharge to home or other facility with appropriate resources:   Identify barriers to discharge with patient and caregiver   Arrange for needed discharge resources and transportation as appropriate

## 2024-07-29 ENCOUNTER — APPOINTMENT (OUTPATIENT)
Age: 79
End: 2024-07-29
Attending: STUDENT IN AN ORGANIZED HEALTH CARE EDUCATION/TRAINING PROGRAM
Payer: MEDICARE

## 2024-07-29 ENCOUNTER — APPOINTMENT (OUTPATIENT)
Dept: NUCLEAR MEDICINE | Age: 79
End: 2024-07-29
Payer: MEDICARE

## 2024-07-29 ENCOUNTER — APPOINTMENT (OUTPATIENT)
Age: 79
End: 2024-07-29
Payer: MEDICARE

## 2024-07-29 PROBLEM — I10 PRIMARY HYPERTENSION: Status: ACTIVE | Noted: 2024-07-29

## 2024-07-29 PROBLEM — R06.02 SHORTNESS OF BREATH: Status: ACTIVE | Noted: 2024-07-29

## 2024-07-29 PROBLEM — R07.9 CHEST PAIN: Status: ACTIVE | Noted: 2024-07-27

## 2024-07-29 LAB
ECHO AO ROOT DIAM: 3.1 CM
ECHO AO ROOT INDEX: 1.78 CM/M2
ECHO AR MAX VEL PISA: 3.8 M/S
ECHO AV AREA PEAK VELOCITY: 2 CM2
ECHO AV AREA VTI: 2 CM2
ECHO AV AREA/BSA PEAK VELOCITY: 1.1 CM2/M2
ECHO AV AREA/BSA VTI: 1.1 CM2/M2
ECHO AV MEAN GRADIENT: 6 MMHG
ECHO AV MEAN VELOCITY: 1.2 M/S
ECHO AV PEAK GRADIENT: 12 MMHG
ECHO AV PEAK VELOCITY: 1.7 M/S
ECHO AV REGURGITANT PHT: 710 MS
ECHO AV VELOCITY RATIO: 0.65
ECHO AV VTI: 40.3 CM
ECHO BSA: 1.79 M2
ECHO BSA: 2.75 M2
ECHO EST RA PRESSURE: 3 MMHG
ECHO IVC EXP: 1.9 CM
ECHO IVC INSP: 0.5 CM
ECHO LA AREA 2C: 25.7 CM2
ECHO LA AREA 4C: 20.4 CM2
ECHO LA MAJOR AXIS: 5.6 CM
ECHO LA MINOR AXIS: 6.2 CM
ECHO LA VOL BP: 74 ML (ref 22–52)
ECHO LA VOL MOD A2C: 86 ML (ref 22–52)
ECHO LA VOL MOD A4C: 57 ML (ref 22–52)
ECHO LA VOL/BSA BIPLANE: 43 ML/M2 (ref 16–34)
ECHO LA VOLUME INDEX MOD A2C: 49 ML/M2 (ref 16–34)
ECHO LA VOLUME INDEX MOD A4C: 33 ML/M2 (ref 16–34)
ECHO LV E' LATERAL VELOCITY: 9 CM/S
ECHO LV E' SEPTAL VELOCITY: 5 CM/S
ECHO LV EDV 3D: 120 ML
ECHO LV EDV INDEX 3D: 69 ML/M2
ECHO LV EJECTION FRACTION 3D: 66 %
ECHO LV ESV 3D: 41 ML
ECHO LV ESV INDEX 3D: 24 ML/M2
ECHO LV FRACTIONAL SHORTENING: 39 % (ref 28–44)
ECHO LV INTERNAL DIMENSION DIASTOLE INDEX: 2.93 CM/M2
ECHO LV INTERNAL DIMENSION DIASTOLIC: 5.1 CM (ref 3.9–5.3)
ECHO LV INTERNAL DIMENSION SYSTOLIC INDEX: 1.78 CM/M2
ECHO LV INTERNAL DIMENSION SYSTOLIC: 3.1 CM
ECHO LV IVSD: 1 CM (ref 0.6–0.9)
ECHO LV MASS 2D: 200.8 G (ref 67–162)
ECHO LV MASS 3D INDEX: 101.1 G/M2
ECHO LV MASS 3D: 176 G
ECHO LV MASS INDEX 2D: 115.4 G/M2 (ref 43–95)
ECHO LV POSTERIOR WALL DIASTOLIC: 1.1 CM (ref 0.6–0.9)
ECHO LV RELATIVE WALL THICKNESS RATIO: 0.43
ECHO LVOT AREA: 3.1 CM2
ECHO LVOT AV VTI INDEX: 0.63
ECHO LVOT DIAM: 2 CM
ECHO LVOT MEAN GRADIENT: 2 MMHG
ECHO LVOT PEAK GRADIENT: 5 MMHG
ECHO LVOT PEAK VELOCITY: 1.1 M/S
ECHO LVOT STROKE VOLUME INDEX: 45.8 ML/M2
ECHO LVOT SV: 79.8 ML
ECHO LVOT VTI: 25.4 CM
ECHO MV A VELOCITY: 1.39 M/S
ECHO MV E DECELERATION TIME (DT): 153 MS
ECHO MV E VELOCITY: 1.23 M/S
ECHO MV E/A RATIO: 0.88
ECHO MV E/E' LATERAL: 13.67
ECHO MV E/E' RATIO (AVERAGED): 19.13
ECHO MV E/E' SEPTAL: 24.6
ECHO MV REGURGITANT PEAK GRADIENT: 139 MMHG
ECHO MV REGURGITANT PEAK VELOCITY: 5.9 M/S
ECHO MV REGURGITANT VTIA: 239 CM
ECHO PV MAX VELOCITY: 1 M/S
ECHO PV MEAN GRADIENT: 2 MMHG
ECHO PV MEAN VELOCITY: 0.7 M/S
ECHO PV PEAK GRADIENT: 4 MMHG
ECHO PV VTI: 23.4 CM
ECHO RA AREA 4C: 15.9 CM2
ECHO RA END SYSTOLIC VOLUME APICAL 4 CHAMBER INDEX BSA: 25 ML/M2
ECHO RA VOLUME: 43 ML
ECHO RIGHT VENTRICULAR SYSTOLIC PRESSURE (RVSP): 39 MMHG
ECHO RV BASAL DIMENSION: 3.7 CM
ECHO RV FREE WALL PEAK S': 9 CM/S
ECHO RV TAPSE: 1.7 CM (ref 1.7–?)
ECHO TV REGURGITANT MAX VELOCITY: 3 M/S
ECHO TV REGURGITANT PEAK GRADIENT: 29 MMHG
EKG ATRIAL RATE: 78 BPM
EKG DIAGNOSIS: NORMAL
EKG P AXIS: 41 DEGREES
EKG P-R INTERVAL: 158 MS
EKG Q-T INTERVAL: 454 MS
EKG QRS DURATION: 156 MS
EKG QTC CALCULATION (BAZETT): 517 MS
EKG R AXIS: -66 DEGREES
EKG T AXIS: 27 DEGREES
EKG VENTRICULAR RATE: 78 BPM
GLUCOSE BLD-MCNC: 216 MG/DL (ref 70–99)
GLUCOSE BLD-MCNC: 220 MG/DL (ref 70–99)
GLUCOSE BLD-MCNC: 96 MG/DL (ref 70–99)
NUC REST DIASTOLIC VOLUME INDEX: 86 ML/M2
NUC REST EJECTION FRACTION: 76 %
NUC REST SYSTOLIC VOLUME INDEX: 21 ML/M2
PERFORMED ON: ABNORMAL
PERFORMED ON: ABNORMAL
PERFORMED ON: NORMAL
STRESS BASELINE DIAS BP: 72 MMHG
STRESS BASELINE HR: 59 BPM
STRESS BASELINE SYS BP: 142 MMHG
STRESS ESTIMATED WORKLOAD: 1 METS
STRESS EXERCISE DUR MIN: 1 MIN
STRESS EXERCISE DUR SEC: 40 SEC
STRESS O2 SAT PEAK: 99 %
STRESS O2 SAT REST: 97 %
STRESS PEAK DIAS BP: 66 MMHG
STRESS PEAK SYS BP: 148 MMHG
STRESS PERCENT HR ACHIEVED: 61 %
STRESS POST PEAK HR: 85 BPM
STRESS RATE PRESSURE PRODUCT: NORMAL BPM*MMHG
STRESS TARGET HR: 139 BPM
TROPONIN, HIGH SENSITIVITY: 15 NG/L (ref 0–14)
TROPONIN, HIGH SENSITIVITY: 16 NG/L (ref 0–14)
TROPONIN, HIGH SENSITIVITY: 17 NG/L (ref 0–14)

## 2024-07-29 PROCEDURE — 2580000003 HC RX 258: Performed by: STUDENT IN AN ORGANIZED HEALTH CARE EDUCATION/TRAINING PROGRAM

## 2024-07-29 PROCEDURE — 78452 HT MUSCLE IMAGE SPECT MULT: CPT

## 2024-07-29 PROCEDURE — 3430000000 HC RX DIAGNOSTIC RADIOPHARMACEUTICAL: Performed by: STUDENT IN AN ORGANIZED HEALTH CARE EDUCATION/TRAINING PROGRAM

## 2024-07-29 PROCEDURE — A9502 TC99M TETROFOSMIN: HCPCS | Performed by: STUDENT IN AN ORGANIZED HEALTH CARE EDUCATION/TRAINING PROGRAM

## 2024-07-29 PROCEDURE — G0378 HOSPITAL OBSERVATION PER HR: HCPCS

## 2024-07-29 PROCEDURE — 94760 N-INVAS EAR/PLS OXIMETRY 1: CPT

## 2024-07-29 PROCEDURE — 84484 ASSAY OF TROPONIN QUANT: CPT

## 2024-07-29 PROCEDURE — 6360000002 HC RX W HCPCS: Performed by: STUDENT IN AN ORGANIZED HEALTH CARE EDUCATION/TRAINING PROGRAM

## 2024-07-29 PROCEDURE — 36415 COLL VENOUS BLD VENIPUNCTURE: CPT

## 2024-07-29 PROCEDURE — 93306 TTE W/DOPPLER COMPLETE: CPT | Performed by: INTERNAL MEDICINE

## 2024-07-29 PROCEDURE — 93016 CV STRESS TEST SUPVJ ONLY: CPT | Performed by: INTERNAL MEDICINE

## 2024-07-29 PROCEDURE — 93018 CV STRESS TEST I&R ONLY: CPT | Performed by: INTERNAL MEDICINE

## 2024-07-29 PROCEDURE — 99233 SBSQ HOSP IP/OBS HIGH 50: CPT | Performed by: NURSE PRACTITIONER

## 2024-07-29 PROCEDURE — 93010 ELECTROCARDIOGRAM REPORT: CPT | Performed by: INTERNAL MEDICINE

## 2024-07-29 PROCEDURE — 93306 TTE W/DOPPLER COMPLETE: CPT

## 2024-07-29 PROCEDURE — 93017 CV STRESS TEST TRACING ONLY: CPT

## 2024-07-29 PROCEDURE — 78452 HT MUSCLE IMAGE SPECT MULT: CPT | Performed by: INTERNAL MEDICINE

## 2024-07-29 PROCEDURE — 6370000000 HC RX 637 (ALT 250 FOR IP): Performed by: STUDENT IN AN ORGANIZED HEALTH CARE EDUCATION/TRAINING PROGRAM

## 2024-07-29 RX ORDER — REGADENOSON 0.08 MG/ML
0.4 INJECTION, SOLUTION INTRAVENOUS
Status: COMPLETED | OUTPATIENT
Start: 2024-07-29 | End: 2024-07-29

## 2024-07-29 RX ADMIN — TETROFOSMIN 34 MILLICURIE: 1.38 INJECTION, POWDER, LYOPHILIZED, FOR SOLUTION INTRAVENOUS at 08:50

## 2024-07-29 RX ADMIN — AMLODIPINE BESYLATE 10 MG: 10 TABLET ORAL at 11:01

## 2024-07-29 RX ADMIN — INSULIN LISPRO 1 UNITS: 100 INJECTION, SOLUTION INTRAVENOUS; SUBCUTANEOUS at 14:50

## 2024-07-29 RX ADMIN — SODIUM CHLORIDE, PRESERVATIVE FREE 10 ML: 5 INJECTION INTRAVENOUS at 11:02

## 2024-07-29 RX ADMIN — REGADENOSON 0.4 MG: 0.08 INJECTION, SOLUTION INTRAVENOUS at 08:46

## 2024-07-29 RX ADMIN — RIVAROXABAN 20 MG: 20 TABLET, FILM COATED ORAL at 17:23

## 2024-07-29 RX ADMIN — FLECAINIDE ACETATE 50 MG: 100 TABLET ORAL at 11:01

## 2024-07-29 RX ADMIN — ASPIRIN 81 MG 81 MG: 81 TABLET ORAL at 11:00

## 2024-07-29 RX ADMIN — LEVETIRACETAM 500 MG: 500 TABLET, FILM COATED ORAL at 11:00

## 2024-07-29 RX ADMIN — FLECAINIDE ACETATE 50 MG: 100 TABLET ORAL at 21:54

## 2024-07-29 RX ADMIN — ATENOLOL 12.5 MG: 25 TABLET ORAL at 21:54

## 2024-07-29 RX ADMIN — SODIUM CHLORIDE, PRESERVATIVE FREE 10 ML: 5 INJECTION INTRAVENOUS at 21:55

## 2024-07-29 RX ADMIN — TETROFOSMIN 12.9 MILLICURIE: 1.38 INJECTION, POWDER, LYOPHILIZED, FOR SOLUTION INTRAVENOUS at 07:27

## 2024-07-29 RX ADMIN — ATORVASTATIN CALCIUM 80 MG: 80 TABLET, FILM COATED ORAL at 11:00

## 2024-07-29 RX ADMIN — LEVETIRACETAM 500 MG: 500 TABLET, FILM COATED ORAL at 21:54

## 2024-07-29 NOTE — CARE COORDINATION
Case Management Assessment  Initial Evaluation    Date/Time of Evaluation: 7/29/2024 2:21 PM  Assessment Completed by: MATEUS Carroll    If patient is discharged prior to next notation, then this note serves as note for discharge by case management.    Patient Name: Kourtney Swann                   YOB: 1945  Diagnosis: Atypical chest pain [R07.89]  Numbness on left side [R20.0]  Chest pain, unspecified type [R07.9]                   Date / Time: 7/27/2024 12:20 PM    Patient Admission Status: Observation   Readmission Risk (Low < 19, Mod (19-27), High > 27): No data recorded  Current PCP: Wes Woodward  PCP verified by CM? Yes    Chart Reviewed: Yes      History Provided by: Patient  Patient Orientation: Alert and Oriented, Person, Place, Situation, Self    Patient Cognition: Alert    Hospitalization in the last 30 days (Readmission):  No    If yes, Readmission Assessment in CM Navigator will be completed.    Advance Directives:      Code Status: Full Code   Patient's Primary Decision Maker is: Named in Scanned ACP Document (Pt reported that her neighborNicole is her HCPOA.  No paperwork obtained.)      Discharge Planning:    Patient lives with: Alone Type of Home: House (2 story home with stair lift leading to second floor.  [address])  Primary Care Giver: Self  Patient Support Systems include: Friends/Neighbors   Current Financial resources: Medicare  Current community resources: None  Current services prior to admission: Durable Medical Equipment            Current DME: Other (Comment) (Pt reported having DME from her late spouse.  Pt stated she does not use any DME at home.)            Type of Home Care services:  None    ADLS  Prior functional level: Independent in ADLs/IADLs  Current functional level: Independent in ADLs/IADLs    PT AM-PAC:   /24  OT AM-PAC:   /24    Family can provide assistance at DC: Other (comment) (Pts neighbors/friends are supportive)  Would you like Case

## 2024-07-29 NOTE — PLAN OF CARE
Problem: Discharge Planning  Goal: Discharge to home or other facility with appropriate resources  Outcome: Progressing  Flowsheets  Taken 7/28/2024 2157 by Zofia Alas, RN  Discharge to home or other facility with appropriate resources: Identify barriers to discharge with patient and caregiver  Taken 7/28/2024 1400 by Bel Lyon RN  Discharge to home or other facility with appropriate resources: Identify barriers to discharge with patient and caregiver     Problem: Safety - Adult  Goal: Free from fall injury  Outcome: Progressing     Problem: Cardiovascular - Adult  Goal: Maintains optimal cardiac output and hemodynamic stability  Outcome: Progressing  Goal: Absence of cardiac dysrhythmias or at baseline  Outcome: Progressing     Problem: Metabolic/Fluid and Electrolytes - Adult  Goal: Electrolytes maintained within normal limits  Outcome: Progressing  Goal: Hemodynamic stability and optimal renal function maintained  Outcome: Progressing  Goal: Glucose maintained within prescribed range  Outcome: Progressing     Problem: Hematologic - Adult  Goal: Maintains hematologic stability  Outcome: Progressing

## 2024-07-29 NOTE — DISCHARGE SUMMARY
orbits is unremarkable. SINUSES: The paranasal sinuses and mastoid air cells are clear. SOFT TISSUES/SKULL:  No lytic or blastic osseous lesions are identified.     1. Stable 3 cm extra-axial mass overlying the anterior and inferior aspect of the left frontal lobe, consistent with a meningioma. 2. No acute infarct or acute intracranial hemorrhage. The findings were sent to the [facility] at 1:03 pm on 7/27/2024 to be communicated to a licensed caregiver.     XR CHEST PORTABLE    Result Date: 7/27/2024  EXAMINATION: ONE XRAY VIEW OF THE CHEST 7/27/2024 11:00 am COMPARISON: None. HISTORY: ORDERING SYSTEM PROVIDED HISTORY: stroke symptoms TECHNOLOGIST PROVIDED HISTORY: Reason for exam:->stroke symptoms Reason for Exam: right arm numbness FINDINGS: The lungs are without acute focal process.  There is no effusion or pneumothorax. The cardiomediastinal silhouette is without acute process. The osseous structures are without acute process.     No acute process.       CBC:   Recent Labs     07/27/24  1240   WBC 9.1   HGB 13.0        BMP:    Recent Labs     07/27/24  1239 07/27/24  1240   NA  --  139   K  --  3.7   CL  --  102   CO2  --  21   BUN  --  20   CREATININE  --  1.1   GLUCOSE 135 136*     Hepatic:   Recent Labs     07/27/24  1240   AST 40*   ALT 33   BILITOT 0.4   ALKPHOS 121     Lipids:   Lab Results   Component Value Date/Time    CHOL 153 07/27/2024 05:35 PM    HDL 59 07/27/2024 05:35 PM    TRIG 109 07/27/2024 05:35 PM     Hemoglobin A1C:   Lab Results   Component Value Date/Time    LABA1C 6.0 07/27/2024 12:40 PM     TSH: No results found for: \"TSH\"  Troponin: No results found for: \"TROPONINT\"  Lactic Acid: No results for input(s): \"LACTA\" in the last 72 hours.  BNP: No results for input(s): \"PROBNP\" in the last 72 hours.  UA:  Lab Results   Component Value Date/Time    NITRU Negative 11/23/2022 09:58 PM    COLORU Yellow 11/23/2022 09:58 PM    PHUR 5.5 11/23/2022 09:58 PM

## 2024-07-29 NOTE — DISCHARGE INSTRUCTIONS
F/u with cardiology as directed to discuss long-term monitoring as well as discuss Watchman device

## 2024-07-29 NOTE — PLAN OF CARE
Problem: Discharge Planning  Goal: Discharge to home or other facility with appropriate resources  7/29/2024 0051 by Zofia Alas, RN  Outcome: Progressing  Flowsheets  Taken 7/28/2024 2157 by Zofia Alas, RN  Discharge to home or other facility with appropriate resources: Identify barriers to discharge with patient and caregiver  Taken 7/28/2024 1400 by Bel Lyon RN  Discharge to home or other facility with appropriate resources: Identify barriers to discharge with patient and caregiver     Problem: Safety - Adult  Goal: Free from fall injury  7/29/2024 1144 by Krystle Rosa, RN  Outcome: Progressing  7/29/2024 0051 by Zofia Alas, RN  Outcome: Progressing     Problem: Cardiovascular - Adult  Goal: Maintains optimal cardiac output and hemodynamic stability  7/29/2024 0051 by oZfia Alas, RN  Outcome: Progressing  Goal: Absence of cardiac dysrhythmias or at baseline  7/29/2024 0051 by Zofia Alas, RN  Outcome: Progressing

## 2024-07-30 VITALS
WEIGHT: 168.43 LBS | HEART RATE: 68 BPM | TEMPERATURE: 97.4 F | OXYGEN SATURATION: 98 % | RESPIRATION RATE: 16 BRPM | DIASTOLIC BLOOD PRESSURE: 70 MMHG | BODY MASS INDEX: 31.8 KG/M2 | HEIGHT: 61 IN | SYSTOLIC BLOOD PRESSURE: 135 MMHG

## 2024-07-30 LAB
GLUCOSE BLD-MCNC: 110 MG/DL (ref 70–99)
GLUCOSE BLD-MCNC: 143 MG/DL (ref 70–99)
GLUCOSE BLD-MCNC: 232 MG/DL (ref 70–99)
PERFORMED ON: ABNORMAL
TROPONIN, HIGH SENSITIVITY: 15 NG/L (ref 0–14)
TROPONIN, HIGH SENSITIVITY: 17 NG/L (ref 0–14)
TROPONIN, HIGH SENSITIVITY: 19 NG/L (ref 0–14)

## 2024-07-30 PROCEDURE — 94760 N-INVAS EAR/PLS OXIMETRY 1: CPT

## 2024-07-30 PROCEDURE — G0378 HOSPITAL OBSERVATION PER HR: HCPCS

## 2024-07-30 PROCEDURE — 2580000003 HC RX 258: Performed by: STUDENT IN AN ORGANIZED HEALTH CARE EDUCATION/TRAINING PROGRAM

## 2024-07-30 PROCEDURE — 36415 COLL VENOUS BLD VENIPUNCTURE: CPT

## 2024-07-30 PROCEDURE — 6370000000 HC RX 637 (ALT 250 FOR IP): Performed by: STUDENT IN AN ORGANIZED HEALTH CARE EDUCATION/TRAINING PROGRAM

## 2024-07-30 PROCEDURE — 84484 ASSAY OF TROPONIN QUANT: CPT

## 2024-07-30 RX ADMIN — AMLODIPINE BESYLATE 10 MG: 10 TABLET ORAL at 09:23

## 2024-07-30 RX ADMIN — SODIUM CHLORIDE, PRESERVATIVE FREE 10 ML: 5 INJECTION INTRAVENOUS at 09:23

## 2024-07-30 RX ADMIN — ASPIRIN 81 MG 81 MG: 81 TABLET ORAL at 09:23

## 2024-07-30 RX ADMIN — LEVETIRACETAM 500 MG: 500 TABLET, FILM COATED ORAL at 09:23

## 2024-07-30 RX ADMIN — ATORVASTATIN CALCIUM 80 MG: 80 TABLET, FILM COATED ORAL at 09:23

## 2024-07-30 RX ADMIN — RIVAROXABAN 20 MG: 20 TABLET, FILM COATED ORAL at 18:21

## 2024-07-30 RX ADMIN — INSULIN LISPRO 1 UNITS: 100 INJECTION, SOLUTION INTRAVENOUS; SUBCUTANEOUS at 15:37

## 2024-07-30 RX ADMIN — FLECAINIDE ACETATE 50 MG: 100 TABLET ORAL at 09:24

## 2024-07-30 NOTE — PLAN OF CARE
Problem: Discharge Planning  Goal: Discharge to home or other facility with appropriate resources  Outcome: Progressing     Problem: Cardiovascular - Adult  Goal: Maintains optimal cardiac output and hemodynamic stability  Outcome: Progressing

## 2024-07-30 NOTE — PROGRESS NOTES
Cardiology - PROGRESS NOTE    Admit Date: 7/27/2024     Reason for follow up: chest pain           Social History:   reports that she has never smoked. She has never used smokeless tobacco. She reports that she does not drink alcohol and does not use drugs.   Family History: family history is not on file.        Interval History:   Patient seen and examined and notes reviewed   Echo and stress test today   Sitting in chair  No CP during stress testing     All other systems reviewed and negative except as above.        Diet: ADULT DIET; Regular; 5 carb choices (75 gm/meal)      In: 1070 [P.O.:1070]  Out: -    Patient Vitals for the past 96 hrs (Last 3 readings):   Weight   07/29/24 0807 74.8 kg (165 lb)   07/29/24 0425 75.1 kg (165 lb 9.1 oz)   07/28/24 0445 74.8 kg (165 lb)           Data:   Scheduled Meds:   Scheduled Meds:   aspirin  81 mg Oral Daily    amLODIPine  10 mg Oral Daily    atenolol  12.5 mg Oral Nightly    atorvastatin  80 mg Oral Daily    flecainide  50 mg Oral BID    levETIRAcetam  500 mg Oral BID    insulin lispro  0-4 Units SubCUTAneous TID WC    insulin lispro  0-4 Units SubCUTAneous Nightly    rivaroxaban  20 mg Oral Dinner    sodium chloride flush  5-40 mL IntraVENous 2 times per day     Continuous Infusions:   dextrose      sodium chloride       PRN Meds:.glucose, dextrose bolus **OR** dextrose bolus, glucagon (rDNA), dextrose, sodium chloride flush, sodium chloride, potassium chloride **OR** potassium alternative oral replacement **OR** potassium chloride, magnesium sulfate, ondansetron **OR** ondansetron, polyethylene glycol, acetaminophen **OR** acetaminophen, perflutren lipid microspheres  Continuous Infusions:   dextrose      sodium chloride         Intake/Output Summary (Last 24 hours) at 7/29/2024 0932  Last data filed at 7/29/2024 0425  Gross per 24 hour   Intake 1070 ml   Output --   Net 1070 ml       Lab Results   Component Value 
    V2.0    [facility] [place]      Name:  Kourtney Swann /Age/Sex: 1945  (79 y.o. female)   MRN & CSN:  [id number] & [id number] Encounter Date/Time: 2024 12:49 PM EDT   Location:  [id number] PCP: Wes Woodward     Attending:Usama Whalen MD       Hospital Day: 2    Assessment and Recommendations   Kourtney Swann is a 79 y.o. female with pmh of hypertension, hyperlipidemia, A-fib, seizures, diabetes who presents with Atypical chest pain  Patient's symptoms have been ongoing for the last month or so.  Patient has some shortness of breath weakness palpitation.  Patient contacted her cardiologist and they recommended that she comes to the hospital for further evaluation.    Patient was examined at this morning has no complaints and she is asymptomatic.      Plan:   Chest pain  Cardiology consulted and following.  EKG is sinus, Trope 21 and plateauing, patient will be scheduled for TTE and Lexiscan MPI, most likely on 2024      2.  Paroxysmal A-fib -patient is currently on flecainide, and atenolol continue her DOAC and patient is currently being monitored with telemetry.            Diet ADULT DIET; Regular; 5 carb choices (75 gm/meal)   DVT Prophylaxis [] Lovenox, []  Heparin, [] SCDs, [] Ambulation,  [] Eliquis, [x] Xarelto  [] Coumadin   Code Status Full Code   Disposition From: Home  Expected Disposition: Home  Estimated Date of Discharge: Upon clinical improvement  Patient requires continued admission due to upon clinical improvement   Surrogate Decision Maker/ POA       Personally reviewed Lab Studies and Imaging         Medical Decision Making:  The following items were considered in medical decision making:  Discussion of patient care with other providers  Reviewed clinical lab tests  Reviewed radiology tests  Reviewed other diagnostic tests/interventions  Independent review of radiologic images  Microbiology cultures and other micro tests reviewed      Subjective:     Chief Complaint: 
4 Eyes Skin Assessment     NAME:  Kourtney Swann  YOB: 1945  MEDICAL RECORD NUMBER:  [id number]    The patient is being assessed for  Admission    I agree that at least one RN has performed a thorough Head to Toe Skin Assessment on the patient. ALL assessment sites listed below have been assessed.      Areas assessed by both nurses:    Head, Face, Ears, Shoulders, Back, Chest, Arms, Elbows, Hands, Sacrum. Buttock, Coccyx, Ischium, Legs. Feet and Heels, and Under Medical Devices         Does the Patient have a Wound? No noted wound(s)       Yogesh Prevention initiated by RN: No  Wound Care Orders initiated by RN: No    Pressure Injury (Stage 3,4, Unstageable, DTI, NWPT, and Complex wounds) if present, place Wound referral order by RN under : No    New Ostomies, if present place, Ostomy referral order under : No     Nurse 1 eSignature: Electronically signed by Disha Mays RN on 7/27/24 at 6:24 PM EDT    **SHARE this note so that the co-signing nurse can place an eSignature**    Nurse 2 eSignature: Electronically signed by MELODIE HENDERSON RN on 7/28/24 at 1:26 AM EDT   
Called to speak to nurse about heart monitor for patient and discharge plan. Patient states she does not want heart monitor as she has worn them in the past. Patient states she will monitor things herself and declines monitor. Suzie BAILEY made aware.     GOLD Martin MSN, RN [facility]  [phone]     
Medication Reconciliation     List of medications patient is currently taking is complete.     Source of information: 1. Conversation with patient at bedside                                      2. EPIC records      Allergies  Patient has no known allergies.     Notes regarding home medications:  1. Patient states she has only taken 1 dose of both flecainide and levetiracetam prior to arrival in the ED  2. Pt anticoagulated with Xarelto 20 mg QD- last dose was last night    Kendy Haque, Pharmacy Intern  7/27/2024 3:36 PM     
Patient admitted to room 4116 from ED for chest pain, ambulated from stretcher to bed with stand-by assist, vitals stable on room air, pt denies any chest pain at the moment, tele monitor applied, pt in NSR, pt oriented to room and call light, encouraged to call for any needs, no other needs voiced by pt at this time, plan of care progressing.     Electronically signed by Disha Mays RN on 7/27/24 at 6:24 PM EDT   
Pt back from Endo. CMU box reapplied. All safety measures in place. Pt helped with ordering lunch.Denied pain and SOB.  
larynx and pharynx are unremarkable.  No acute abnormality of the salivary and thyroid glands. BONES: No acute osseous abnormality. CTA HEAD: ANTERIOR CIRCULATION: Focal moderate stenosis of the right supraclinoid ICA. Severe luminal narrowing throughout the left M2 superior division branch.  No significant stenosis of the intracranial internal carotid, anterior cerebral, or middle cerebral arteries. No aneurysm. POSTERIOR CIRCULATION: The right V4 segment effectively terminates as the PICA.  Moderate stenosis of the left mid V4 segment related to calcified plaque.  No significant stenosis of the basilar, or posterior cerebral arteries. No aneurysm. OTHER: No dural venous sinus thrombosis on this non-dedicated study. BRAIN: See concurrent head CT.     1. Severe luminal narrowing throughout the left M2 superior division branch. 2. Focal moderate stenosis of the right supraclinoid ICA. 3. Moderate stenosis of the left mid V4 segment related to calcified plaque. 4. No flow limiting stenosis within the neck. 5. No large vessel arterial occlusion within the head or neck.     CT HEAD WO CONTRAST    Addendum Date: 7/27/2024    ADDENDUM: The findings were relayed to Dr. Casper by the CORE team at 1:11 p.m. on 07/27/2024.     Result Date: 7/27/2024  EXAMINATION: CT OF THE HEAD WITHOUT CONTRAST  7/27/2024 12:37 pm TECHNIQUE: CT of the head was performed without the administration of intravenous contrast. Automated exposure control, iterative reconstruction, and/or weight based adjustment of the mA/kV was utilized to reduce the radiation dose to as low as reasonably achievable. COMPARISON: 11/23/2022 and 06/13/2022 HISTORY: ORDERING SYSTEM PROVIDED HISTORY: left sided numbness TECHNOLOGIST PROVIDED HISTORY: Reason for exam:->left sided numbness Has a \"code stroke\" or \"stroke alert\" been called?->Yes Decision Support Exception - unselect if not a suspected or confirmed emergency medical condition->Emergency Medical Condition

## 2024-10-02 ENCOUNTER — OFFICE VISIT (OUTPATIENT)
Dept: INTERNAL MEDICINE CLINIC | Age: 79
End: 2024-10-02
Payer: MEDICARE

## 2024-10-02 VITALS
OXYGEN SATURATION: 98 % | SYSTOLIC BLOOD PRESSURE: 130 MMHG | DIASTOLIC BLOOD PRESSURE: 70 MMHG | WEIGHT: 163 LBS | BODY MASS INDEX: 30.8 KG/M2 | HEART RATE: 63 BPM

## 2024-10-02 DIAGNOSIS — E11.9 TYPE 2 DIABETES MELLITUS WITHOUT COMPLICATION, WITHOUT LONG-TERM CURRENT USE OF INSULIN (HCC): ICD-10-CM

## 2024-10-02 DIAGNOSIS — I48.0 PAROXYSMAL ATRIAL FIBRILLATION (HCC): ICD-10-CM

## 2024-10-02 DIAGNOSIS — I10 PRIMARY HYPERTENSION: Primary | ICD-10-CM

## 2024-10-02 PROCEDURE — 1123F ACP DISCUSS/DSCN MKR DOCD: CPT | Performed by: STUDENT IN AN ORGANIZED HEALTH CARE EDUCATION/TRAINING PROGRAM

## 2024-10-02 PROCEDURE — 3078F DIAST BP <80 MM HG: CPT | Performed by: STUDENT IN AN ORGANIZED HEALTH CARE EDUCATION/TRAINING PROGRAM

## 2024-10-02 PROCEDURE — 3075F SYST BP GE 130 - 139MM HG: CPT | Performed by: STUDENT IN AN ORGANIZED HEALTH CARE EDUCATION/TRAINING PROGRAM

## 2024-10-02 PROCEDURE — 3044F HG A1C LEVEL LT 7.0%: CPT | Performed by: STUDENT IN AN ORGANIZED HEALTH CARE EDUCATION/TRAINING PROGRAM

## 2024-10-02 PROCEDURE — 99203 OFFICE O/P NEW LOW 30 MIN: CPT | Performed by: STUDENT IN AN ORGANIZED HEALTH CARE EDUCATION/TRAINING PROGRAM

## 2024-10-02 RX ORDER — LANCETS 30 GAUGE
1 EACH MISCELLANEOUS DAILY
Qty: 100 EACH | Refills: 5 | Status: SHIPPED | OUTPATIENT
Start: 2024-10-02

## 2024-10-02 RX ORDER — BLOOD-GLUCOSE METER
1 KIT MISCELLANEOUS DAILY
Qty: 1 KIT | Refills: 0 | Status: SHIPPED | OUTPATIENT
Start: 2024-10-02

## 2024-10-02 RX ORDER — GLUCOSAMINE HCL/CHONDROITIN SU 500-400 MG
CAPSULE ORAL
Qty: 100 STRIP | Refills: 3 | Status: SHIPPED | OUTPATIENT
Start: 2024-10-02

## 2024-10-02 SDOH — ECONOMIC STABILITY: FOOD INSECURITY: WITHIN THE PAST 12 MONTHS, THE FOOD YOU BOUGHT JUST DIDN'T LAST AND YOU DIDN'T HAVE MONEY TO GET MORE.: NEVER TRUE

## 2024-10-02 SDOH — ECONOMIC STABILITY: FOOD INSECURITY: WITHIN THE PAST 12 MONTHS, YOU WORRIED THAT YOUR FOOD WOULD RUN OUT BEFORE YOU GOT MONEY TO BUY MORE.: NEVER TRUE

## 2024-10-02 SDOH — ECONOMIC STABILITY: INCOME INSECURITY: HOW HARD IS IT FOR YOU TO PAY FOR THE VERY BASICS LIKE FOOD, HOUSING, MEDICAL CARE, AND HEATING?: NOT HARD AT ALL

## 2024-10-02 ASSESSMENT — PATIENT HEALTH QUESTIONNAIRE - PHQ9
2. FEELING DOWN, DEPRESSED OR HOPELESS: NOT AT ALL
SUM OF ALL RESPONSES TO PHQ9 QUESTIONS 1 & 2: 0
SUM OF ALL RESPONSES TO PHQ QUESTIONS 1-9: 0
1. LITTLE INTEREST OR PLEASURE IN DOING THINGS: NOT AT ALL
SUM OF ALL RESPONSES TO PHQ QUESTIONS 1-9: 0

## 2024-10-02 NOTE — ASSESSMENT & PLAN NOTE
A1c is well-controlled         Hemoglobin A1C   Date Value Ref Range Status   07/27/2024 6.0 See comment % Final     Comment:     Comment:  Diagnosis of Diabetes: > or = 6.5%  Increased risk of diabetes (Prediabetes): 5.7-6.4%  Glycemic Control: Nonpregnant Adults: <7.0%                    Pregnant: <6.0%

## 2024-10-02 NOTE — PROGRESS NOTES
Kourtney Swann (:  1945) is a 79 y.o. female,New patient, here for evaluation of the following chief complaint(s):  New Patient         Assessment & Plan  Primary hypertension  Blood pressure is well-controlled  -Continue amlodipine 10 mg p.o. daily atenolol 12.5 mg nightly, losartan-hydrochlorothiazide 100-25 mg p.o. daily       Paroxysmal atrial fibrillation (HCC)    Rivaroxaban for stroke prevention  Sinus rhythm today       Type 2 diabetes mellitus without complication, without long-term current use of insulin (HCC)    A1c is well-controlled         Hemoglobin A1C   Date Value Ref Range Status   2024 6.0 See comment % Final     Comment:     Comment:  Diagnosis of Diabetes: > or = 6.5%  Increased risk of diabetes (Prediabetes): 5.7-6.4%  Glycemic Control: Nonpregnant Adults: <7.0%                    Pregnant: <6.0%            No follow-ups on file.       Subjective   Kourtney Swann presents for new patient visit.  She feels well today          Review of Systems   Constitutional:  Negative for chills, fatigue and fever.   HENT:  Negative for congestion, ear pain and sore throat.    Respiratory:  Negative for cough and chest tightness.    Cardiovascular:  Negative for chest pain, palpitations and leg swelling.   Gastrointestinal:  Negative for abdominal pain, constipation, diarrhea, nausea and vomiting.   Genitourinary:  Negative for dysuria, flank pain and urgency.   Musculoskeletal:  Negative for arthralgias, back pain and joint swelling.   Skin:  Negative for rash.   Neurological:  Negative for dizziness, weakness and numbness.   Psychiatric/Behavioral:  Negative for sleep disturbance. The patient is not nervous/anxious.           Objective   Physical Exam  Constitutional:       General: She is not in acute distress.  HENT:      Mouth/Throat:      Mouth: Mucous membranes are moist.   Eyes:      General: No scleral icterus.     Pupils: Pupils are equal, round, and reactive to light.

## 2024-10-02 NOTE — ASSESSMENT & PLAN NOTE
Blood pressure is well-controlled  -Continue amlodipine 10 mg p.o. daily atenolol 12.5 mg nightly, losartan-hydrochlorothiazide 100-25 mg p.o. daily

## 2024-10-09 NOTE — ED NOTES
Dear Edith,    Thank you for visiting my office today and discussing your health concerns. Your commitment to improving your health and understanding the symptoms you've been experiencing is commendable.    Following our consultation, here are the key instructions and recommendations for your care plan:    - Tests and Monitoring:    - Stress Test: Scheduled to assess your heart function under physical stress.    - Blood Tests: To check your cholesterol and thyroid levels.    - Ultrasound of the Heart: To visualize the heart's structure and function.    - Heart Monitor: To continuously monitor your heart's activity over a period.    Please ensure to schedule these tests as soon as possible and follow up with me once the results are available. Your proactive approach to managing your health is crucial, and I am here to support you every step of the way.    Sincerely,    Dr. Todd Lion MD  Cardiology   Pt notified that she got a bed on 4th floor. Pt requested this RN to call her family friend Ning to notify. Ning notified at this time all questions answered.

## 2024-10-10 NOTE — PROGRESS NOTES
[facility]      Cardiology Progress Note    Kourtney Swann  1945    October 10, 2024        CC: \"I    HPI:  The patient is 79 y.o. female with a past medical history significant for anxiety, HTN, HLD, seizure, AFIB (2015), RBBB, chronic bifascicular block, DM, COPD, CAD, brain tumor  who was hospitalized last month and my partner Dr. LONDON Mauro was consulted. Presented palpitations and heart racing. Accompanied by SOB. Similar to her AFIB breakthrough events. Compliant on atenolol and flecainide. She has followed with Dr. Tam and [facility] since 2015.     7/22/24 , she is accompanied by family. She reports that starting mid-may of this year, she started with episodes of SOB, lightheaded, discomfort in chest, heart racing, weak bilateral arms and worsens with activity. Tingling left temple and radiates down face and  mouth. Can last 30-45 minutes. Walking older dog and will have to sit down. This has occurred 2-3 times during the night. ADL's are limiting due to symptoms. She does admit that her anxiety with these events. She does not drink ETOH or smoke cigarettes.     7/27/24-7/30/24 presented to the hospital with chest pain. Radiating to left arm and left temple. Vitals stable, troponin 21, EKG SR with frequent PVC's, CXR , CT head negative, CTA head/neck lluminal narrowing throughout left M2 branch, focal moderate stenosis R supraclinoid ICA with no flow limiting stenosis. completed. Echo and stress test completed 7/29/24 with no evidence of reversible ischemia    Past Medical History:   Diagnosis Date    Atrial fibrillation (HCC)     Brain tumor (benign) (HCC)     MRI every 6 months has swelling which causes the seizure    CAD (coronary artery disease)     COPD (chronic obstructive pulmonary disease) (HCC)     Patient denies    Diabetes mellitus (HCC)     Hyperlipidemia     Hypertension     Meningioma (HCC)     RBBB (right bundle branch block)     Seizure (HCC)     2019 and 6/2022

## 2024-10-17 ENCOUNTER — OFFICE VISIT (OUTPATIENT)
Dept: CARDIOLOGY CLINIC | Age: 79
End: 2024-10-17
Payer: MEDICARE

## 2024-10-17 VITALS
BODY MASS INDEX: 31.15 KG/M2 | DIASTOLIC BLOOD PRESSURE: 70 MMHG | OXYGEN SATURATION: 96 % | HEIGHT: 61 IN | SYSTOLIC BLOOD PRESSURE: 136 MMHG | HEART RATE: 66 BPM | WEIGHT: 165 LBS

## 2024-10-17 DIAGNOSIS — Z86.79 HISTORY OF RIGHT BUNDLE BRANCH BLOCK (RBBB): ICD-10-CM

## 2024-10-17 DIAGNOSIS — I10 ESSENTIAL HYPERTENSION: ICD-10-CM

## 2024-10-17 DIAGNOSIS — R00.2 PALPITATIONS: ICD-10-CM

## 2024-10-17 DIAGNOSIS — E78.5 HYPERLIPIDEMIA, UNSPECIFIED HYPERLIPIDEMIA TYPE: ICD-10-CM

## 2024-10-17 DIAGNOSIS — R07.9 CHEST PAIN, UNSPECIFIED TYPE: Primary | ICD-10-CM

## 2024-10-17 DIAGNOSIS — Z86.79 HISTORY OF ATRIAL FIBRILLATION: ICD-10-CM

## 2024-10-17 PROCEDURE — 3075F SYST BP GE 130 - 139MM HG: CPT | Performed by: INTERNAL MEDICINE

## 2024-10-17 PROCEDURE — 99214 OFFICE O/P EST MOD 30 MIN: CPT | Performed by: INTERNAL MEDICINE

## 2024-10-17 PROCEDURE — 1123F ACP DISCUSS/DSCN MKR DOCD: CPT | Performed by: INTERNAL MEDICINE

## 2024-10-17 PROCEDURE — 3078F DIAST BP <80 MM HG: CPT | Performed by: INTERNAL MEDICINE

## 2024-10-17 RX ORDER — FLECAINIDE ACETATE 100 MG/1
100 TABLET ORAL 2 TIMES DAILY
Qty: 90 TABLET | Refills: 3 | Status: SHIPPED | OUTPATIENT
Start: 2024-10-17

## 2024-10-17 NOTE — PROGRESS NOTES
[facility]      Cardiology Progress Note    Kourtney Swann  1945 October 17, 2024        CC: \"I feel my heart racing\"    HPI:  The patient is 79 y.o. female with a past medical history significant for anxiety, HTN, HLD, seizure, AFIB (2015), RBBB, chronic bifascicular block, DM, COPD, CAD, brain tumor  who was hospitalized last month and my partner Dr. LONDON Mauro was consulted. Presented palpitations and heart racing. Accompanied by SOB. Similar to her AFIB breakthrough events. Compliant on atenolol and flecainide. She has followed with Dr. Tam and [facility] Cariology since 2015.     7/22/24 , she is accompanied by family. She reports that starting mid-may of this year, she started with episodes of SOB, lightheaded, discomfort in chest, heart racing, weak bilateral arms and worsens with activity. Tingling left temple and radiates down face and  mouth. Can last 30-45 minutes. Walking older dog and will have to sit down. This has occurred 2-3 times during the night. ADL's are limiting due to symptoms. She does admit that her anxiety with these events. She does not drink ETOH or smoke cigarettes.     7/27/24-7/30/24 presented to the hospital with chest pain. Radiating to left arm and left temple. Vitals stable, troponin 21, EKG SR with frequent PVC's, CXR , CT head negative, CTA head/neck lluminal narrowing throughout left M2 branch, focal moderate stenosis R supraclinoid ICA with no flow limiting stenosis. completed. Echo and stress test completed 7/29/24 with no evidence of reversible ischemia    Patient here today for follow up. Reports overall she is feeling better. Denies any reoccurring episodes of chest pain. Reports episodes of feeling like her heart is racing, resolves spontaneously. Patient currently denies any weight gain, edema, palpitations, chest pain, shortness of breath, dizziness, and syncope on today's visit.       Past Medical History:   Diagnosis Date    Atrial fibrillation (HCC)

## 2024-11-16 ENCOUNTER — HOSPITAL ENCOUNTER (EMERGENCY)
Age: 79
Discharge: HOME | End: 2024-11-16
Attending: EMERGENCY MEDICINE
Payer: MEDICARE

## 2024-11-16 ENCOUNTER — APPOINTMENT (OUTPATIENT)
Dept: GENERAL RADIOLOGY | Age: 79
End: 2024-11-16
Payer: MEDICARE

## 2024-11-16 VITALS
BODY MASS INDEX: 31.72 KG/M2 | OXYGEN SATURATION: 100 % | TEMPERATURE: 97.4 F | DIASTOLIC BLOOD PRESSURE: 49 MMHG | RESPIRATION RATE: 19 BRPM | SYSTOLIC BLOOD PRESSURE: 117 MMHG | HEART RATE: 85 BPM | WEIGHT: 167.99 LBS | HEIGHT: 61 IN

## 2024-11-16 DIAGNOSIS — R06.00 DYSPNEA, UNSPECIFIED TYPE: Primary | ICD-10-CM

## 2024-11-16 DIAGNOSIS — F41.1 ANXIETY STATE: ICD-10-CM

## 2024-11-16 LAB
ALBUMIN SERPL-MCNC: 4.2 G/DL (ref 3.4–5)
ALBUMIN/GLOB SERPL: 1.1 {RATIO} (ref 1.1–2.2)
ALP SERPL-CCNC: 130 U/L (ref 40–129)
ALT SERPL-CCNC: 26 U/L (ref 10–40)
ANION GAP SERPL CALCULATED.3IONS-SCNC: 19 MMOL/L (ref 3–16)
AST SERPL-CCNC: 44 U/L (ref 15–37)
BASE EXCESS BLDV CALC-SCNC: 0.8 MMOL/L
BASOPHILS # BLD: 0.1 K/UL (ref 0–0.2)
BASOPHILS NFR BLD: 1.2 %
BILIRUB SERPL-MCNC: 0.3 MG/DL (ref 0–1)
BUN SERPL-MCNC: 27 MG/DL (ref 7–20)
CALCIUM SERPL-MCNC: 10.6 MG/DL (ref 8.3–10.6)
CHLORIDE SERPL-SCNC: 102 MMOL/L (ref 99–110)
CO2 BLDV-SCNC: 24 MMOL/L
CO2 SERPL-SCNC: 19 MMOL/L (ref 21–32)
COHGB MFR BLDV: 1.1 %
CREAT SERPL-MCNC: 1.2 MG/DL (ref 0.6–1.2)
DEPRECATED RDW RBC AUTO: 14.3 % (ref 12.4–15.4)
EOSINOPHIL # BLD: 0.3 K/UL (ref 0–0.6)
EOSINOPHIL NFR BLD: 3.2 %
GFR SERPLBLD CREATININE-BSD FMLA CKD-EPI: 45 ML/MIN/{1.73_M2}
GLUCOSE SERPL-MCNC: 103 MG/DL (ref 70–99)
HCO3 BLDV-SCNC: 23 MMOL/L (ref 23–29)
HCT VFR BLD AUTO: 34.2 % (ref 36–48)
HGB BLD-MCNC: 11.4 G/DL (ref 12–16)
LYMPHOCYTES # BLD: 2.2 K/UL (ref 1–5.1)
LYMPHOCYTES NFR BLD: 22.7 %
MCH RBC QN AUTO: 26.7 PG (ref 26–34)
MCHC RBC AUTO-ENTMCNC: 33.3 G/DL (ref 31–36)
MCV RBC AUTO: 80.4 FL (ref 80–100)
METHGB MFR BLDV: 0.6 %
MONOCYTES # BLD: 1.1 K/UL (ref 0–1.3)
MONOCYTES NFR BLD: 11.5 %
NEUTROPHILS # BLD: 6.1 K/UL (ref 1.7–7.7)
NEUTROPHILS NFR BLD: 61.4 %
NT-PROBNP SERPL-MCNC: 86 PG/ML (ref 0–449)
O2 THERAPY: ABNORMAL
PCO2 BLDV: 29.9 MMHG (ref 40–50)
PH BLDV: 7.5 [PH] (ref 7.35–7.45)
PLATELET # BLD AUTO: 426 K/UL (ref 135–450)
PMV BLD AUTO: 8.2 FL (ref 5–10.5)
PO2 BLDV: <30 MMHG
POTASSIUM SERPL-SCNC: 3.9 MMOL/L (ref 3.5–5.1)
PROT SERPL-MCNC: 8.1 G/DL (ref 6.4–8.2)
RBC # BLD AUTO: 4.25 M/UL (ref 4–5.2)
SAO2 % BLDV: 38 %
SODIUM SERPL-SCNC: 140 MMOL/L (ref 136–145)
TROPONIN, HIGH SENSITIVITY: 16 NG/L (ref 0–14)
TROPONIN, HIGH SENSITIVITY: 16 NG/L (ref 0–14)
WBC # BLD AUTO: 9.9 K/UL (ref 4–11)

## 2024-11-16 PROCEDURE — 99285 EMERGENCY DEPT VISIT HI MDM: CPT

## 2024-11-16 PROCEDURE — 80053 COMPREHEN METABOLIC PANEL: CPT

## 2024-11-16 PROCEDURE — 83880 ASSAY OF NATRIURETIC PEPTIDE: CPT

## 2024-11-16 PROCEDURE — 84484 ASSAY OF TROPONIN QUANT: CPT

## 2024-11-16 PROCEDURE — 93005 ELECTROCARDIOGRAM TRACING: CPT | Performed by: EMERGENCY MEDICINE

## 2024-11-16 PROCEDURE — 71045 X-RAY EXAM CHEST 1 VIEW: CPT

## 2024-11-16 PROCEDURE — 82803 BLOOD GASES ANY COMBINATION: CPT

## 2024-11-16 PROCEDURE — 85025 COMPLETE CBC W/AUTO DIFF WBC: CPT

## 2024-11-16 ASSESSMENT — PAIN SCALES - GENERAL: PAINLEVEL_OUTOF10: 0

## 2024-11-16 ASSESSMENT — PAIN - FUNCTIONAL ASSESSMENT
PAIN_FUNCTIONAL_ASSESSMENT: NONE - DENIES PAIN
PAIN_FUNCTIONAL_ASSESSMENT: 0-10

## 2024-11-16 NOTE — ED PROVIDER NOTES
[facility] EMERGENCY DEPARTMENT     EMERGENCY DEPARTMENT ENCOUNTER            Pt Name: Kourtney Swann   MRN: [id number]   Birthdate 1945   Date of evaluation: 11/16/2024   Provider: Alfredo Lu MD   PCP: Jasmeet Oliveira MD   Note Started: 5:37 PM EST 11/16/24          CHIEF COMPLAINT     Chief Complaint   Patient presents with    Chest Pain     Chest pressure since noon today, feels like boa constrictor wrapped around her chest.  Appears very anxious, MD at bedside.           HISTORY OF PRESENT ILLNESS:   History from : Patient and EMS   Limitations to history : Behavior     Kourtney Swann is a 79 y.o. female who  has a past medical history of Atrial fibrillation (HCC), Brain tumor (benign) (HCC), CAD (coronary artery disease), COPD (chronic obstructive pulmonary disease) (HCC), Diabetes mellitus (HCC), Hyperlipidemia, Hypertension, Meningioma (HCC), RBBB (right bundle branch block), and Seizure (HCC). presents to the emergency room complaining of chest tightness and shortness of breath that started around noon today.  Patient states she feels that she has something squeezing her chest.  Denies any actual chest pain.  Patient is extremely anxious on arrival and hyperventilating.  Was able to coax the patient into breathing slower and then she was able to provide history.  States that she started to feel some chest tightness around noon.  States she laid down to take a nap and then when she woke up she was feeling slightly better.  States that she was sitting in her recliner and started to feel anxious and chest tightness again and a gradual got worse.  States she called her neighbor who came over and her neighbor was concerned by the way she was breathing and therefore called 911.  EMS states that the patient has been extremely anxious and route to the hospital.  Patient denies any fevers.  No cough.  No vomiting.  Normal urinary and bowel habits.    Patient states she knows she is  MSM FORMULA) TABS    Take 1 tablet by mouth daily    MULTIPLE VITAMINS-MINERALS (THERAPEUTIC MULTIVITAMIN-MINERALS) TABLET    Take 1 tablet by mouth daily    RIVAROXABAN (XARELTO) 20 MG TABS TABLET    Take 1 tablet by mouth Daily with supper    VITAMIN D (CHOLECALCIFEROL) 25 MCG (1000 UT) TABS TABLET    Take 1 tablet by mouth daily      SCREENINGS          Alice Coma Scale  Eye Opening: Spontaneous  Best Verbal Response: Oriented  Best Motor Response: Obeys commands  Alice Coma Scale Score: 15                CIWA Assessment  BP: (!) 128/53  Pulse: 85                  PHYSICAL EXAM :  Vitals:    11/16/24 1736 11/16/24 1738 11/16/24 1844 11/16/24 1930   BP: (!) 160/86  (!) 130/45 (!) 128/53   Pulse: 97  96 85   Resp: (!) 34  15 20   Temp: 97.4 °F (36.3 °C)      TempSrc: Oral      SpO2: 100%  100% 100%   Weight:  76.2 kg (167 lb 15.9 oz)     Height:  1.549 m (5' 1\")         GENERAL APPEARANCE: Awake and alert. Cooperative.  Anxious and hyperventilating  HEAD: Normocephalic. Atraumatic.  EYES: PERRL. EOM's grossly intact.   ENT: Mucous membranes are moist.   NECK: Supple, trachea midline.   HEART: RRR. Normal S1, S2. No murmurs, rubs or gallops.    LUNGS: Respirations unlabored. CTAB. Good air exchange. No wheezes, rales, or rhonchi.  Tachypnea present and patient hyperventilating  ABDOMEN: Soft. Non-distended. Non-tender. No guarding or rebound. Normal Bowel sounds.  EXTREMITIES: No peripheral edema. MAEE. No acute deformities.  SKIN: Warm and dry. No acute rashes.   NEUROLOGICAL: Alert and oriented X 3. CN II-XII intact. No gross facial drooping.  Strength 5/5 in all extremities.  Sensation intact.  GCS 15, no dysarthria, no aphasia, negative test of skew      DIAGNOSTIC RESULTS     LABS:   Labs Reviewed   CBC WITH AUTO DIFFERENTIAL - Abnormal; Notable for the following components:       Result Value    Hemoglobin 11.4 (*)     Hematocrit 34.2 (*)     All other components within normal limits   COMPREHENSIVE

## 2024-11-17 LAB
EKG ATRIAL RATE: 85 BPM
EKG DIAGNOSIS: NORMAL
EKG P AXIS: 53 DEGREES
EKG P-R INTERVAL: 178 MS
EKG Q-T INTERVAL: 454 MS
EKG QRS DURATION: 176 MS
EKG QTC CALCULATION (BAZETT): 540 MS
EKG R AXIS: -71 DEGREES
EKG T AXIS: 43 DEGREES
EKG VENTRICULAR RATE: 85 BPM

## 2024-11-17 PROCEDURE — 93010 ELECTROCARDIOGRAM REPORT: CPT | Performed by: INTERNAL MEDICINE

## 2024-11-17 NOTE — DISCHARGE INSTRUCTIONS
Thank you for visiting [facility] Emergency Department.    You need to call in morning to make appointment as directed with appropriate doctor.    Should you have any questions regarding your care or further treatment, please call [facility] Emergency Department at [phone].    Take any medications as prescribed, if given any, otherwise for pain Use ibuprofen or Tylenol (unless prescribed medications that have Tylenol in it).  You can take over the counter Ibuprofen (advil) tablets (4 tablets every 8 hours or 3 tablets every 6 hours or 2 tablets every 4 hours)    Return to ED if symptoms worsen, do not improve, fever > 101.5, excessive nausea or vomiting, and unable to follow up with your physician, or any other care or concern.

## 2024-11-19 RX ORDER — AMLODIPINE BESYLATE 10 MG/1
10 TABLET ORAL DAILY
Qty: 90 TABLET | Refills: 0 | Status: SHIPPED | OUTPATIENT
Start: 2024-11-19 | End: 2025-02-17

## 2024-11-19 RX ORDER — LOSARTAN POTASSIUM AND HYDROCHLOROTHIAZIDE 25; 100 MG/1; MG/1
1 TABLET ORAL DAILY
Qty: 90 TABLET | Refills: 0 | Status: SHIPPED | OUTPATIENT
Start: 2024-11-19 | End: 2025-02-17

## 2024-11-19 RX ORDER — METFORMIN HYDROCHLORIDE 500 MG/1
1500 TABLET, EXTENDED RELEASE ORAL
Qty: 270 TABLET | Refills: 0 | Status: SHIPPED | OUTPATIENT
Start: 2024-11-19 | End: 2025-02-17

## 2024-11-21 ENCOUNTER — OFFICE VISIT (OUTPATIENT)
Dept: CARDIOLOGY CLINIC | Age: 79
End: 2024-11-21

## 2024-11-21 VITALS
HEART RATE: 66 BPM | BODY MASS INDEX: 31.74 KG/M2 | SYSTOLIC BLOOD PRESSURE: 138 MMHG | DIASTOLIC BLOOD PRESSURE: 84 MMHG | WEIGHT: 168 LBS | OXYGEN SATURATION: 98 %

## 2024-11-21 DIAGNOSIS — I48.0 PAROXYSMAL ATRIAL FIBRILLATION (HCC): Primary | ICD-10-CM

## 2024-11-21 DIAGNOSIS — R00.2 PALPITATIONS: ICD-10-CM

## 2024-11-21 DIAGNOSIS — I10 PRIMARY HYPERTENSION: ICD-10-CM

## 2024-11-21 DIAGNOSIS — R07.9 CHEST PAIN, UNSPECIFIED TYPE: ICD-10-CM

## 2024-11-21 NOTE — PROGRESS NOTES
[facility]   Electrophysiology Consultation     Date: 11/21/2024  Reason for Consultation: Atrial fibrillation.   Consult Requesting Physician: Kevon Meneses MD     CC: Discuss atrial fibrillation      HPI: Kourtney Swann is a 79 y.o. female with a history of anxiety, hypertension, iron deficiency anemia, hyperlipidemia, seizures, right bundle branch block and left anterior fascicular block, type 2 diabetes, coronary artery disease and atrial fibrillation that was first diagnosed in July 2015, previously on flecainide, cardioversions, recently seen in cardiology office in October 2024 where she complained of palpitations, flecainide was increased to 100 mg twice daily.    Patient presents to the office today for the evaluation and management of atrial fibrillation.  She has been having short episodes of racing heart, lasting minutes in duration.  She reports that when she was raking leaves she had chest pressure which she described as a sensation of something sitting on her chest, this does not always occur with heart racing sensation.    Review of System:  [x] Full ROS obtained and negative except as mentioned in HPI or below      Prior to Admission medications    Medication Sig Start Date End Date Taking? Authorizing Provider   amLODIPine (NORVASC) 10 MG tablet Take 1 tablet by mouth daily 11/19/24 2/17/25 Yes Jasmeet Oliveira MD   losartan-hydroCHLOROthiazide (HYZAAR) 100-25 MG per tablet Take 1 tablet by mouth daily 11/19/24 2/17/25 Yes Jasmeet Oliveira MD   metFORMIN (GLUCOPHAGE-XR) 500 MG extended release tablet Take 3 tablets by mouth daily (with breakfast) 11/19/24 2/17/25 Yes Jasmeet Oliveira MD   flecainide (TAMBOCOR) 100 MG tablet Take 1 tablet by mouth 2 times daily 10/17/24  Yes Kevon Meneses MD   glucose monitoring kit 1 kit by Does not apply route daily 10/2/24  Yes Jasmeet Oliveira MD   blood glucose monitor strips Test 1 times a day 10/2/24  Yes Jasmeet Oliveira MD   Lancets MISC 1

## 2024-11-25 ENCOUNTER — TELEPHONE (OUTPATIENT)
Dept: CARDIOLOGY CLINIC | Age: 79
End: 2024-11-25

## 2024-11-25 ENCOUNTER — HOSPITAL ENCOUNTER (INPATIENT)
Age: 79
LOS: 4 days | Discharge: HOME | DRG: 309 | End: 2024-11-29
Attending: EMERGENCY MEDICINE | Admitting: INTERNAL MEDICINE
Payer: MEDICARE

## 2024-11-25 ENCOUNTER — APPOINTMENT (OUTPATIENT)
Dept: GENERAL RADIOLOGY | Age: 79
DRG: 309 | End: 2024-11-25
Payer: MEDICARE

## 2024-11-25 DIAGNOSIS — D50.9 IRON DEFICIENCY ANEMIA, UNSPECIFIED IRON DEFICIENCY ANEMIA TYPE: ICD-10-CM

## 2024-11-25 DIAGNOSIS — R06.02 SHORTNESS OF BREATH: ICD-10-CM

## 2024-11-25 DIAGNOSIS — I48.91 ATRIAL FIBRILLATION WITH RAPID VENTRICULAR RESPONSE (HCC): Primary | ICD-10-CM

## 2024-11-25 DIAGNOSIS — F41.1 ANXIETY STATE: ICD-10-CM

## 2024-11-25 DIAGNOSIS — R06.4 HYPERVENTILATION: ICD-10-CM

## 2024-11-25 DIAGNOSIS — R79.89 ELEVATED TROPONIN: ICD-10-CM

## 2024-11-25 PROBLEM — R07.89 ATYPICAL CHEST PAIN: Status: RESOLVED | Noted: 2024-07-27 | Resolved: 2024-11-25

## 2024-11-25 PROBLEM — G93.40 ENCEPHALOPATHY ACUTE: Status: RESOLVED | Noted: 2022-06-14 | Resolved: 2024-11-25

## 2024-11-25 PROBLEM — R00.2 PALPITATIONS: Status: RESOLVED | Noted: 2024-06-19 | Resolved: 2024-11-25

## 2024-11-25 LAB
ALBUMIN SERPL-MCNC: 3.7 G/DL (ref 3.4–5)
ALBUMIN/GLOB SERPL: 1.1 {RATIO} (ref 1.1–2.2)
ALP SERPL-CCNC: 109 U/L (ref 40–129)
ALT SERPL-CCNC: 25 U/L (ref 10–40)
ANION GAP SERPL CALCULATED.3IONS-SCNC: 17 MMOL/L (ref 3–16)
AST SERPL-CCNC: 42 U/L (ref 15–37)
BASE EXCESS BLDV CALC-SCNC: -0.4 MMOL/L
BASE EXCESS BLDV CALC-SCNC: 0.9 MMOL/L
BASOPHILS # BLD: 0.1 K/UL (ref 0–0.2)
BASOPHILS NFR BLD: 1.3 %
BILIRUB SERPL-MCNC: 0.4 MG/DL (ref 0–1)
BUN SERPL-MCNC: 25 MG/DL (ref 7–20)
CALCIUM SERPL-MCNC: 10.5 MG/DL (ref 8.3–10.6)
CHLORIDE SERPL-SCNC: 104 MMOL/L (ref 99–110)
CO2 BLDV-SCNC: 20 MMOL/L
CO2 BLDV-SCNC: 26 MMOL/L
CO2 SERPL-SCNC: 16 MMOL/L (ref 21–32)
COHGB MFR BLDV: 1 %
COHGB MFR BLDV: 1.1 %
CREAT SERPL-MCNC: 1 MG/DL (ref 0.6–1.2)
DEPRECATED RDW RBC AUTO: 14.7 % (ref 12.4–15.4)
EKG ATRIAL RATE: 88 BPM
EKG DIAGNOSIS: NORMAL
EKG P AXIS: 45 DEGREES
EKG P-R INTERVAL: 134 MS
EKG Q-T INTERVAL: 398 MS
EKG QRS DURATION: 142 MS
EKG QTC CALCULATION (BAZETT): 481 MS
EKG R AXIS: -80 DEGREES
EKG T AXIS: 30 DEGREES
EKG VENTRICULAR RATE: 88 BPM
EOSINOPHIL # BLD: 0.3 K/UL (ref 0–0.6)
EOSINOPHIL NFR BLD: 3.1 %
GFR SERPLBLD CREATININE-BSD FMLA CKD-EPI: 56 ML/MIN/{1.73_M2}
GLUCOSE BLD-MCNC: 205 MG/DL (ref 70–99)
GLUCOSE SERPL-MCNC: 104 MG/DL (ref 70–99)
HCO3 BLDV-SCNC: 20 MMOL/L (ref 23–29)
HCO3 BLDV-SCNC: 25 MMOL/L (ref 23–29)
HCT VFR BLD AUTO: 34.4 % (ref 36–48)
HGB BLD-MCNC: 11.3 G/DL (ref 12–16)
LYMPHOCYTES # BLD: 2.5 K/UL (ref 1–5.1)
LYMPHOCYTES NFR BLD: 27.5 %
MAGNESIUM SERPL-MCNC: 1.84 MG/DL (ref 1.8–2.4)
MCH RBC QN AUTO: 26.1 PG (ref 26–34)
MCHC RBC AUTO-ENTMCNC: 33 G/DL (ref 31–36)
MCV RBC AUTO: 79.1 FL (ref 80–100)
METHGB MFR BLDV: 0.3 %
METHGB MFR BLDV: 0.5 %
MONOCYTES # BLD: 1.2 K/UL (ref 0–1.3)
MONOCYTES NFR BLD: 12.9 %
NEUTROPHILS # BLD: 5 K/UL (ref 1.7–7.7)
NEUTROPHILS NFR BLD: 55.2 %
NT-PROBNP SERPL-MCNC: 957 PG/ML (ref 0–449)
O2 THERAPY: ABNORMAL
O2 THERAPY: ABNORMAL
PCO2 BLDV: 20.6 MMHG (ref 40–50)
PCO2 BLDV: 36.6 MMHG (ref 40–50)
PERFORMED ON: ABNORMAL
PH BLDV: 7.44 [PH] (ref 7.35–7.45)
PH BLDV: 7.59 [PH] (ref 7.35–7.45)
PLATELET # BLD AUTO: 451 K/UL (ref 135–450)
PMV BLD AUTO: 8.5 FL (ref 5–10.5)
PO2 BLDV: 30 MMHG
PO2 BLDV: <30 MMHG
POTASSIUM SERPL-SCNC: 3.7 MMOL/L (ref 3.5–5.1)
PROT SERPL-MCNC: 7.2 G/DL (ref 6.4–8.2)
RBC # BLD AUTO: 4.34 M/UL (ref 4–5.2)
SAO2 % BLDV: 39 %
SAO2 % BLDV: 68 %
SODIUM SERPL-SCNC: 137 MMOL/L (ref 136–145)
TROPONIN, HIGH SENSITIVITY: 25 NG/L (ref 0–14)
TROPONIN, HIGH SENSITIVITY: 28 NG/L (ref 0–14)
TROPONIN, HIGH SENSITIVITY: 29 NG/L (ref 0–14)
WBC # BLD AUTO: 9.1 K/UL (ref 4–11)

## 2024-11-25 PROCEDURE — 80053 COMPREHEN METABOLIC PANEL: CPT

## 2024-11-25 PROCEDURE — 83880 ASSAY OF NATRIURETIC PEPTIDE: CPT

## 2024-11-25 PROCEDURE — 36415 COLL VENOUS BLD VENIPUNCTURE: CPT

## 2024-11-25 PROCEDURE — 93010 ELECTROCARDIOGRAM REPORT: CPT | Performed by: INTERNAL MEDICINE

## 2024-11-25 PROCEDURE — 93005 ELECTROCARDIOGRAM TRACING: CPT | Performed by: EMERGENCY MEDICINE

## 2024-11-25 PROCEDURE — 2580000003 HC RX 258: Performed by: INTERNAL MEDICINE

## 2024-11-25 PROCEDURE — 83735 ASSAY OF MAGNESIUM: CPT

## 2024-11-25 PROCEDURE — 85025 COMPLETE CBC W/AUTO DIFF WBC: CPT

## 2024-11-25 PROCEDURE — 84484 ASSAY OF TROPONIN QUANT: CPT

## 2024-11-25 PROCEDURE — 2060000000 HC ICU INTERMEDIATE R&B

## 2024-11-25 PROCEDURE — 6370000000 HC RX 637 (ALT 250 FOR IP): Performed by: INTERNAL MEDICINE

## 2024-11-25 PROCEDURE — 82803 BLOOD GASES ANY COMBINATION: CPT

## 2024-11-25 PROCEDURE — 99285 EMERGENCY DEPT VISIT HI MDM: CPT

## 2024-11-25 PROCEDURE — 71045 X-RAY EXAM CHEST 1 VIEW: CPT

## 2024-11-25 RX ORDER — ONDANSETRON 2 MG/ML
4 INJECTION INTRAMUSCULAR; INTRAVENOUS EVERY 6 HOURS PRN
Status: DISCONTINUED | OUTPATIENT
Start: 2024-11-25 | End: 2024-11-29 | Stop reason: HOSPADM

## 2024-11-25 RX ORDER — GLUCAGON 1 MG/ML
1 KIT INJECTION PRN
Status: DISCONTINUED | OUTPATIENT
Start: 2024-11-25 | End: 2024-11-29 | Stop reason: HOSPADM

## 2024-11-25 RX ORDER — SODIUM CHLORIDE 0.9 % (FLUSH) 0.9 %
5-40 SYRINGE (ML) INJECTION PRN
Status: DISCONTINUED | OUTPATIENT
Start: 2024-11-25 | End: 2024-11-29 | Stop reason: HOSPADM

## 2024-11-25 RX ORDER — SODIUM CHLORIDE 9 MG/ML
INJECTION, SOLUTION INTRAVENOUS PRN
Status: DISCONTINUED | OUTPATIENT
Start: 2024-11-25 | End: 2024-11-29 | Stop reason: HOSPADM

## 2024-11-25 RX ORDER — DEXTROSE MONOHYDRATE 100 MG/ML
INJECTION, SOLUTION INTRAVENOUS CONTINUOUS PRN
Status: DISCONTINUED | OUTPATIENT
Start: 2024-11-25 | End: 2024-11-29 | Stop reason: HOSPADM

## 2024-11-25 RX ORDER — ATORVASTATIN CALCIUM 80 MG/1
80 TABLET, FILM COATED ORAL DAILY
Status: DISCONTINUED | OUTPATIENT
Start: 2024-11-26 | End: 2024-11-29 | Stop reason: HOSPADM

## 2024-11-25 RX ORDER — INSULIN LISPRO 100 [IU]/ML
0-8 INJECTION, SOLUTION INTRAVENOUS; SUBCUTANEOUS
Status: DISCONTINUED | OUTPATIENT
Start: 2024-11-25 | End: 2024-11-29 | Stop reason: HOSPADM

## 2024-11-25 RX ORDER — ACETAMINOPHEN 650 MG/1
650 SUPPOSITORY RECTAL EVERY 6 HOURS PRN
Status: DISCONTINUED | OUTPATIENT
Start: 2024-11-25 | End: 2024-11-29 | Stop reason: HOSPADM

## 2024-11-25 RX ORDER — SODIUM CHLORIDE 0.9 % (FLUSH) 0.9 %
5-40 SYRINGE (ML) INJECTION EVERY 12 HOURS SCHEDULED
Status: DISCONTINUED | OUTPATIENT
Start: 2024-11-25 | End: 2024-11-29 | Stop reason: HOSPADM

## 2024-11-25 RX ORDER — MAGNESIUM SULFATE IN WATER 40 MG/ML
2000 INJECTION, SOLUTION INTRAVENOUS PRN
Status: DISCONTINUED | OUTPATIENT
Start: 2024-11-25 | End: 2024-11-29 | Stop reason: HOSPADM

## 2024-11-25 RX ORDER — LEVETIRACETAM 500 MG/1
500 TABLET ORAL 2 TIMES DAILY
Status: DISCONTINUED | OUTPATIENT
Start: 2024-11-25 | End: 2024-11-29 | Stop reason: HOSPADM

## 2024-11-25 RX ORDER — POTASSIUM CHLORIDE 7.45 MG/ML
10 INJECTION INTRAVENOUS PRN
Status: DISCONTINUED | OUTPATIENT
Start: 2024-11-25 | End: 2024-11-29 | Stop reason: HOSPADM

## 2024-11-25 RX ORDER — LORAZEPAM 2 MG/ML
0.5 INJECTION INTRAMUSCULAR ONCE
Status: DISCONTINUED | OUTPATIENT
Start: 2024-11-25 | End: 2024-11-25

## 2024-11-25 RX ORDER — ACETAMINOPHEN 325 MG/1
650 TABLET ORAL EVERY 6 HOURS PRN
Status: DISCONTINUED | OUTPATIENT
Start: 2024-11-25 | End: 2024-11-29 | Stop reason: HOSPADM

## 2024-11-25 RX ORDER — ONDANSETRON 4 MG/1
4 TABLET, ORALLY DISINTEGRATING ORAL EVERY 8 HOURS PRN
Status: DISCONTINUED | OUTPATIENT
Start: 2024-11-25 | End: 2024-11-29 | Stop reason: HOSPADM

## 2024-11-25 RX ORDER — POTASSIUM CHLORIDE 1500 MG/1
40 TABLET, EXTENDED RELEASE ORAL PRN
Status: DISCONTINUED | OUTPATIENT
Start: 2024-11-25 | End: 2024-11-29 | Stop reason: HOSPADM

## 2024-11-25 RX ORDER — POLYETHYLENE GLYCOL 3350 17 G/17G
17 POWDER, FOR SOLUTION ORAL DAILY PRN
Status: DISCONTINUED | OUTPATIENT
Start: 2024-11-25 | End: 2024-11-29 | Stop reason: HOSPADM

## 2024-11-25 RX ORDER — METOPROLOL TARTRATE 25 MG/1
25 TABLET, FILM COATED ORAL 2 TIMES DAILY
Status: DISCONTINUED | OUTPATIENT
Start: 2024-11-25 | End: 2024-11-29 | Stop reason: HOSPADM

## 2024-11-25 RX ADMIN — INSULIN LISPRO 2 UNITS: 100 INJECTION, SOLUTION INTRAVENOUS; SUBCUTANEOUS at 22:00

## 2024-11-25 RX ADMIN — RIVAROXABAN 20 MG: 20 TABLET, FILM COATED ORAL at 21:57

## 2024-11-25 RX ADMIN — METOPROLOL TARTRATE 25 MG: 25 TABLET, FILM COATED ORAL at 21:57

## 2024-11-25 RX ADMIN — LEVETIRACETAM 500 MG: 500 TABLET, FILM COATED ORAL at 21:57

## 2024-11-25 RX ADMIN — SODIUM CHLORIDE, PRESERVATIVE FREE 10 ML: 5 INJECTION INTRAVENOUS at 22:02

## 2024-11-25 ASSESSMENT — PAIN DESCRIPTION - ORIENTATION: ORIENTATION: LEFT

## 2024-11-25 ASSESSMENT — PAIN - FUNCTIONAL ASSESSMENT: PAIN_FUNCTIONAL_ASSESSMENT: 0-10

## 2024-11-25 ASSESSMENT — LIFESTYLE VARIABLES
HOW OFTEN DO YOU HAVE A DRINK CONTAINING ALCOHOL: NEVER
HOW MANY STANDARD DRINKS CONTAINING ALCOHOL DO YOU HAVE ON A TYPICAL DAY: PATIENT DOES NOT DRINK
HOW MANY STANDARD DRINKS CONTAINING ALCOHOL DO YOU HAVE ON A TYPICAL DAY: PATIENT DOES NOT DRINK
HOW OFTEN DO YOU HAVE A DRINK CONTAINING ALCOHOL: NEVER

## 2024-11-25 ASSESSMENT — PAIN DESCRIPTION - DESCRIPTORS: DESCRIPTORS: DISCOMFORT;HEAVINESS

## 2024-11-25 ASSESSMENT — PAIN DESCRIPTION - LOCATION: LOCATION: CHEST

## 2024-11-25 ASSESSMENT — PAIN SCALES - GENERAL: PAINLEVEL_OUTOF10: 2

## 2024-11-25 NOTE — ED PROVIDER NOTES
had a hysterectomy.    PAST MEDICAL HISTORY  Past Medical History:   Diagnosis Date    Atrial fibrillation (HCC)     Brain tumor (benign) (HCC)     MRI every 6 months has swelling which causes the seizure    CAD (coronary artery disease)     COPD (chronic obstructive pulmonary disease) (HCC)     Patient denies    Diabetes mellitus (HCC)     Hyperlipidemia     Hypertension     Meningioma (HCC)     RBBB (right bundle branch block)     Seizure (HCC)     2019 and 6/2022       FAMILY HISTORY  History reviewed. No pertinent family history.    SOCIAL HISTORY   reports that she has never smoked. She has never used smokeless tobacco. She reports that she does not drink alcohol and does not use drugs.    SURGICAL HISTORY  Past Surgical History:   Procedure Laterality Date    CARDIOVERSION      HYSTERECTOMY (CERVIX STATUS UNKNOWN)      INTRACAPSULAR CATARACT EXTRACTION Left 8/8/2022    PHACOEMULSIFICATION WITH INTRAOCULAR LENS IMPLANT - LEFT EYE performed by Jose Luis Platt MD at [facility]    INTRACAPSULAR CATARACT EXTRACTION Right 8/22/2022    PHACOEMULSIFICATION WITH INTRAOCULAR LENS IMPLANT - RIGHT EYE performed by Jose Luis Platt MD at [facility]    TONSILLECTOMY         CURRENT MEDICATIONS  Current Outpatient Rx   Medication Sig Dispense Refill    amLODIPine (NORVASC) 10 MG tablet Take 1 tablet by mouth daily 90 tablet 0    losartan-hydroCHLOROthiazide (HYZAAR) 100-25 MG per tablet Take 1 tablet by mouth daily 90 tablet 0    metFORMIN (GLUCOPHAGE-XR) 500 MG extended release tablet Take 3 tablets by mouth daily (with breakfast) 270 tablet 0    glucose monitoring kit 1 kit by Does not apply route daily 1 kit 0    blood glucose monitor strips Test 1 times a day 100 strip 3    Lancets MISC 1 each by Does not apply route daily 100 each 5    levETIRAcetam (KEPPRA) 500 MG tablet Take 1 tablet by mouth 2 times daily 60 tablet 1    atenolol (TENORMIN) 25 MG tablet Take 0.5 tablets by mouth at bedtime

## 2024-11-25 NOTE — TELEPHONE ENCOUNTER
Received event report from  dated 11/24/24, 10:49 pm. Report captured new onset afib with RVR at  bpm. RVR lasting 32 minutes and 33 seconds at 167-198 bpm. Report scanned to PT's chart. Do we have any recs for this PT?

## 2024-11-25 NOTE — TELEPHONE ENCOUNTER
Checked monitor in real time, patient Heart rate, 190, monitor showed Afib, patient extremely short of breath, told to go to the ER now. Patient was agreeable.

## 2024-11-25 NOTE — TELEPHONE ENCOUNTER
EP/Afib//Palpitations/Fast Heart Rate:    What symptoms are you having?   PATIENT RELAYS HAVING ALL SYMPTOMS LISTED.  General Fatigue?  Rapid or irregular heartbeat?  Fluttering or (thumping\" in the chest?   Dizziness?  Concerned you may pass out?  Shortness of breath with normal activity?  Weakness?  Faintness or confusion?  Fatigue with exercising?  Sweating?    When did your symptoms start and what were you doing at the time? 11/23 SATURDAY INTO TODAY       Are your symptoms constant or do they come and go?  COMES AND GOES    Does anything make it better or worse?  REST, BUT STATES THAT WHEN SHE TRIES TO SLOW HER HR SHE FEELS FAINT    Have you had these symptoms before?  YES, CURRENTLY WEARING MONITOR    Can you tell me what your heart rate is?   What is your BP?    Have you taken any medication for this today?   If so what?    Any recent medication changes?  STOPPED FLECAINIDE AT LAST OV

## 2024-11-26 PROBLEM — R79.89 ELEVATED TROPONIN: Status: ACTIVE | Noted: 2024-11-26

## 2024-11-26 LAB
ANION GAP SERPL CALCULATED.3IONS-SCNC: 15 MMOL/L (ref 3–16)
BASOPHILS # BLD: 0.1 K/UL (ref 0–0.2)
BASOPHILS NFR BLD: 1 %
BUN SERPL-MCNC: 28 MG/DL (ref 7–20)
CALCIUM SERPL-MCNC: 9.7 MG/DL (ref 8.3–10.6)
CHLORIDE SERPL-SCNC: 105 MMOL/L (ref 99–110)
CO2 SERPL-SCNC: 21 MMOL/L (ref 21–32)
CREAT SERPL-MCNC: 1.1 MG/DL (ref 0.6–1.2)
DEPRECATED RDW RBC AUTO: 14.6 % (ref 12.4–15.4)
EOSINOPHIL # BLD: 0.3 K/UL (ref 0–0.6)
EOSINOPHIL NFR BLD: 4.5 %
FERRITIN SERPL IA-MCNC: 11.9 NG/ML (ref 15–150)
GFR SERPLBLD CREATININE-BSD FMLA CKD-EPI: 50 ML/MIN/{1.73_M2}
GLUCOSE BLD-MCNC: 100 MG/DL (ref 70–99)
GLUCOSE BLD-MCNC: 108 MG/DL (ref 70–99)
GLUCOSE BLD-MCNC: 133 MG/DL (ref 70–99)
GLUCOSE BLD-MCNC: 157 MG/DL (ref 70–99)
GLUCOSE SERPL-MCNC: 89 MG/DL (ref 70–99)
HCT VFR BLD AUTO: 30.4 % (ref 36–48)
HEMOCCULT SP1 STL QL: ABNORMAL
HEMOCCULT SP2 STL QL: NORMAL
HGB BLD-MCNC: 10.4 G/DL (ref 12–16)
IRON SATN MFR SERPL: 5 % (ref 15–50)
IRON SERPL-MCNC: 17 UG/DL (ref 37–145)
LYMPHOCYTES # BLD: 2 K/UL (ref 1–5.1)
LYMPHOCYTES NFR BLD: 26.5 %
MCH RBC QN AUTO: 26.5 PG (ref 26–34)
MCHC RBC AUTO-ENTMCNC: 34 G/DL (ref 31–36)
MCV RBC AUTO: 78 FL (ref 80–100)
MONOCYTES # BLD: 1 K/UL (ref 0–1.3)
MONOCYTES NFR BLD: 12.6 %
NEUTROPHILS # BLD: 4.2 K/UL (ref 1.7–7.7)
NEUTROPHILS NFR BLD: 55.4 %
PERFORMED ON: ABNORMAL
PLATELET # BLD AUTO: 391 K/UL (ref 135–450)
PMV BLD AUTO: 8.3 FL (ref 5–10.5)
POTASSIUM SERPL-SCNC: 3.6 MMOL/L (ref 3.5–5.1)
RBC # BLD AUTO: 3.9 M/UL (ref 4–5.2)
SODIUM SERPL-SCNC: 141 MMOL/L (ref 136–145)
TIBC SERPL-MCNC: 359 UG/DL (ref 260–445)
TROPONIN, HIGH SENSITIVITY: 23 NG/L (ref 0–14)
TROPONIN, HIGH SENSITIVITY: 26 NG/L (ref 0–14)
TSH SERPL DL<=0.005 MIU/L-ACNC: 3.14 UIU/ML (ref 0.27–4.2)
WBC # BLD AUTO: 7.6 K/UL (ref 4–11)

## 2024-11-26 PROCEDURE — 82728 ASSAY OF FERRITIN: CPT

## 2024-11-26 PROCEDURE — 97161 PT EVAL LOW COMPLEX 20 MIN: CPT | Performed by: PHYSICAL THERAPIST

## 2024-11-26 PROCEDURE — 36415 COLL VENOUS BLD VENIPUNCTURE: CPT

## 2024-11-26 PROCEDURE — 82270 OCCULT BLOOD FECES: CPT

## 2024-11-26 PROCEDURE — 6370000000 HC RX 637 (ALT 250 FOR IP): Performed by: INTERNAL MEDICINE

## 2024-11-26 PROCEDURE — 97535 SELF CARE MNGMENT TRAINING: CPT

## 2024-11-26 PROCEDURE — 94760 N-INVAS EAR/PLS OXIMETRY 1: CPT

## 2024-11-26 PROCEDURE — 97116 GAIT TRAINING THERAPY: CPT | Performed by: PHYSICAL THERAPIST

## 2024-11-26 PROCEDURE — 80048 BASIC METABOLIC PNL TOTAL CA: CPT

## 2024-11-26 PROCEDURE — 84443 ASSAY THYROID STIM HORMONE: CPT

## 2024-11-26 PROCEDURE — 2060000000 HC ICU INTERMEDIATE R&B

## 2024-11-26 PROCEDURE — 85025 COMPLETE CBC W/AUTO DIFF WBC: CPT

## 2024-11-26 PROCEDURE — 83550 IRON BINDING TEST: CPT

## 2024-11-26 PROCEDURE — 97530 THERAPEUTIC ACTIVITIES: CPT | Performed by: PHYSICAL THERAPIST

## 2024-11-26 PROCEDURE — 97530 THERAPEUTIC ACTIVITIES: CPT

## 2024-11-26 PROCEDURE — 99223 1ST HOSP IP/OBS HIGH 75: CPT | Performed by: INTERNAL MEDICINE

## 2024-11-26 PROCEDURE — 84484 ASSAY OF TROPONIN QUANT: CPT

## 2024-11-26 PROCEDURE — 99232 SBSQ HOSP IP/OBS MODERATE 35: CPT | Performed by: INTERNAL MEDICINE

## 2024-11-26 PROCEDURE — 97165 OT EVAL LOW COMPLEX 30 MIN: CPT

## 2024-11-26 PROCEDURE — 83540 ASSAY OF IRON: CPT

## 2024-11-26 RX ORDER — AMIODARONE HYDROCHLORIDE 200 MG/1
200 TABLET ORAL DAILY
Status: DISCONTINUED | OUTPATIENT
Start: 2024-11-26 | End: 2024-11-26 | Stop reason: SDUPTHER

## 2024-11-26 RX ORDER — AMIODARONE HYDROCHLORIDE 200 MG/1
200 TABLET ORAL 2 TIMES DAILY
Status: DISCONTINUED | OUTPATIENT
Start: 2024-11-29 | End: 2024-11-29 | Stop reason: HOSPADM

## 2024-11-26 RX ORDER — AMIODARONE HYDROCHLORIDE 200 MG/1
200 TABLET ORAL DAILY
Status: DISCONTINUED | OUTPATIENT
Start: 2024-12-06 | End: 2024-11-29 | Stop reason: HOSPADM

## 2024-11-26 RX ORDER — AMIODARONE HYDROCHLORIDE 200 MG/1
400 TABLET ORAL 2 TIMES DAILY
Status: COMPLETED | OUTPATIENT
Start: 2024-11-26 | End: 2024-11-28

## 2024-11-26 RX ADMIN — AMIODARONE HYDROCHLORIDE 400 MG: 200 TABLET ORAL at 21:00

## 2024-11-26 RX ADMIN — METOPROLOL TARTRATE 25 MG: 25 TABLET, FILM COATED ORAL at 09:06

## 2024-11-26 RX ADMIN — LEVETIRACETAM 500 MG: 500 TABLET, FILM COATED ORAL at 21:00

## 2024-11-26 RX ADMIN — METOPROLOL TARTRATE 25 MG: 25 TABLET, FILM COATED ORAL at 21:00

## 2024-11-26 RX ADMIN — AMIODARONE HYDROCHLORIDE 400 MG: 200 TABLET ORAL at 11:59

## 2024-11-26 RX ADMIN — ATORVASTATIN CALCIUM 80 MG: 80 TABLET, FILM COATED ORAL at 09:06

## 2024-11-26 RX ADMIN — LEVETIRACETAM 500 MG: 500 TABLET, FILM COATED ORAL at 09:06

## 2024-11-26 RX ADMIN — RIVAROXABAN 20 MG: 20 TABLET, FILM COATED ORAL at 17:37

## 2024-11-26 NOTE — PROGRESS NOTES
Patient up and walking the halls at this time. Patient remains with RBBB and HR varying between 70-86 while walking. 400 mg of Amiodarone given before patient walking.

## 2024-11-26 NOTE — PROGRESS NOTES
Physical Therapy  Facility/Department: [address] PROGRESSIVE CARE  Physical Therapy Initial Assessment    Name: Kourtney Swann  : 1945  MRN: [id number]  Date of Service: 2024    Discharge Recommendations:  Home with assist PRN   PT Equipment Recommendations  Equipment Needed: No      Kourtney Swann scored a 20/24 on the AM-PAC short mobility form. Current research shows that an AM-PAC score of 18 or greater is typically associated with a discharge to the patient's home setting. At this time, pt will be safe to return home without any further therapy.  Please see assessment section for further patient specific details.    If patient discharges prior to next session this note will serve as a discharge summary.  Please see below for the latest assessment towards goals.       Patient Diagnosis(es): The primary encounter diagnosis was Atrial fibrillation with rapid ventricular response (HCC). Diagnoses of Shortness of breath, Elevated troponin, Hyperventilation, and Anxiety state were also pertinent to this visit.  Past Medical History:  has a past medical history of Atrial fibrillation (HCC), Brain tumor (benign) (HCC), CAD (coronary artery disease), Diabetes mellitus (HCC), Hyperlipidemia, Hypertension, Meningioma (HCC), RBBB (right bundle branch block), and Seizure (HCC).  Past Surgical History:  has a past surgical history that includes Hysterectomy; Cardioversion; Tonsillectomy; Intracapsular cataract extraction (Left, 2022); and Intracapsular cataract extraction (Right, 2022).    Assessment  Assessment: pt is an 80 yo female who was adm to hosp with SOB; pt currently appears to be close to her baseline; recommend return home with PRN assist; pt does not want any home therapy  Therapy Prognosis: Good  Decision Making: Low Complexity  Requires PT Follow-Up: Yes  Activity Tolerance  Activity Tolerance: Patient tolerated treatment well    Plan  Physical Therapy Plan  General Plan:  (1-2 more  Little  How much help is needed climbing 3-5 steps with a railing?: A Little  AM-PAC Inpatient Mobility Raw Score : 20  AM-PAC Inpatient T-Scale Score : 47.67  Mobility Inpatient CMS 0-100% Score: 35.83  Mobility Inpatient CMS G-Code Modifier : CJ       Goals  Short Term Goals  Time Frame for Short Term Goals: by discharge  Short Term Goal 1: transfers MI  Short Term Goal 2: amb 100' without AD SBA/sup  Patient Goals   Patient Goals : to retunr home       Education  Patient Education  Education Given To: Patient  Education Provided: Role of Therapy  Education Provided Comments: pt does not think she will need any further therapy at discharge  Education Method: Verbal  Education Outcome: Verbalized understanding      Therapy Time   Individual Concurrent Group Co-treatment   Time In 0910         Time Out 0957         Minutes 47                 VICKY JAIN PT  Electronically signed by VICKY JAIN PT on 11/26/2024 at 9:58 AM

## 2024-11-26 NOTE — PROGRESS NOTES
[facility] Note      Chief Complaint: I feel ok    Subjective/Interval History:    Patient resting comfortably in bed.  No problems overnight.  No shortness of breath, no further atrial fibrillation.  Telemetry remains in sinus rhythm.  Patient denies chest pain overnight.  Had some chest pressure yesterday with elevated blood pressure.    Discussed with nursing, no new concerns overnight.    No cough or sputum. No nausea, vomiting, diarrhea. No abdominal pain. No dysuria.  The remainder of the review of systems is negative.     PMH, PSH, FH/SH reviewed and unchanged as documented in the H&P personally documented at admission 24    Medication list reviewed    Objective:    /60   Pulse 80   Temp 97.7 °F (36.5 °C) (Oral)   Resp 19   Ht 1.524 m (5')   Wt 72 kg (158 lb 11.7 oz)   SpO2 97%   BMI 31.00 kg/m²   Temp  Av.8 °F (36.6 °C)  Min: 97.5 °F (36.4 °C)  Max: 98.2 °F (36.8 °C)    RRR.  Telemetry with sinus rhythm  Pulm-chest is clear throughout.  No wheezing or rhonchi or crackles noted.  Abd- soft, NTND  Ext- no edema    The Following Labs Were Reviewed Today:     Recent Results (from the past 24 hour(s))   EKG 12 Lead    Collection Time: 24 12:46 PM   Result Value Ref Range    Ventricular Rate 88 BPM    Atrial Rate 88 BPM    P-R Interval 134 ms    QRS Duration 142 ms    Q-T Interval 398 ms    QTc Calculation (Bazett) 481 ms    P Axis 45 degrees    R Axis -80 degrees    T Axis 30 degrees    Diagnosis       Normal sinus rhythmRight bundle branch blockLeft anterior fascicular blockPremature atrial complexesConfirmed by VELASQUEZ VIGIL ([id number]) on 2024 3:01:21 PM   CBC with Auto Differential    Collection Time: 24 12:58 PM   Result Value Ref Range    WBC 9.1 4.0 - 11.0 K/uL    RBC 4.34 4.00 - 5.20 M/uL    Hemoglobin 11.3 (L) 12.0 - 16.0 g/dL    Hematocrit 34.4 (L) 36.0 - 48.0 %    MCV 79.1 (L) 80.0 - 100.0 fL    MCH 26.1 26.0 - 34.0 pg    MCHC 33.0 31.0 - 36.0

## 2024-11-26 NOTE — CONSULTS
Referring Physician: * No referring provider recorded for this case *  Reason for Consultation: Recurrent symptomatic atrial fibrillation with RVR  Chief Complaint: I felt my heart racing and I felt extremely dizzy      Subjective:   History of Present Illness:  Kourtney Swann is a 79 y.o. patient with prior history of paroxysmal atrial fibrillation, diabetes, hypertension, hyperlipidemia, with coronary artery disease who presented to the hospital with complaints of shortness of breath, heart palpitation or dizziness.  She has a prior history of paroxysmal atrial fibrillation and was recently seen by EP service.  Her flecainide was discontinued due to her underlying conduction abnormalities and she was placed on extended monitor.  Over the weekend she started complaining of heart racing which was associated with shortness of breath dizziness and she had hard time moving.  She eventually decided to come to the hospital.  She has remained in sinus rhythm since the admission.    I have been asked to provide consultation regarding further management and testing.    Review of Systems:   All 12 point review of symptoms completed. Pertinent positives identified in the HPI, all other review of symptoms negative as below.    Past Medical History:   has a past medical history of Atrial fibrillation (HCC), Brain tumor (benign) (HCC), CAD (coronary artery disease), Diabetes mellitus (HCC), Hyperlipidemia, Hypertension, Meningioma (HCC), RBBB (right bundle branch block), and Seizure (HCC).    Surgical History:   has a past surgical history that includes Hysterectomy; Cardioversion; Tonsillectomy; Intracapsular cataract extraction (Left, 8/8/2022); and Intracapsular cataract extraction (Right, 8/22/2022).     Social History:   reports that she has never smoked. She has never used smokeless tobacco. She reports that she does not drink alcohol and does not use drugs.     Family History:  family history includes Asthma in her

## 2024-11-26 NOTE — PROGRESS NOTES
4 Eyes Skin Assessment     NAME:  Kourtney Swann  YOB: 1945  MEDICAL RECORD NUMBER:  [id number]    The patient is being assessed for  Admission    I agree that at least one RN has performed a thorough Head to Toe Skin Assessment on the patient. ALL assessment sites listed below have been assessed.      Areas assessed by both nurses:    Head, Face, Ears, Shoulders, Back, Chest, Arms, Elbows, Hands, Sacrum. Buttock, Coccyx, Ischium, and Legs. Feet and Heels        Does the Patient have a Wound? No noted wound(s)       Yogesh Prevention initiated by RN: No  Wound Care Orders initiated by RN: No    Pressure Injury (Stage 3,4, Unstageable, DTI, NWPT, and Complex wounds) if present, place Wound referral order by RN under : No    New Ostomies, if present place, Ostomy referral order under : No     Nurse 1 eSignature: Electronically signed by Bel Mcfadden RN on 11/26/24 at 12:18 AM EST    **SHARE this note so that the co-signing nurse can place an eSignature**    Nurse 2 eSignature: Electronically signed by Manuela Perez RN on 11/26/24 at 5:19 AM EST

## 2024-11-26 NOTE — ED NOTES
ED SBAR report provider to 5W floor , RN. Patient to be transported to Room 5118 via stretcher by transport tech.Patient transported with bedside cardiac monitor. IV site clean, dry, and intact. MEWS score and pain assessed as 2/10  and documented. Updated patient on plan of care.

## 2024-11-26 NOTE — CARE COORDINATION
Case Management Assessment  Initial Evaluation    Date/Time of Evaluation: 11/26/2024 11:22 AM  Assessment Completed by: Rayna Tineo RN    If patient is discharged prior to next notation, then this note serves as note for discharge by case management.    Patient Name: Kourtney Swann                   YOB: 1945  Diagnosis: Hyperventilation [R06.4]  Shortness of breath [R06.02]  Anxiety state [F41.1]  Elevated troponin [R79.89]  Atrial fibrillation with rapid ventricular response (HCC) [I48.91]  Atrial fibrillation with RVR (HCC) [I48.91]                   Date / Time: 11/25/2024 12:26 PM    Patient Admission Status: Inpatient   Readmission Risk (Low < 19, Mod (19-27), High > 27): Readmission Risk Score: 12.2    Current PCP: Jasmeet Oliveira MD  PCP verified by CM? Yes    Chart Reviewed: Yes      History Provided by: Patient  Patient Orientation: Alert and Oriented    Patient Cognition: Alert    Hospitalization in the last 30 days (Readmission):  No    If yes, Readmission Assessment in  Navigator will be completed.    Advance Directives:      Code Status: Full Code   Patient's Primary Decision Maker is: Legal Next of Kin      Discharge Planning:    Patient lives with: Alone Type of Home: House  Primary Care Giver: Self  Patient Support Systems include: Friends/Neighbors   Current Financial resources: Medicare  Current community resources: None  Current services prior to admission: Durable Medical Equipment            Current DME: Wheelchair, Walker (has doesn't always need)            Type of Home Care services:  None    ADLS  Prior functional level: Independent in ADLs/IADLs  Current functional level: Independent in ADLs/IADLs    PT AM-PAC: 20 /24  OT AM-PAC: 21 /24    Family can provide assistance at DC: Yes (friends)  Would you like Case Management to discuss the discharge plan with any other family members/significant others, and if so, who? No  Plans to Return to Present Housing: Yes  Other  Identified Issues/Barriers to RETURNING to current housing: None  Potential Assistance needed at discharge: N/A            Potential DME:  None  Patient expects to discharge to: House  Plan for transportation at discharge: Friends    Financial    Payor: [employer] / Plan: [employer] MEDIBLUE ESSENTIAL/PLUS / Product Type: *No Product type* /     Does insurance require precert for SNF: Yes    Potential assistance Purchasing Medications: No  Meds-to-Beds request:        [facility] [place] [place] - [address] - P [phone] - F [phone]  [address]  [facility] [address]  Phone: [phone] Fax: [phone]      Notes:    Factors facilitating achievement of predicted outcomes: Friend support, Motivated, Cooperative, Pleasant, Sense of humor, and Good insight into deficits    Barriers to discharge: Decreased endurance and Long standing deficits    Additional Case Management Notes:   DC plan to return home alone with friend support.  Friend will transport. Denied discharge needs.  Watching cardiology recs.    The Plan for Transition of Care is related to the following treatment goals of Hyperventilation [R06.4]  Shortness of breath [R06.02]  Anxiety state [F41.1]  Elevated troponin [R79.89]  Atrial fibrillation with rapid ventricular response (HCC) [I48.91]  Atrial fibrillation with RVR (HCC) [I48.91]    IF APPLICABLE: The Patient and/or patient representative Kourtney and her family were provided with a choice of provider and agrees with the discharge plan. Freedom of choice list with basic dialogue that supports the patient's individualized plan of care/goals and shares the quality data associated with the providers was provided to: Patient       The Patient and/or Patient Representative Agree with the Discharge Plan? Yes    Rayna Tineo RN  Case Management Department  Ph: [phone]

## 2024-11-26 NOTE — PROGRESS NOTES
Occupational Therapy  Facility/Department: [address] PROGRESSIVE CARE  Occupational Therapy Initial Assessment    Name: Kourtney Swann  : 1945  MRN: [id number]  Date of Service: 2024    Discharge Recommendations:  Home with assist PRN  OT Equipment Recommendations  Equipment Needed: No       Kourtney Swann scored a 21/24 on the AM-[facility] ADL Inpatient form.  At this time, no further OT is recommended upon discharge due to pt appears to be functioning close to baseline and already owns needed DME.  Recommend patient returns to prior setting with prior services.       Patient Diagnosis(es): The primary encounter diagnosis was Atrial fibrillation with rapid ventricular response (HCC). Diagnoses of Shortness of breath, Elevated troponin, Hyperventilation, and Anxiety state were also pertinent to this visit.  Past Medical History:  has a past medical history of Atrial fibrillation (HCC), Brain tumor (benign) (HCC), CAD (coronary artery disease), Diabetes mellitus (HCC), Hyperlipidemia, Hypertension, Meningioma (HCC), RBBB (right bundle branch block), and Seizure (HCC).  Past Surgical History:  has a past surgical history that includes Hysterectomy; Cardioversion; Tonsillectomy; Intracapsular cataract extraction (Left, 2022); and Intracapsular cataract extraction (Right, 2022).    Treatment Diagnosis: A-fib w/RVR      Assessment  Performance deficits / Impairments: Decreased functional mobility ;Decreased ADL status;Decreased strength;Decreased high-level IADLs;Decreased balance;Decreased endurance  Assessment: Pt is an 78 y/o female w/PMHx: A-fib, CAD, HTN, diabetes, hyperlipidemia admitted d/t SOB/chest pain and A-fib w/RVR. PTA- pt lives at home alone, Independent w/all ADL/IADL and fxl transfer/mobility tasks w/o AD. Pt is still an active . Today- pt completed LB dressing, toileting tasks, and fxl transfers/mobility w/Supervision-SBA w/o AD. Anticipate pt will require Supervison-SBA w/LB

## 2024-11-26 NOTE — PROGRESS NOTES
Pharmacy Medication Reconciliation Note     List of medications patient is currently taking is complete.    Source of information:   1. Patient  2. EMR    Notes regarding home medications:   1. Patient reports she took amlodipine, losartan/HCTZ, and Keppra this morning. All other home medications were last taken yesterday.        Sirena Diamond, Pharmacy Intern  11/25/2024 8:37 PM

## 2024-11-26 NOTE — PROGRESS NOTES
Patient doing well.  Discussed with nursing.  Patient unable to get a ride home tonight, will discharge in morning if stable.  Electronically signed by ELVIS ARELLANO MD on 11/26/2024 at 5:24 PM

## 2024-11-26 NOTE — PLAN OF CARE
Problem: Chronic Conditions and Co-morbidities  Goal: Patient's chronic conditions and co-morbidity symptoms are monitored and maintained or improved  11/26/2024 0916 by Saritha Landa, RN  Outcome: Progressing     Problem: Discharge Planning  Goal: Discharge to home or other facility with appropriate resources  11/26/2024 0916 by Saritha Landa, RN  Outcome: Progressing     Problem: Pain  Goal: Verbalizes/displays adequate comfort level or baseline comfort level  11/26/2024 0916 by Saritha Landa, RN  Outcome: Progressing     Problem: ABCDS Injury Assessment  Goal: Absence of physical injury  Outcome: Progressing

## 2024-11-26 NOTE — PROGRESS NOTES
Patient is up walking the halls. She is tolerating well. Hr in 90s. Patient states if she discharges once it is dark, she will not have a way home and will prefer to stay until morning.

## 2024-11-27 LAB
ANION GAP SERPL CALCULATED.3IONS-SCNC: 11 MMOL/L (ref 3–16)
BASOPHILS # BLD: 0.1 K/UL (ref 0–0.2)
BASOPHILS NFR BLD: 1.3 %
BUN SERPL-MCNC: 22 MG/DL (ref 7–20)
CALCIUM SERPL-MCNC: 9.4 MG/DL (ref 8.3–10.6)
CHLORIDE SERPL-SCNC: 108 MMOL/L (ref 99–110)
CO2 SERPL-SCNC: 23 MMOL/L (ref 21–32)
CREAT SERPL-MCNC: 0.9 MG/DL (ref 0.6–1.2)
DEPRECATED RDW RBC AUTO: 14.7 % (ref 12.4–15.4)
EOSINOPHIL # BLD: 0.5 K/UL (ref 0–0.6)
EOSINOPHIL NFR BLD: 6.1 %
GFR SERPLBLD CREATININE-BSD FMLA CKD-EPI: 64 ML/MIN/{1.73_M2}
GLUCOSE BLD-MCNC: 119 MG/DL (ref 70–99)
GLUCOSE BLD-MCNC: 134 MG/DL (ref 70–99)
GLUCOSE BLD-MCNC: 150 MG/DL (ref 70–99)
GLUCOSE BLD-MCNC: 157 MG/DL (ref 70–99)
GLUCOSE BLD-MCNC: 188 MG/DL (ref 70–99)
GLUCOSE SERPL-MCNC: 96 MG/DL (ref 70–99)
HCT VFR BLD AUTO: 30 % (ref 36–48)
HGB BLD-MCNC: 10 G/DL (ref 12–16)
LYMPHOCYTES # BLD: 2 K/UL (ref 1–5.1)
LYMPHOCYTES NFR BLD: 23.8 %
MCH RBC QN AUTO: 26.4 PG (ref 26–34)
MCHC RBC AUTO-ENTMCNC: 33.5 G/DL (ref 31–36)
MCV RBC AUTO: 79 FL (ref 80–100)
MONOCYTES # BLD: 1 K/UL (ref 0–1.3)
MONOCYTES NFR BLD: 12.3 %
NEUTROPHILS # BLD: 4.7 K/UL (ref 1.7–7.7)
NEUTROPHILS NFR BLD: 56.5 %
PERFORMED ON: ABNORMAL
PLATELET # BLD AUTO: 362 K/UL (ref 135–450)
PMV BLD AUTO: 8.2 FL (ref 5–10.5)
POTASSIUM SERPL-SCNC: 3.5 MMOL/L (ref 3.5–5.1)
RBC # BLD AUTO: 3.79 M/UL (ref 4–5.2)
SODIUM SERPL-SCNC: 142 MMOL/L (ref 136–145)
WBC # BLD AUTO: 8.4 K/UL (ref 4–11)

## 2024-11-27 PROCEDURE — 94760 N-INVAS EAR/PLS OXIMETRY 1: CPT

## 2024-11-27 PROCEDURE — 80048 BASIC METABOLIC PNL TOTAL CA: CPT

## 2024-11-27 PROCEDURE — 6370000000 HC RX 637 (ALT 250 FOR IP): Performed by: INTERNAL MEDICINE

## 2024-11-27 PROCEDURE — 97116 GAIT TRAINING THERAPY: CPT | Performed by: PHYSICAL THERAPIST

## 2024-11-27 PROCEDURE — 2060000000 HC ICU INTERMEDIATE R&B

## 2024-11-27 PROCEDURE — 36415 COLL VENOUS BLD VENIPUNCTURE: CPT

## 2024-11-27 PROCEDURE — 97530 THERAPEUTIC ACTIVITIES: CPT

## 2024-11-27 PROCEDURE — 2580000003 HC RX 258: Performed by: INTERNAL MEDICINE

## 2024-11-27 PROCEDURE — 99232 SBSQ HOSP IP/OBS MODERATE 35: CPT | Performed by: INTERNAL MEDICINE

## 2024-11-27 PROCEDURE — 85025 COMPLETE CBC W/AUTO DIFF WBC: CPT

## 2024-11-27 RX ORDER — PANTOPRAZOLE SODIUM 40 MG/1
40 TABLET, DELAYED RELEASE ORAL
Status: DISCONTINUED | OUTPATIENT
Start: 2024-11-28 | End: 2024-11-29 | Stop reason: HOSPADM

## 2024-11-27 RX ADMIN — SODIUM CHLORIDE, PRESERVATIVE FREE 10 ML: 5 INJECTION INTRAVENOUS at 21:21

## 2024-11-27 RX ADMIN — AMIODARONE HYDROCHLORIDE 400 MG: 200 TABLET ORAL at 09:29

## 2024-11-27 RX ADMIN — LEVETIRACETAM 500 MG: 500 TABLET, FILM COATED ORAL at 09:29

## 2024-11-27 RX ADMIN — AMIODARONE HYDROCHLORIDE 400 MG: 200 TABLET ORAL at 21:15

## 2024-11-27 RX ADMIN — LEVETIRACETAM 500 MG: 500 TABLET, FILM COATED ORAL at 21:15

## 2024-11-27 RX ADMIN — METOPROLOL TARTRATE 25 MG: 25 TABLET, FILM COATED ORAL at 21:15

## 2024-11-27 RX ADMIN — ATORVASTATIN CALCIUM 80 MG: 80 TABLET, FILM COATED ORAL at 09:29

## 2024-11-27 RX ADMIN — METOPROLOL TARTRATE 25 MG: 25 TABLET, FILM COATED ORAL at 09:29

## 2024-11-27 NOTE — PROGRESS NOTES
Patient had BM this evening. Blood occult stool sample obtained. To the eye it looks like trace bright red blood was in stool. Patient states she has hemorrhoids. She states she \"sometimes see blood with bowel movement when hemorrhoids are active\" Sample sent to lab.

## 2024-11-27 NOTE — PROGRESS NOTES
Occupational Therapy  Facility/Department: [address] PROGRESSIVE CARE  Occupational Therapy Daily Treatment    Name: Kourtney Swann  : 1945  MRN: [id number]  Date of Service: 2024    Discharge Recommendations:  Home with assist PRN  OT Equipment Recommendations  Equipment Needed: No    Kourtney Swann scored a 21/24 on the AM-[facility] ADL Inpatient form.  At this time, no further OT is recommended upon discharge due to pt functioning near baseline, home w/PRN assist.  Recommend patient returns to prior setting with prior services.          Patient Diagnosis(es): The primary encounter diagnosis was Atrial fibrillation with rapid ventricular response (HCC). Diagnoses of Shortness of breath, Elevated troponin, Hyperventilation, and Anxiety state were also pertinent to this visit.  Past Medical History:  has a past medical history of Atrial fibrillation (HCC), Brain tumor (benign) (HCC), CAD (coronary artery disease), Diabetes mellitus (HCC), Hyperlipidemia, Hypertension, Meningioma (HCC), RBBB (right bundle branch block), and Seizure (HCC).  Past Surgical History:  has a past surgical history that includes Hysterectomy; Cardioversion; Tonsillectomy; Intracapsular cataract extraction (Left, 2022); and Intracapsular cataract extraction (Right, 2022).    Treatment Diagnosis: A-fib w/RVR      Assessment  Performance deficits / Impairments: Decreased functional mobility ;Decreased ADL status;Decreased strength;Decreased high-level IADLs;Decreased balance;Decreased endurance  Assessment: Pt is an 80 y/o female w/PMHx: A-fib, CAD, HTN, diabetes, hyperlipidemia admitted d/t SOB/chest pain and A-fib w/RVR. PTA- pt lives at home alone, Independent w/all ADL/IADL and fxl transfer/mobility tasks w/o AD. Pt is still an active . Today- pt demonstrating increased functional activity tolerance at this date. Pt completed fxl transfers and mobility within household distance, including toilet transfer w/o AD  Equipment: Cane, Wheelchair - Manual, Walker - Rolling  Has the patient had two or more falls in the past year or any fall with injury in the past year?: No  ADL Assistance: Independent  Homemaking Assistance: Independent  Homemaking Responsibilities: Yes  Ambulation Assistance: Independent  Transfer Assistance: Independent  Active : Yes  Mode of Transportation: Car  Occupation: Retired  Type of Occupation:   Leisure & Hobbies: work out in yard and walk dog    Objective     Safety Devices  Type of Devices: Left in chair;Nurse notified;Gait belt     Toilet Transfers  Toilet - Technique: Ambulating  Equipment Used: Standard toilet  Toilet Transfer: Supervision     ADL  Toileting: Supervision  Toileting Skilled Clinical Factors: Supervision for simulated toileting tasks at [facility]  Functional Mobility: Supervision  Functional Mobility Skilled Clinical Factors: Pt ambulated <> bathroom and in hallway within household distances w/o AD w/Supervision  Additional Comments: Anticipate pt will require Supervison-SBA w/LB dressing and bathing, Mod I w/UB dressing and bathing while seated based on balance and strength observed     Activity Tolerance  Activity Tolerance: Patient tolerated treatment well  Bed mobility  Bed Mobility Comments: pt up in chair at start/EOS  Transfers  Sit to stand: Supervision  Stand to sit: Supervision    Vision  Vision: Within Functional Limits  Hearing  Hearing: Within functional limits    Cognition  Overall Cognitive Status: WNL  Orientation  Overall Orientation Status: Within Normal Limits  Orientation Level: Oriented X4                  Education Given To: Patient  Education Provided: Role of Therapy;Plan of Care;Precautions;Transfer Training;Mobility Training;Fall Prevention Strategies  Education Method: Verbal  Barriers to Learning: None          AM-PAC - ADL  AM-PAC Daily Activity - Inpatient   How much help is needed for putting on and taking off regular lower body clothing?:

## 2024-11-27 NOTE — PLAN OF CARE
Problem: Chronic Conditions and Co-morbidities  Goal: Patient's chronic conditions and co-morbidity symptoms are monitored and maintained or improved  11/27/2024 1200 by Paula Clayton RN  Outcome: Progressing     Problem: Discharge Planning  Goal: Discharge to home or other facility with appropriate resources  11/27/2024 1200 by Paula Clayton RN  Outcome: Progressing     Problem: Pain  Goal: Verbalizes/displays adequate comfort level or baseline comfort level  11/27/2024 1200 by Paula Clayton RN  Outcome: Progressing     Problem: ABCDS Injury Assessment  Goal: Absence of physical injury  11/27/2024 1200 by Paula Clayton RN  Outcome: Progressing

## 2024-11-27 NOTE — PROGRESS NOTES
Patient will remain in hospital for procedure on Friday with GI. She is to remain 48 hours without her xarelto. Patient can have reg diet for breakfast 11/28 but afterwards will need to be on a clear liquid diet for Friday. Protonix will be started. IV placed. Patient remains in NSR with RBBB. Walking hallways independently.

## 2024-11-27 NOTE — PROGRESS NOTES
[facility] Note      Chief Complaint: I feel ok    Subjective/Interval History:    Patient with an uneventful night.  No palpitations.  No shortness of breath or chest pain.  Telemetry is remained with sinus rhythm.    Hemoglobin down this morning.  Iron studies show iron deficiency and stool guaiac positive x 2.  Nursing reports brown stool with some blood on the outside of the stool.  Patient reports problems with hemorrhoids in the past.  Patient denies abdominal pain, no melena.  No hematemesis.    Discussed with nursing, no new concerns overnight.    No cough or sputum. No nausea, vomiting, diarrhea. No abdominal pain. No dysuria.  The remainder of the review of systems is negative.     PMH, PSH, FH/SH reviewed and unchanged as documented in the H&P personally documented at admission 24    Medication list reviewed    Objective:    BP (!) 136/51   Pulse 82   Temp 97.5 °F (36.4 °C) (Oral)   Resp 16   Ht 1.524 m (5')   Wt 72.1 kg (158 lb 15.2 oz)   SpO2 96%   BMI 31.04 kg/m²   Temp  Av.7 °F (36.5 °C)  Min: 97.5 °F (36.4 °C)  Max: 98.2 °F (36.8 °C)    RRR.  Telemetry with sinus rhythm  Pulm-chest is clear throughout.  No wheezing or rhonchi or crackles noted.  Abd- soft, NTND.  Benign abdominal exam  Ext- no edema    The Following Labs Were Reviewed Today:     Recent Results (from the past 24 hour(s))   POCT Glucose    Collection Time: 24 12:52 PM   Result Value Ref Range    POC Glucose 157 (H) 70 - 99 mg/dl    Performed on ACCU-CHEK    POCT Glucose    Collection Time: 24  5:25 PM   Result Value Ref Range    POC Glucose 100 (H) 70 - 99 mg/dl    Performed on ACCU-CHEK    Blood Occult Stool Screen #1    Collection Time: 24  7:47 PM   Result Value Ref Range    Occult Blood Screening Result: POSITIVE  Normal range: Negative   (A)    Blood Occult Stool Screen #2    Collection Time: 24  7:47 PM   Result Value Ref Range    Occult Blood, Stool #2 Result:

## 2024-11-27 NOTE — PROGRESS NOTES
Physical Therapy  Facility/Department: [address] PROGRESSIVE CARE  Physical Therapy Treatment Note    Name: Kourtney Swann  : 1945  MRN: [id number]  Date of Service: 2024    Discharge Recommendations:  Home with assist PRN   PT Equipment Recommendations  Equipment Needed: No      Kourtney Swann scored a 22/24 on the AM-PAC short mobility form. Current research shows that an AM-PAC score of 18 or greater is typically associated with a discharge to the patient's home setting. Anticipate pt will be safe to return home at discharge.  Please see assessment section for further patient specific details.    If patient discharges prior to next session this note will serve as a discharge summary.  Please see below for the latest assessment towards goals.       Patient Diagnosis(es): The primary encounter diagnosis was Atrial fibrillation with rapid ventricular response (HCC). Diagnoses of Shortness of breath, Elevated troponin, Hyperventilation, and Anxiety state were also pertinent to this visit.  Past Medical History:  has a past medical history of Atrial fibrillation (HCC), Brain tumor (benign) (HCC), CAD (coronary artery disease), Diabetes mellitus (HCC), Hyperlipidemia, Hypertension, Meningioma (HCC), RBBB (right bundle branch block), and Seizure (HCC).  Past Surgical History:  has a past surgical history that includes Hysterectomy; Cardioversion; Tonsillectomy; Intracapsular cataract extraction (Left, 2022); and Intracapsular cataract extraction (Right, 2022).    Assessment  Body Structures, Functions, Activity Limitations Requiring Skilled Therapeutic Intervention: Decreased functional mobility   Assessment: pt is an 80 yo female who was adm to hosp with SOB; pt currently appears to be close to her baseline; recommend return home with PRN assist; pt does not want any home therapy; will follow while in hosp as pt found to be (+) for occult blood in stool.  Pt reported she was being scheduled for EGD

## 2024-11-27 NOTE — PLAN OF CARE
Problem: Chronic Conditions and Co-morbidities  Goal: Patient's chronic conditions and co-morbidity symptoms are monitored and maintained or improved  Outcome: Progressing     Problem: Discharge Planning  Goal: Discharge to home or other facility with appropriate resources  Outcome: Progressing     Problem: Pain  Goal: Verbalizes/displays adequate comfort level or baseline comfort level  Outcome: Progressing     Problem: ABCDS Injury Assessment  Goal: Absence of physical injury  Outcome: Progressing

## 2024-11-27 NOTE — CONSULTS
GASTROENTEROLOGY INPATIENT CONSULTATION        IDENTIFYING DATA/REASON FOR CONSULTATION   PATIENT:  Kourtney Swann  MRN:  [id number]  ADMIT DATE: 11/25/2024  TIME OF EVALUATION: 11/27/2024 10:31 AM  HOSPITAL STAY:   LOS: 2 days     REASON FOR CONSULTATION:  microcytic anemia, +guaiac stool, afib    HISTORY OF PRESENT ILLNESS   Kourtney Swann is a 79 y.o. female with a PMH of PAF, DM, HTN, HLD, CAD who presented on 11/25/2024 with shortness of breath, heart palpitation or dizziness.  She was admitted with syptomatic paroxysmal atrial fibrillation.  We have been consulted for microcytic anemia with a positive guaiac stool test.     Patient states she occasionally (approximately 5-6 times a year) will pass bright red blood per rectum with bowel movements which she attributes to hemorrhoids.  She denies any associated abdominal pain with these episodes.  Last episode was approximately a month ago.   She denies passing any black stools or vomiting up blood.  She denies any significant NSAID use.  She has never had an EGD or colonoscopy procedure.  She denies any known family history of colon cancer or colon polyps.  She denies any recent significant unexplained weight loss.    Iron studies show an iron of 17, saturation of 5%, ferritin 11.9.          Prior Endoscopic Evaluations: None    PAST MEDICAL, SURGICAL, FAMILY, and SOCIAL HISTORY     Past Medical History:   Diagnosis Date    Atrial fibrillation (HCC)     Brain tumor (benign) (HCC)     MRI every 6 months has swelling which causes the seizure    CAD (coronary artery disease)     Diabetes mellitus (HCC)     Hyperlipidemia     Hypertension     Meningioma (HCC)     RBBB (right bundle branch block)     Seizure (HCC)     2019 and 6/2022     Past Surgical History:   Procedure Laterality Date    CARDIOVERSION      HYSTERECTOMY (CERVIX STATUS UNKNOWN)      INTRACAPSULAR CATARACT EXTRACTION Left 8/8/2022    PHACOEMULSIFICATION WITH INTRAOCULAR LENS IMPLANT - LEFT EYE  11/26/24  0504 11/27/24  0538    141 142   K 3.7 3.6 3.5    105 108   CO2 16* 21 23   BUN 25* 28* 22*   CREATININE 1.0 1.1 0.9     Recent Labs     11/25/24  1258   AST 42*   ALT 25   BILITOT 0.4   ALKPHOS 109     No results for input(s): \"LIPASE\", \"AMYLASE\" in the last 72 hours.  No results for input(s): \"PROTIME\", \"INR\" in the last 72 hours.    Imaging  XR CHEST 1 VIEW   Final Result   No radiographic evidence of an acute cardiopulmonary process.               ASSESSMENT AND RECOMMENDATIONS   79 y.o. female with a PMH of PAF, DM, HTN, HLD, CAD who presented on 11/25/2024 with shortness of breath, heart palpitation or dizziness.  She was admitted with syptomatic paroxysmal atrial fibrillation.  We have been consulted for microcytic anemia with a positive guaiac stool test.     IMPRESSION:    Iron deficiency anemia.  Concern for occult GI blood loss with differentials including ulcers, AVMs, dieulafoy lesions, polyps, hemorrhoids, neoplasm.  Guaiac stool positive.  No prior EGD or colonoscopy.  Denies NSAID use.  Patient would benefit from EGD and colon procedure.  History of intermittent rectal bleeding.  Possibly hemorrhoidal bleeds.  As above recommend colonoscopy rule out other causes including malignancy  A-fib.  Xarelto currently on hold due to #1    RECOMMENDATIONS:    EGD and colonoscopy.  Will discuss with Dr. Jacobo timing of these procedures.  Will place pt on a liquid diet for now in case procedures can be performed tomorrow.  Continue to hold Xarelto      If you have any questions or need any further information, please feel free to contact our consult team.  Thank you for allowing us to participate in the care of Kourtney Swann.    The note was completed using Dragon voice recognition transcription. Every effort was made to ensure accuracy; however, inadvertent transcription errors may be present despite my best efforts to edit errors.      Holden Patel PA-C

## 2024-11-28 LAB
ANION GAP SERPL CALCULATED.3IONS-SCNC: 13 MMOL/L (ref 3–16)
BASOPHILS # BLD: 0.1 K/UL (ref 0–0.2)
BASOPHILS NFR BLD: 0.9 %
BUN SERPL-MCNC: 22 MG/DL (ref 7–20)
CALCIUM SERPL-MCNC: 10 MG/DL (ref 8.3–10.6)
CHLORIDE SERPL-SCNC: 107 MMOL/L (ref 99–110)
CO2 SERPL-SCNC: 22 MMOL/L (ref 21–32)
CREAT SERPL-MCNC: 1 MG/DL (ref 0.6–1.2)
DEPRECATED RDW RBC AUTO: 14.8 % (ref 12.4–15.4)
EOSINOPHIL # BLD: 0.5 K/UL (ref 0–0.6)
EOSINOPHIL NFR BLD: 5.6 %
GFR SERPLBLD CREATININE-BSD FMLA CKD-EPI: 56 ML/MIN/{1.73_M2}
GLUCOSE BLD-MCNC: 125 MG/DL (ref 70–99)
GLUCOSE BLD-MCNC: 140 MG/DL (ref 70–99)
GLUCOSE BLD-MCNC: 232 MG/DL (ref 70–99)
GLUCOSE BLD-MCNC: 232 MG/DL (ref 70–99)
GLUCOSE BLD-MCNC: 86 MG/DL (ref 70–99)
GLUCOSE SERPL-MCNC: 112 MG/DL (ref 70–99)
HCT VFR BLD AUTO: 31.8 % (ref 36–48)
HGB BLD-MCNC: 10.6 G/DL (ref 12–16)
LYMPHOCYTES # BLD: 1.9 K/UL (ref 1–5.1)
LYMPHOCYTES NFR BLD: 21.3 %
MCH RBC QN AUTO: 26 PG (ref 26–34)
MCHC RBC AUTO-ENTMCNC: 33.4 G/DL (ref 31–36)
MCV RBC AUTO: 77.9 FL (ref 80–100)
MONOCYTES # BLD: 0.8 K/UL (ref 0–1.3)
MONOCYTES NFR BLD: 8.4 %
NEUTROPHILS # BLD: 5.7 K/UL (ref 1.7–7.7)
NEUTROPHILS NFR BLD: 63.8 %
PERFORMED ON: ABNORMAL
PERFORMED ON: NORMAL
PLATELET # BLD AUTO: 408 K/UL (ref 135–450)
PMV BLD AUTO: 8.4 FL (ref 5–10.5)
POTASSIUM SERPL-SCNC: 3.9 MMOL/L (ref 3.5–5.1)
RBC # BLD AUTO: 4.09 M/UL (ref 4–5.2)
SODIUM SERPL-SCNC: 142 MMOL/L (ref 136–145)
WBC # BLD AUTO: 8.9 K/UL (ref 4–11)

## 2024-11-28 PROCEDURE — 99232 SBSQ HOSP IP/OBS MODERATE 35: CPT | Performed by: STUDENT IN AN ORGANIZED HEALTH CARE EDUCATION/TRAINING PROGRAM

## 2024-11-28 PROCEDURE — 6370000000 HC RX 637 (ALT 250 FOR IP): Performed by: INTERNAL MEDICINE

## 2024-11-28 PROCEDURE — 94760 N-INVAS EAR/PLS OXIMETRY 1: CPT

## 2024-11-28 PROCEDURE — 80048 BASIC METABOLIC PNL TOTAL CA: CPT

## 2024-11-28 PROCEDURE — 2060000000 HC ICU INTERMEDIATE R&B

## 2024-11-28 PROCEDURE — 85025 COMPLETE CBC W/AUTO DIFF WBC: CPT

## 2024-11-28 PROCEDURE — 2580000003 HC RX 258: Performed by: INTERNAL MEDICINE

## 2024-11-28 PROCEDURE — 36415 COLL VENOUS BLD VENIPUNCTURE: CPT

## 2024-11-28 RX ADMIN — LEVETIRACETAM 500 MG: 500 TABLET, FILM COATED ORAL at 08:49

## 2024-11-28 RX ADMIN — METOPROLOL TARTRATE 25 MG: 25 TABLET, FILM COATED ORAL at 19:53

## 2024-11-28 RX ADMIN — LEVETIRACETAM 500 MG: 500 TABLET, FILM COATED ORAL at 19:53

## 2024-11-28 RX ADMIN — SODIUM CHLORIDE, PRESERVATIVE FREE 10 ML: 5 INJECTION INTRAVENOUS at 19:54

## 2024-11-28 RX ADMIN — SODIUM CHLORIDE, PRESERVATIVE FREE 10 ML: 5 INJECTION INTRAVENOUS at 08:49

## 2024-11-28 RX ADMIN — AMIODARONE HYDROCHLORIDE 400 MG: 200 TABLET ORAL at 19:53

## 2024-11-28 RX ADMIN — METOPROLOL TARTRATE 25 MG: 25 TABLET, FILM COATED ORAL at 08:49

## 2024-11-28 RX ADMIN — ATORVASTATIN CALCIUM 80 MG: 80 TABLET, FILM COATED ORAL at 08:49

## 2024-11-28 RX ADMIN — INSULIN LISPRO 2 UNITS: 100 INJECTION, SOLUTION INTRAVENOUS; SUBCUTANEOUS at 12:41

## 2024-11-28 RX ADMIN — PANTOPRAZOLE SODIUM 40 MG: 40 TABLET, DELAYED RELEASE ORAL at 06:43

## 2024-11-28 RX ADMIN — POLYETHYLENE GLYCOL-3350 AND ELECTROLYTES 2000 ML: 236; 6.74; 5.86; 2.97; 22.74 POWDER, FOR SOLUTION ORAL at 15:53

## 2024-11-28 RX ADMIN — POLYETHYLENE GLYCOL-3350 AND ELECTROLYTES 2000 ML: 236; 6.74; 5.86; 2.97; 22.74 POWDER, FOR SOLUTION ORAL at 20:06

## 2024-11-28 RX ADMIN — AMIODARONE HYDROCHLORIDE 400 MG: 200 TABLET ORAL at 08:49

## 2024-11-28 RX ADMIN — INSULIN LISPRO 2 UNITS: 100 INJECTION, SOLUTION INTRAVENOUS; SUBCUTANEOUS at 19:59

## 2024-11-28 NOTE — PROGRESS NOTES
[facility] Note      Chief Complaint: I feel short of breath    Subjective/Interval History:    Patient seen and examined. Felt short of breath with activity this morning. Oxygen saturation remained normal. No cough. No chest pain. In sinus rhythm on tele.     No cough or sputum. No nausea, vomiting, diarrhea. No abdominal pain. No dysuria.  The remainder of the review of systems is negative.     PMH, PSH, FH/SH reviewed and unchanged as documented in the H&P personally documented at admission 24    Medication list reviewed    Objective:    /64   Pulse 64   Temp 97.6 °F (36.4 °C) (Oral)   Resp 16   Ht 1.524 m (5')   Wt 72.6 kg (160 lb 0.9 oz)   SpO2 97%   BMI 31.26 kg/m²   Temp  Av.8 °F (36.6 °C)  Min: 97.6 °F (36.4 °C)  Max: 98.2 °F (36.8 °C)    RRR.  Telemetry with sinus rhythm  Pulm-chest is clear throughout.  No wheezing or rhonchi or crackles noted.  Abd- soft, NTND.  Benign abdominal exam  Ext- no edema    The Following Labs Were Reviewed Today:     Recent Results (from the past 24 hour(s))   POCT Glucose    Collection Time: 24 11:57 AM   Result Value Ref Range    POC Glucose 188 (H) 70 - 99 mg/dl    Performed on ACCU-CHEK    POCT Glucose    Collection Time: 24  1:01 PM   Result Value Ref Range    POC Glucose 134 (H) 70 - 99 mg/dl    Performed on ACCU-CHEK    POCT Glucose    Collection Time: 24  5:37 PM   Result Value Ref Range    POC Glucose 150 (H) 70 - 99 mg/dl    Performed on ACCU-CHEK    POCT Glucose    Collection Time: 24  8:20 PM   Result Value Ref Range    POC Glucose 157 (H) 70 - 99 mg/dl    Performed on ACCU-CHEK    CBC with Auto Differential    Collection Time: 24  6:14 AM   Result Value Ref Range    WBC 8.9 4.0 - 11.0 K/uL    RBC 4.09 4.00 - 5.20 M/uL    Hemoglobin 10.6 (L) 12.0 - 16.0 g/dL    Hematocrit 31.8 (L) 36.0 - 48.0 %    MCV 77.9 (L) 80.0 - 100.0 fL    MCH 26.0 26.0 - 34.0 pg    MCHC 33.4 31.0 - 36.0 g/dL    RDW 14.8

## 2024-11-28 NOTE — PROGRESS NOTES
pt requested rn come to her room because she was feeling SOB, heaviness in her chest.  pt stated last time she was in RVR.  before entering the room rn viewed monitor pt was 65.  RN placed her on 2 L of O2 for comfort but HR has continued to be between 60-70. Confirmed this with [facility].  Pt has had no episodes of Afib during this past hour.

## 2024-11-28 NOTE — PROGRESS NOTES
Gastroenterology Progress Note    Kourtney Swann is a 79 y.o. female patient.  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 *      SUBJECTIVE:  No nausea, vomiting, melena, hematochezia, abdominal pain.    Current Facility-Administered Medications: polyethylene glycol (GoLYTELY) solution 2,000 mL, 2,000 mL, Oral, Once  polyethylene glycol (GoLYTELY) solution 2,000 mL, 2,000 mL, Oral, Once  pantoprazole (PROTONIX) tablet 40 mg, 40 mg, Oral, QAM AC  amiodarone (CORDARONE) tablet 400 mg, 400 mg, Oral, BID **FOLLOWED BY** [START ON 11/29/2024] amiodarone (CORDARONE) tablet 200 mg, 200 mg, Oral, BID **FOLLOWED BY** [START ON 12/6/2024] amiodarone (CORDARONE) tablet 200 mg, 200 mg, Oral, Daily  atorvastatin (LIPITOR) tablet 80 mg, 80 mg, Oral, Daily  levETIRAcetam (KEPPRA) tablet 500 mg, 500 mg, Oral, BID  [Held by provider] rivaroxaban (XARELTO) tablet 20 mg, 20 mg, Oral, Dinner  sodium chloride flush 0.9 % injection 5-40 mL, 5-40 mL, IntraVENous, 2 times per day  sodium chloride flush 0.9 % injection 5-40 mL, 5-40 mL, IntraVENous, PRN  0.9 % sodium chloride infusion, , IntraVENous, PRN  potassium chloride (KLOR-CON M) extended release tablet 40 mEq, 40 mEq, Oral, PRN **OR** potassium bicarb-citric acid (EFFER-K) effervescent tablet 40 mEq, 40 mEq, Oral, PRN **OR** potassium chloride 10 mEq/100 mL IVPB (Peripheral Line), 10 mEq, IntraVENous, PRN  magnesium sulfate 2000 mg in 50 mL IVPB premix, 2,000 mg, IntraVENous, PRN  ondansetron (ZOFRAN-ODT) disintegrating tablet 4 mg, 4 mg, Oral, Q8H PRN **OR** ondansetron (ZOFRAN) injection 4 mg, 4 mg, IntraVENous, Q6H PRN  polyethylene glycol (GLYCOLAX) packet 17 g, 17 g, Oral, Daily

## 2024-11-29 ENCOUNTER — ANESTHESIA (OUTPATIENT)
Dept: ENDOSCOPY | Age: 79
End: 2024-11-29
Payer: MEDICARE

## 2024-11-29 ENCOUNTER — ANESTHESIA EVENT (OUTPATIENT)
Dept: ENDOSCOPY | Age: 79
End: 2024-11-29
Payer: MEDICARE

## 2024-11-29 VITALS
RESPIRATION RATE: 17 BRPM | WEIGHT: 161.16 LBS | HEIGHT: 60 IN | TEMPERATURE: 97.5 F | SYSTOLIC BLOOD PRESSURE: 147 MMHG | BODY MASS INDEX: 31.64 KG/M2 | HEART RATE: 64 BPM | DIASTOLIC BLOOD PRESSURE: 78 MMHG | OXYGEN SATURATION: 98 %

## 2024-11-29 PROBLEM — D50.9 IRON DEFICIENCY ANEMIA: Status: ACTIVE | Noted: 2024-11-29

## 2024-11-29 LAB
ALBUMIN SERPL-MCNC: 4.1 G/DL (ref 3.4–5)
ALBUMIN/GLOB SERPL: 1.2 {RATIO} (ref 1.1–2.2)
ALP SERPL-CCNC: 112 U/L (ref 40–129)
ALT SERPL-CCNC: 35 U/L (ref 10–40)
ANION GAP SERPL CALCULATED.3IONS-SCNC: 11 MMOL/L (ref 3–16)
ANION GAP SERPL CALCULATED.3IONS-SCNC: 12 MMOL/L (ref 3–16)
AST SERPL-CCNC: 54 U/L (ref 15–37)
BASOPHILS # BLD: 0.1 K/UL (ref 0–0.2)
BASOPHILS NFR BLD: 1.1 %
BILIRUB SERPL-MCNC: 0.4 MG/DL (ref 0–1)
BUN SERPL-MCNC: 15 MG/DL (ref 7–20)
BUN SERPL-MCNC: 16 MG/DL (ref 7–20)
CALCIUM SERPL-MCNC: 9.4 MG/DL (ref 8.3–10.6)
CALCIUM SERPL-MCNC: 9.6 MG/DL (ref 8.3–10.6)
CHLORIDE SERPL-SCNC: 106 MMOL/L (ref 99–110)
CHLORIDE SERPL-SCNC: 108 MMOL/L (ref 99–110)
CO2 SERPL-SCNC: 19 MMOL/L (ref 21–32)
CO2 SERPL-SCNC: 24 MMOL/L (ref 21–32)
CREAT SERPL-MCNC: 0.9 MG/DL (ref 0.6–1.2)
CREAT SERPL-MCNC: 1 MG/DL (ref 0.6–1.2)
DEPRECATED RDW RBC AUTO: 14.6 % (ref 12.4–15.4)
DEPRECATED RDW RBC AUTO: 14.8 % (ref 12.4–15.4)
EOSINOPHIL # BLD: 0.4 K/UL (ref 0–0.6)
EOSINOPHIL NFR BLD: 4.3 %
FOLATE SERPL-MCNC: 24.4 NG/ML (ref 4.78–24.2)
GFR SERPLBLD CREATININE-BSD FMLA CKD-EPI: 56 ML/MIN/{1.73_M2}
GFR SERPLBLD CREATININE-BSD FMLA CKD-EPI: 64 ML/MIN/{1.73_M2}
GLUCOSE BLD-MCNC: 107 MG/DL (ref 70–99)
GLUCOSE BLD-MCNC: 112 MG/DL (ref 70–99)
GLUCOSE BLD-MCNC: 114 MG/DL (ref 70–99)
GLUCOSE BLD-MCNC: 116 MG/DL (ref 70–99)
GLUCOSE SERPL-MCNC: 117 MG/DL (ref 70–99)
GLUCOSE SERPL-MCNC: 96 MG/DL (ref 70–99)
HCT VFR BLD AUTO: 30.7 % (ref 36–48)
HCT VFR BLD AUTO: 32.1 % (ref 36–48)
HGB BLD-MCNC: 10.5 G/DL (ref 12–16)
HGB BLD-MCNC: 10.7 G/DL (ref 12–16)
LYMPHOCYTES # BLD: 1.7 K/UL (ref 1–5.1)
LYMPHOCYTES NFR BLD: 19.6 %
MAGNESIUM SERPL-MCNC: 1.9 MG/DL (ref 1.8–2.4)
MCH RBC QN AUTO: 26 PG (ref 26–34)
MCH RBC QN AUTO: 26.5 PG (ref 26–34)
MCHC RBC AUTO-ENTMCNC: 33.4 G/DL (ref 31–36)
MCHC RBC AUTO-ENTMCNC: 34.1 G/DL (ref 31–36)
MCV RBC AUTO: 77.6 FL (ref 80–100)
MCV RBC AUTO: 77.7 FL (ref 80–100)
MONOCYTES # BLD: 1 K/UL (ref 0–1.3)
MONOCYTES NFR BLD: 11.8 %
NEUTROPHILS # BLD: 5.6 K/UL (ref 1.7–7.7)
NEUTROPHILS NFR BLD: 63.2 %
PERFORMED ON: ABNORMAL
PHOSPHATE SERPL-MCNC: 2 MG/DL (ref 2.5–4.9)
PLATELET # BLD AUTO: 394 K/UL (ref 135–450)
PLATELET # BLD AUTO: 403 K/UL (ref 135–450)
PMV BLD AUTO: 8.4 FL (ref 5–10.5)
PMV BLD AUTO: 8.5 FL (ref 5–10.5)
POTASSIUM SERPL-SCNC: 2.9 MMOL/L (ref 3.5–5.1)
POTASSIUM SERPL-SCNC: 3.4 MMOL/L (ref 3.5–5.1)
POTASSIUM SERPL-SCNC: 4.4 MMOL/L (ref 3.5–5.1)
PROT SERPL-MCNC: 7.5 G/DL (ref 6.4–8.2)
RBC # BLD AUTO: 3.95 M/UL (ref 4–5.2)
RBC # BLD AUTO: 4.13 M/UL (ref 4–5.2)
SODIUM SERPL-SCNC: 139 MMOL/L (ref 136–145)
SODIUM SERPL-SCNC: 141 MMOL/L (ref 136–145)
VIT B12 SERPL-MCNC: 525 PG/ML (ref 211–911)
WBC # BLD AUTO: 11.1 K/UL (ref 4–11)
WBC # BLD AUTO: 8.8 K/UL (ref 4–11)

## 2024-11-29 PROCEDURE — 99239 HOSP IP/OBS DSCHRG MGMT >30: CPT | Performed by: INTERNAL MEDICINE

## 2024-11-29 PROCEDURE — 7100000000 HC PACU RECOVERY - FIRST 15 MIN: Performed by: INTERNAL MEDICINE

## 2024-11-29 PROCEDURE — 36415 COLL VENOUS BLD VENIPUNCTURE: CPT

## 2024-11-29 PROCEDURE — 3609012400 HC EGD TRANSORAL BIOPSY SINGLE/MULTIPLE: Performed by: INTERNAL MEDICINE

## 2024-11-29 PROCEDURE — 85027 COMPLETE CBC AUTOMATED: CPT

## 2024-11-29 PROCEDURE — 6360000002 HC RX W HCPCS: Performed by: INTERNAL MEDICINE

## 2024-11-29 PROCEDURE — 80053 COMPREHEN METABOLIC PANEL: CPT

## 2024-11-29 PROCEDURE — 7100000001 HC PACU RECOVERY - ADDTL 15 MIN: Performed by: INTERNAL MEDICINE

## 2024-11-29 PROCEDURE — 2580000003 HC RX 258: Performed by: INTERNAL MEDICINE

## 2024-11-29 PROCEDURE — 6370000000 HC RX 637 (ALT 250 FOR IP): Performed by: INTERNAL MEDICINE

## 2024-11-29 PROCEDURE — 2709999900 HC NON-CHARGEABLE SUPPLY: Performed by: INTERNAL MEDICINE

## 2024-11-29 PROCEDURE — 6360000002 HC RX W HCPCS: Performed by: NURSE ANESTHETIST, CERTIFIED REGISTERED

## 2024-11-29 PROCEDURE — 84100 ASSAY OF PHOSPHORUS: CPT

## 2024-11-29 PROCEDURE — 0DBP8ZZ EXCISION OF RECTUM, VIA NATURAL OR ARTIFICIAL OPENING ENDOSCOPIC: ICD-10-PCS | Performed by: INTERNAL MEDICINE

## 2024-11-29 PROCEDURE — 88341 IMHCHEM/IMCYTCHM EA ADD ANTB: CPT

## 2024-11-29 PROCEDURE — 83735 ASSAY OF MAGNESIUM: CPT

## 2024-11-29 PROCEDURE — 82746 ASSAY OF FOLIC ACID SERUM: CPT

## 2024-11-29 PROCEDURE — 6370000000 HC RX 637 (ALT 250 FOR IP): Performed by: REGISTERED NURSE

## 2024-11-29 PROCEDURE — 3700000001 HC ADD 15 MINUTES (ANESTHESIA): Performed by: INTERNAL MEDICINE

## 2024-11-29 PROCEDURE — 0DBH8ZZ EXCISION OF CECUM, VIA NATURAL OR ARTIFICIAL OPENING ENDOSCOPIC: ICD-10-PCS | Performed by: INTERNAL MEDICINE

## 2024-11-29 PROCEDURE — 3609010600 HC COLONOSCOPY POLYPECTOMY SNARE/COLD BIOPSY: Performed by: INTERNAL MEDICINE

## 2024-11-29 PROCEDURE — 3700000000 HC ANESTHESIA ATTENDED CARE: Performed by: INTERNAL MEDICINE

## 2024-11-29 PROCEDURE — 85025 COMPLETE CBC W/AUTO DIFF WBC: CPT

## 2024-11-29 PROCEDURE — 82607 VITAMIN B-12: CPT

## 2024-11-29 PROCEDURE — 94760 N-INVAS EAR/PLS OXIMETRY 1: CPT

## 2024-11-29 PROCEDURE — 9990000010 HC NO CHARGE VISIT: Performed by: PHYSICAL THERAPIST

## 2024-11-29 PROCEDURE — 0DB98ZX EXCISION OF DUODENUM, VIA NATURAL OR ARTIFICIAL OPENING ENDOSCOPIC, DIAGNOSTIC: ICD-10-PCS | Performed by: INTERNAL MEDICINE

## 2024-11-29 PROCEDURE — 84132 ASSAY OF SERUM POTASSIUM: CPT

## 2024-11-29 PROCEDURE — 2720000010 HC SURG SUPPLY STERILE: Performed by: INTERNAL MEDICINE

## 2024-11-29 PROCEDURE — 88342 IMHCHEM/IMCYTCHM 1ST ANTB: CPT

## 2024-11-29 PROCEDURE — 88305 TISSUE EXAM BY PATHOLOGIST: CPT

## 2024-11-29 RX ORDER — LORAZEPAM 2 MG/ML
1 INJECTION INTRAMUSCULAR ONCE
Status: DISCONTINUED | OUTPATIENT
Start: 2024-11-29 | End: 2024-11-29 | Stop reason: HOSPADM

## 2024-11-29 RX ORDER — PHENYLEPHRINE HCL IN 0.9% NACL 1 MG/10 ML
SYRINGE (ML) INTRAVENOUS
Status: DISCONTINUED | OUTPATIENT
Start: 2024-11-29 | End: 2024-11-29 | Stop reason: SDUPTHER

## 2024-11-29 RX ORDER — SODIUM CHLORIDE 0.9 % (FLUSH) 0.9 %
5-40 SYRINGE (ML) INJECTION EVERY 12 HOURS SCHEDULED
Status: DISCONTINUED | OUTPATIENT
Start: 2024-11-29 | End: 2024-11-29 | Stop reason: HOSPADM

## 2024-11-29 RX ORDER — FERROUS SULFATE 325(65) MG
325 TABLET ORAL
Qty: 30 TABLET | Refills: 1 | Status: SHIPPED | OUTPATIENT
Start: 2024-11-29

## 2024-11-29 RX ORDER — AMIODARONE HYDROCHLORIDE 200 MG/1
TABLET ORAL
Qty: 90 TABLET | Refills: 1 | Status: SHIPPED | OUTPATIENT
Start: 2024-11-29

## 2024-11-29 RX ORDER — NALOXONE HYDROCHLORIDE 0.4 MG/ML
INJECTION, SOLUTION INTRAMUSCULAR; INTRAVENOUS; SUBCUTANEOUS PRN
Status: DISCONTINUED | OUTPATIENT
Start: 2024-11-29 | End: 2024-11-29 | Stop reason: HOSPADM

## 2024-11-29 RX ORDER — SODIUM CHLORIDE 0.9 % (FLUSH) 0.9 %
5-40 SYRINGE (ML) INJECTION PRN
Status: DISCONTINUED | OUTPATIENT
Start: 2024-11-29 | End: 2024-11-29 | Stop reason: HOSPADM

## 2024-11-29 RX ORDER — METOPROLOL TARTRATE 25 MG/1
25 TABLET, FILM COATED ORAL 2 TIMES DAILY
Qty: 60 TABLET | Refills: 3 | Status: SHIPPED | OUTPATIENT
Start: 2024-11-29

## 2024-11-29 RX ORDER — LIDOCAINE HYDROCHLORIDE 20 MG/ML
INJECTION, SOLUTION EPIDURAL; INFILTRATION; INTRACAUDAL; PERINEURAL
Status: DISCONTINUED | OUTPATIENT
Start: 2024-11-29 | End: 2024-11-29 | Stop reason: SDUPTHER

## 2024-11-29 RX ORDER — SODIUM CHLORIDE 9 MG/ML
INJECTION, SOLUTION INTRAVENOUS PRN
Status: DISCONTINUED | OUTPATIENT
Start: 2024-11-29 | End: 2024-11-29 | Stop reason: HOSPADM

## 2024-11-29 RX ORDER — PROPOFOL 10 MG/ML
INJECTION, EMULSION INTRAVENOUS
Status: DISCONTINUED | OUTPATIENT
Start: 2024-11-29 | End: 2024-11-29 | Stop reason: SDUPTHER

## 2024-11-29 RX ORDER — HYDROXYZINE HYDROCHLORIDE 25 MG/1
25 TABLET, FILM COATED ORAL 3 TIMES DAILY PRN
Status: DISCONTINUED | OUTPATIENT
Start: 2024-11-29 | End: 2024-11-29 | Stop reason: HOSPADM

## 2024-11-29 RX ADMIN — SODIUM CHLORIDE, PRESERVATIVE FREE 5 ML: 5 INJECTION INTRAVENOUS at 12:53

## 2024-11-29 RX ADMIN — POTASSIUM CHLORIDE 10 MEQ: 7.46 INJECTION, SOLUTION INTRAVENOUS at 10:00

## 2024-11-29 RX ADMIN — PROPOFOL 70 MG: 10 INJECTION, EMULSION INTRAVENOUS at 12:04

## 2024-11-29 RX ADMIN — HYDROXYZINE HYDROCHLORIDE 25 MG: 25 TABLET, FILM COATED ORAL at 03:12

## 2024-11-29 RX ADMIN — LEVETIRACETAM 500 MG: 500 TABLET, FILM COATED ORAL at 14:18

## 2024-11-29 RX ADMIN — POTASSIUM CHLORIDE 40 MEQ: 1500 TABLET, EXTENDED RELEASE ORAL at 15:12

## 2024-11-29 RX ADMIN — PROPOFOL 150 MCG/KG/MIN: 10 INJECTION, EMULSION INTRAVENOUS at 12:05

## 2024-11-29 RX ADMIN — ATORVASTATIN CALCIUM 80 MG: 80 TABLET, FILM COATED ORAL at 14:18

## 2024-11-29 RX ADMIN — POTASSIUM CHLORIDE 10 MEQ: 7.46 INJECTION, SOLUTION INTRAVENOUS at 11:07

## 2024-11-29 RX ADMIN — AMIODARONE HYDROCHLORIDE 200 MG: 200 TABLET ORAL at 14:18

## 2024-11-29 RX ADMIN — POTASSIUM CHLORIDE 10 MEQ: 7.46 INJECTION, SOLUTION INTRAVENOUS at 13:31

## 2024-11-29 RX ADMIN — POTASSIUM CHLORIDE 100 ML: 7.46 INJECTION, SOLUTION INTRAVENOUS at 12:14

## 2024-11-29 RX ADMIN — METOPROLOL TARTRATE 25 MG: 25 TABLET, FILM COATED ORAL at 14:18

## 2024-11-29 RX ADMIN — POTASSIUM CHLORIDE 10 MEQ: 7.46 INJECTION, SOLUTION INTRAVENOUS at 07:41

## 2024-11-29 RX ADMIN — LIDOCAINE HYDROCHLORIDE 70 MG: 20 INJECTION, SOLUTION EPIDURAL; INFILTRATION; INTRACAUDAL; PERINEURAL at 12:03

## 2024-11-29 RX ADMIN — Medication 300 MCG: at 12:53

## 2024-11-29 ASSESSMENT — PAIN SCALES - GENERAL
PAINLEVEL_OUTOF10: 0
PAINLEVEL_OUTOF10: 0

## 2024-11-29 ASSESSMENT — PAIN - FUNCTIONAL ASSESSMENT: PAIN_FUNCTIONAL_ASSESSMENT: ADULT NONVERBAL PAIN SCALE (NPVS)

## 2024-11-29 ASSESSMENT — LIFESTYLE VARIABLES: SMOKING_STATUS: 0

## 2024-11-29 ASSESSMENT — ENCOUNTER SYMPTOMS: SHORTNESS OF BREATH: 1

## 2024-11-29 NOTE — PROGRESS NOTES
[facility] Note      Chief Complaint: I feel ok    Subjective/Interval History:    EGD and colonoscopy planned for today.  Patient tolerated the full GoLytely prep well.  No new problems this morning.  The patient is anxious to get home today.    Potassium is low, discussed with nursing.  This will be replaced by protocol.  Discussed with nursing, no new concerns overnight.    No cough or sputum. No nausea, vomiting, diarrhea. No abdominal pain. No dysuria.  The remainder of the review of systems is negative.     PMH, PSH, FH/SH reviewed and unchanged as documented in the H&P personally documented at admission 24    Medication list reviewed    Objective:    BP (!) 143/67   Pulse 90   Temp 97.9 °F (36.6 °C) (Oral)   Resp 18   Ht 1.524 m (5')   Wt 73.1 kg (161 lb 2.5 oz)   SpO2 100%   BMI 31.47 kg/m²   Temp  Av.5 °F (36.4 °C)  Min: 97.3 °F (36.3 °C)  Max: 97.9 °F (36.6 °C)    RRR.  Telemetry with sinus rhythm  Pulm-chest is clear throughout.  No wheezing or rhonchi or crackles noted.  Abd- soft, NTND.  Benign abdominal exam  Ext- no edema    The Following Labs Were Reviewed Today:     Recent Results (from the past 24 hour(s))   POCT Glucose    Collection Time: 24  8:21 AM   Result Value Ref Range    POC Glucose 125 (H) 70 - 99 mg/dl    Performed on ACCU-CHEK    POCT Glucose    Collection Time: 24 11:54 AM   Result Value Ref Range    POC Glucose 232 (H) 70 - 99 mg/dl    Performed on ACCU-CHEK    POCT Glucose    Collection Time: 24  5:46 PM   Result Value Ref Range    POC Glucose 86 70 - 99 mg/dl    Performed on ACCU-CHEK    POCT Glucose    Collection Time: 24  7:51 PM   Result Value Ref Range    POC Glucose 232 (H) 70 - 99 mg/dl    Performed on ACCU-CHEK    POCT Glucose    Collection Time: 24  2:21 AM   Result Value Ref Range    POC Glucose 107 (H) 70 - 99 mg/dl    Performed on ACCU-CHEK    POCT Glucose    Collection Time: 24  3:01 AM   Result Value

## 2024-11-29 NOTE — ANESTHESIA POSTPROCEDURE EVALUATION
Department of Anesthesiology  Postprocedure Note    Patient: Kourtney Swann  MRN: [id number]  YOB: 1945  Date of evaluation: 11/29/2024    Procedure Summary       Date: 11/29/24 Room / Location: [place] [address] / [facility]    Anesthesia Start: 1156 Anesthesia Stop:     Procedures:       ESOPHAGOGASTRODUODENOSCOPY BIOPSY      COLONOSCOPY POLYPECTOMY HOT SNARE/BIOPSY Diagnosis: Iron deficiency anemia, unspecified iron deficiency anemia type    Surgeons: Scar Jacobo MD Responsible Provider: Gilberto Pascual MD    Anesthesia Type: MAC ASA Status: 3            Anesthesia Type: MAC    Disha Phase I:      Disha Phase II:      Anesthesia Post Evaluation    Patient location during evaluation: PACU  Patient participation: complete - patient participated  Level of consciousness: awake and sleepy but conscious  Airway patency: patent  Nausea & Vomiting: no nausea and no vomiting  Cardiovascular status: hemodynamically stable and blood pressure returned to baseline (telemetry box at side)  Respiratory status: spontaneous ventilation, nonlabored ventilation and nasal cannula  Hydration status: stable  Comments: Adequate hemostasis intra-op. Ms. Swann was seen resting following her procedure. Will allow patient to become more alert before return to floor. No acute concerns at this time.   Pain management: adequate    No notable events documented.

## 2024-11-29 NOTE — PROGRESS NOTES
Patient doing well s/p EGD and colonoscopy.  Okay to restart anticoagulation on Monday.  Plan to add oral iron and follow-up with Dr. Oliveira in 1 week.  Electronically signed by ELVIS ARELLANO MD on 11/29/2024 at 3:00 PM

## 2024-11-29 NOTE — PLAN OF CARE
Problem: Discharge Planning  Goal: Discharge to home or other facility with appropriate resources  Outcome: Progressing  Flowsheets (Taken 11/28/2024 2006)  Discharge to home or other facility with appropriate resources:   Identify barriers to discharge with patient and caregiver   Identify discharge learning needs (meds, wound care, etc)     Problem: Pain  Goal: Verbalizes/displays adequate comfort level or baseline comfort level  Outcome: Progressing

## 2024-11-29 NOTE — CODE DOCUMENTATION
Atarax ordered for patient. Labs ordered per Dawna Arrieta NP. Psych consult also placed per verbal orders given. Per MD and NP at bedside, no stroke suspected.

## 2024-11-29 NOTE — CARE COORDINATION
DISCHARGE PLANNING:    DC plan to return home alone with friend support. Friend will transport. Denied discharge needs. EGD colonoscopy today,  watching recs.    #[phone]  Electronically signed by Rayna Tineo RN on 11/29/2024 at 3:15 PM

## 2024-11-29 NOTE — PROGRESS NOTES
Pt arouses on arrival to PACU. VSS. Pt moving around and complain of leg pain from position. Repositioned on stretcher. HOB up. Potassium infusing on IV pump.

## 2024-11-29 NOTE — PROGRESS NOTES
Occupational Therapy    11/29/24    Kourtney Swann  1945  [id number]    Patient chart reviewed. OT attempted to see for OT tx at this time. Pt off floor at this time at procedure. Will re-attempt pending schedule and participation.     If patient is discharged prior to the next occupational therapy visit; Please see last written OT note for discharge status.     Electronically signed by FUNMILAYO Juarez, OMID/L on 11/29/2024 at 12:49 PM

## 2024-11-29 NOTE — PROGRESS NOTES
Patient given Atarax after Code Stroke, appeared to be more anxiety related to nurse and both MD/NP. Patient expressed much relief with the Atarax and states she would possibly like to be on something at home. Psych consult placed per recommendation of NP.     Electronically signed by Susie Prado RN on 11/29/2024 at 7:05 AM

## 2024-11-29 NOTE — CODE DOCUMENTATION
Code stroke called d/t pt feeling anxious and shaking, not following commands, l sided weakness noted, MD at bedside to complete NIH

## 2024-11-29 NOTE — PROGRESS NOTES
Physical Therapy    Kourtney KAILA Swann  [id number]  [id number]    Attempted to see for PT session however pt out of room for procedure.  Will follow and attempt later as schedule permits  Electronically signed by VICKY JAIN PT on 11/29/2024 at 12:04 PM       Pt just back from her colonoscopy/EGD.  Attempted to see for PT session however pt just getting something to eat and wishes to defer therapy.  Pt and nurse report pt has been getting up in room without assist.  Pt will be safe to return home without any further therapy  Electronically signed by VICKY JAIN PT on 11/29/2024 at 2:19 PM

## 2024-11-29 NOTE — PLAN OF CARE
Problem: Chronic Conditions and Co-morbidities  Goal: Patient's chronic conditions and co-morbidity symptoms are monitored and maintained or improved  Outcome: Progressing     Problem: Discharge Planning  Goal: Discharge to home or other facility with appropriate resources  11/29/2024 1201 by Patricia Flowers RN  Outcome: Progressing  11/29/2024 0337 by Susie Prado, RN  Outcome: Progressing  Flowsheets (Taken 11/28/2024 2006)  Discharge to home or other facility with appropriate resources:   Identify barriers to discharge with patient and caregiver   Identify discharge learning needs (meds, wound care, etc)     Problem: Pain  Goal: Verbalizes/displays adequate comfort level or baseline comfort level  11/29/2024 1201 by Patricia Flowers RN  Outcome: Progressing  11/29/2024 0337 by Susie Prado RN  Outcome: Progressing     Problem: ABCDS Injury Assessment  Goal: Absence of physical injury  Outcome: Progressing

## 2024-11-29 NOTE — PROGRESS NOTES
RN overnight was unable to obtain IV access and start potassium replacement.This RN started IV potassium replacement with a critical potassium of 2.9. Ultrasound iv placed. Pt going to colonoscopy at 11 per endo so unable to give PO replacement.     Electronically signed by Patricia Flowers RN on 11/29/2024 at 10:17 AM

## 2024-11-29 NOTE — PROGRESS NOTES
Discharge paperwork reviewed with pt. Pt verbalized understanding. Ivs removed along with tele. All pt belongings packed up. RN transported pt by wheelchair to lobby with all belongings.     Electronically signed by Patricia Flowers RN on 11/29/2024 at 4:51 PM

## 2024-11-29 NOTE — ANESTHESIA PRE PROCEDURE
again in the office after 3 to 4 weeks  -Discussed with  who agrees with the recommendation  -Will continue Xarelto 20 mg daily     Hypertension  -Blood pressure is stable today     Borderline troponin elevation  -Likely due to recurrent atrial fibrillation  -Recent nuclear stress test showed no reversible ischemia         Neuro/Psych:   (+) seizures: well controlled   (-) CVA            ROS comment: Nonintractable generalized idiopathic epilepsy without status epilepticus-continues on Keppra. GI/Hepatic/Renal: Neg GI/Hepatic/Renal ROS       (-) GERD, liver disease and no renal disease       Endo/Other:    (+) DiabetesType II DM, blood dyscrasia (xarelto yesterday): anticoagulation therapy and anemia:., electrolyte abnormalities (Hypokalemia).                 Abdominal:   (+) obese          Vascular: negative vascular ROS.         Other Findings:         Anesthesia Plan      MAC     ASA 3       Induction: intravenous.      Anesthetic plan and risks discussed with patient.      Plan discussed with CRNA.    Attending anesthesiologist reviewed and agrees with Preprocedure content          This pre-anesthesia assessment may be used as a history and physical.    DOS STAFF ADDENDUM:    Pt seen and examined, chart reviewed (including anesthesia, drug and allergy history).  No interval changes to history and physical examination.  Anesthetic plan, risks, benefits, alternatives, and personnel involved discussed with patient. Questions and concerns addressed.  Patient(family) verbalized an understanding and agrees to proceed.      Bel Chowdhury MD  November 29, 2024  9:35 AM

## 2024-11-29 NOTE — H&P
Pre-operative History and Physical    Patient: Kourtney Swann  : 1945  Acct#:     HISTORY OF PRESENT ILLNESS:    The patient is a 79 y.o. female who presents with iron deficiency anemia for EGD and colonoscopy    Past Medical History:        Diagnosis Date    Atrial fibrillation (HCC)     Brain tumor (benign) (HCC)     MRI every 6 months has swelling which causes the seizure    CAD (coronary artery disease)     Diabetes mellitus (HCC)     Hyperlipidemia     Hypertension     Meningioma (HCC)     RBBB (right bundle branch block)     Seizure (HCC)      and 2022      Past Surgical History:        Procedure Laterality Date    CARDIOVERSION      HYSTERECTOMY (CERVIX STATUS UNKNOWN)      INTRACAPSULAR CATARACT EXTRACTION Left 2022    PHACOEMULSIFICATION WITH INTRAOCULAR LENS IMPLANT - LEFT EYE performed by Jose Luis Platt MD at [facility]    INTRACAPSULAR CATARACT EXTRACTION Right 2022    PHACOEMULSIFICATION WITH INTRAOCULAR LENS IMPLANT - RIGHT EYE performed by Jose Luis Platt MD at [facility]    TONSILLECTOMY        Medications Prior to Admission:   No current facility-administered medications on file prior to encounter.     Current Outpatient Medications on File Prior to Encounter   Medication Sig Dispense Refill    amLODIPine (NORVASC) 10 MG tablet Take 1 tablet by mouth daily 90 tablet 0    losartan-hydroCHLOROthiazide (HYZAAR) 100-25 MG per tablet Take 1 tablet by mouth daily 90 tablet 0    metFORMIN (GLUCOPHAGE-XR) 500 MG extended release tablet Take 3 tablets by mouth daily (with breakfast) 270 tablet 0    levETIRAcetam (KEPPRA) 500 MG tablet Take 1 tablet by mouth 2 times daily 60 tablet 1    atorvastatin (LIPITOR) 80 MG tablet Take 1 tablet by mouth daily      rivaroxaban (XARELTO) 20 MG TABS tablet Take 1 tablet by mouth Daily with supper      Multiple Vitamins-Minerals (THERAPEUTIC MULTIVITAMIN-MINERALS) tablet Take 1 tablet by mouth daily      vitamin D 
7.440 7.350 - 7.450    pCO2, Jesu 36.6 (L) 40.0 - 50.0 mmHg    PO2, Jesu <30 Not Established mmHg    HCO3, Venous 25 23 - 29 mmol/L    Base Excess, Jesu 0.9 Not Established mmol/L    O2 Sat, Jeus 39 Not Established %    Carboxyhemoglobin 1.0 %    MetHgb, Jesu 0.5 <1.5 %    TC02 (Calc), Jesu 26 Not Established mmol/L    O2 Therapy Unknown        ASSESSMENT:      Principal Problem:    Atrial fibrillation with RVR (HCC)  Active Problems:    Shortness of breath    Nonintractable generalized idiopathic epilepsy without status epilepticus (HCC)    Diabetes mellitus (HCC)    Primary hypertension    PLAN:    Patient currently in sinus rhythm.  Home medications reviewed.    Low-dose atenolol changed to metoprolol 25 mg twice daily.  Serial troponins will be obtained and she will be kept on telemetry.  Cardiology has been consulted.  Labs to be repeated in the morning and iron studies will be sent for her microcytic anemia.  TSH will be checked.  Activity will be up with assistance as tolerated.        ELVIS ARELLANO MD  7:10 PM  11/25/2024

## 2024-11-29 NOTE — PROGRESS NOTES
Biopsy in the small bowel  Large polyp removed from the rectum using hot snare, clips placed x2  Additional polyp removed from cecum  Post-op skin warm dry and intact.      Resolution® Clip Device    Date Placed: ________11/29/24_________________  Anatomical Location: _______Rectum____________      MR Conditional     Magnetic Resonance (MR) Information  Non-clinical testing has demonstrated the Resolution® Clip is MR Conditional according to  ASTM . A patient with this clip(s) can be safely scanned under the following conditions:    Static Magnetic Field  Static magnetic field of 1.5 and 3 Dorys with:   Spatial gradient field of 2500 Gauss/cm (value extrapolated) and less   Maximum whole body averaged specific absorption rate (CANDIDO) of 2 W/kg in Normal Operating  Mode for a maximum scan time of 15 minutes of continuous scanning at 1.5T and at 3T.    MR Related Heating  In non-clinical testing, the “Resolution Clips” produced a temperature rise of less than 1.4 °C at a maximum  extrapolated WBA CANDIDO of 2.0 W/kg for 15 min. of continuous MR scanning with body coil in a 1.5 Dorys [employer]™,  CiviQ (software: release 12.6.1.3, 2010-12-02) MR Scanner.  In non-clinical testing, the “Resolution Clips” produced a temperature rise of less than 4.0 °C at a maximum  extrapolated WBA CANDIDO of 2.0 W/kg for 15 min. of continuous MR scanning with body coil in a 3 Dorys Magnetom  Trio™, Siemens [employer] Systems (software: Photetica/4, syngo MRA30) MR Scanner.    Image Artifacts  MR image quality may be compromised if the area of interest is within approximately 80 mm of the clip(s) as  found in non-clinical testing using a spin echo and gradient echo pulse sequence in a 3T MR system ([employer], [employer], [place], [employer]oust[employer], software 2.6.3.7 2010-11-24). Therefore, it may be necessary  to optimize MR Imaging parameters in the presence of this implant.  Peel recommends that the patient

## 2024-11-29 NOTE — OP NOTE
Endoscopy Note    Patient: Kourtney Swann  : 1945  Acct#:     Procedure: Esophagogastroduodenoscopy with biopsy   Colonoscopy with intubation of the terminal ileum   Colonoscopy with snare polypectomy    Date:  2024     Surgeon:  Scar Jacobo MD    Referring Physician:  Dr. Keller    Indications: This is a 79 y.o. year old female who presents today with iron deficiency anemia for EGD and colonoscopy    Anesthesia:  TIVA    Description of Procedure:  Informed consent was obtained from the patient after explanation of indications, benefits and possible risks and complications of the procedure.  The patient was then taken to the endoscopy suite, placed in the left lateral decubitus position and the above IV sedation was administrered.    The Olympus videoendoscope was placed in the patient's mouth and under direct visualization passed into the esophagus. The scope was then advanced to the 3rd portion of the duodenum without difficulty. Views were good, patient toleration was good.  Retroflexion was performed in the stomach.      Findings:  1.  The esophagus appeared normal without evidence of Thomas's esophagus or reflux esophagitis.  2.  1-2 cm sliding hiatal hernia without Paolo's ulcers  3.  Normal stomach  4. Normal duodenum. The villous pattern appeared normal, but given symptoms, multiple small bowel biopsies were obtained to evaluate for celiac disease.      The scope was then withdrawn back into the stomach, it was decompressed, and the scope was completely withdrawn.    A digital rectal examination was performed and revealed negative without mass, lesions or tenderness.      The Olympus pediatric video colonoscope was placed in the patient's rectum under digital direction and advanced to the cecum. The cecum was identified by characteristic anatomy and ballottment.  The prep was good.  The ileocecal valve was identified and intubated.  The scope was then withdrawn back through the

## 2024-11-29 NOTE — CARE COORDINATION
Case Management Discharge Note          Date / Time of Note: 11/29/2024 3:44 PM                  Patient Name: Kourtney Swann   YOB: 1945  Diagnosis: Hyperventilation [R06.4]  Shortness of breath [R06.02]  Anxiety state [F41.1]  Elevated troponin [R79.89]  Atrial fibrillation with rapid ventricular response (HCC) [I48.91]  Atrial fibrillation with RVR (HCC) [I48.91]   Date / Time: 11/25/2024 12:26 PM    Financial:  Payor: [facility] MEDICARE / Plan: [employer] ESSENTIAL/PLUS / Product Type: *No Product type* /      Pharmacy:     [facility] - [address] - P [phone] -  [phone]  [address]  [facility] [address]  Phone: [phone] Fax: [phone]    [id number] [place], [place] - [address] - P [phone] - F [phone]  [address]  [facility] [address]  Phone: [phone] Fax: [phone]      Assistance purchasing medications?: Potential Assistance Purchasing Medications: No  Assistance provided by Case Management: None at this time    DISCHARGE Disposition: Home- No Services Needed    Transportation:  Transportation PLAN for discharge: friend   Mode of Transport: Private Car  Time of Transport: 4:30-5 pm    IMM Completed:   Yes, Case management has presented and reviewed IMM letter #2.       IMM Letter date given:: 11/29/24   .   Patient and/or family/POA verbalized understanding of their medicare rights and appeal process if needed. Patient and/or family/POA has signed, initialed and placed the date and time on IMM letter #2 on the the appropriate lines. Copy of letter offered and they are aware that the original copy of IMM letter #2 is available prior to discharge from the paper chart on the unit.  Electronic documentation has been entered into epic for IMM letter #2 and original paper copy has been added to the paper chart at the nurses station.     Additional CM Notes:   DC order noted.

## 2024-11-29 NOTE — PROGRESS NOTES
Pt returned from endo and ambulated to restroom independently. Pt vitals stable. RN gave pt turkey sandwich and pudding with her medications. Pt c/o no pain. MD wanted RN to give PO potassium replacement since pt was getting 1 more potassium bag. Potassium lab getting rechecked then pt d/c @ 1630.      Electronically signed by Patricia Flowers RN on 11/29/2024 at 4:00 PM        @1617- Potassium resulted to 4.4.

## 2024-12-02 ENCOUNTER — TELEPHONE (OUTPATIENT)
Dept: CARDIOLOGY CLINIC | Age: 79
End: 2024-12-02

## 2024-12-02 NOTE — TELEPHONE ENCOUNTER
Called and spoke with patient she reports that she had one 45 minute episode of shortness of breath which occurred after going to the restroom.  She denies cough, fever and chills. She denies edema and abdominal bloating. Does not weigh herself. She feels that her shortness of breath was related to anxiety and she reports her dog recently passed after she came home from the hospital. She reports her neighbor and fried stopped over during the episode and she did deep breath calmed and the shortness of breath resolved. She stated that she had similar episodes during her hospitalization and was given medication for anxiety which helped. Encouraged to follow up with PCP Jasmeet Oliveira MD  to discuss treatment for her anxiety .

## 2024-12-02 NOTE — TELEPHONE ENCOUNTER
PT just got out of the hospital Friday evening (stayed almost a week for afib). SOB started back up around 9:30 am this morning. PT is not on oxygen. PT thinks is more anxiety related but, wants to check because of afib Dx. Do we have any recommendations for this PT?

## 2024-12-02 NOTE — TELEPHONE ENCOUNTER
CHF-SOB-EDEMA-WEIGHT GAIN    What type of symptoms are you having?  SOB-no pain noted     Do you have new or worsening edema (swelling)?   If so, where?  Do you wear compression stockings?   Are you elevating your legs?     Have you had recent weight gain?  If so how much and in what time frame?    What is your current weight?   What is your normal (dry) weight?    Do you have new or worsening short of breath?   Is your SOB only with exertion or constant? -Constant    Are you taking a diuretic (water pill)?   Yes    How long have you been having these symptoms?  Started last week and has returned today    Have you had any recent hospitalizations for these symptoms?  Yes

## 2024-12-06 ENCOUNTER — TELEPHONE (OUTPATIENT)
Dept: CARDIOLOGY CLINIC | Age: 79
End: 2024-12-06

## 2024-12-06 ENCOUNTER — TRANSCRIBE ORDERS (OUTPATIENT)
Dept: ADMINISTRATIVE | Age: 79
End: 2024-12-06

## 2024-12-06 DIAGNOSIS — C20 RECTAL CANCER (HCC): Primary | ICD-10-CM

## 2024-12-06 NOTE — TELEPHONE ENCOUNTER
Received email from  advising that they cannot get into contact with PT. She has been disconnected from patch. Called/spoke to PT. She advised that she only wore the patch for about 5 days before being admitted to the hospital. She mailed the device back to . Sent an email back to  updating them with this info.

## 2024-12-09 ENCOUNTER — TELEPHONE (OUTPATIENT)
Dept: SURGERY | Age: 79
End: 2024-12-09

## 2024-12-09 NOTE — TELEPHONE ENCOUNTER
Patient states she is scheduled for a new patient visit on 12/26/2024 with Dr. Herrera. The doctor that completed her Colonoscopy with told her she needs to complete a CT scan and blood work and she would like to know if it has to be completed prior to her new patient visit on 12/26/2024.    Patient can be reached at [phone]

## 2024-12-09 NOTE — TELEPHONE ENCOUNTER
Called Patient but she doesn't have voicemail setup. I just wanted to let the patient know that its ok if she has a appointment for a CT and bloodwork. Patient can be seen without haven't this done yet.

## 2024-12-09 NOTE — TELEPHONE ENCOUNTER
Called Patient but she doesn't have voicemail setup. I just wanted to let the patient know that its ok

## 2024-12-10 RX ORDER — ATORVASTATIN CALCIUM 80 MG/1
80 TABLET, FILM COATED ORAL DAILY
Qty: 30 TABLET | Refills: 5 | Status: SHIPPED | OUTPATIENT
Start: 2024-12-10

## 2024-12-10 RX ORDER — ATORVASTATIN CALCIUM 80 MG/1
80 TABLET, FILM COATED ORAL DAILY
Qty: 90 TABLET | OUTPATIENT
Start: 2024-12-10

## 2024-12-10 NOTE — TELEPHONE ENCOUNTER
Future Appointments   Date Time Provider Department Center   12/19/2024 11:00 AM Merlin Mauro MD [facility]   12/26/2024 10:30 AM Lazarus Herrera MD [facility]   2/3/2025 11:30 AM Jasmeet Oliveira MD [facility] DEP   10/21/2025 11:30 AM Kevon Meneses MD [facility]       Last appt 10/2/24

## 2024-12-10 NOTE — TELEPHONE ENCOUNTER
Pt is requesting a refill for her   atorvastatin (LIPITOR) 80 MG tablet     Anne Sharif    Thank you

## 2024-12-19 ENCOUNTER — OFFICE VISIT (OUTPATIENT)
Dept: CARDIOLOGY CLINIC | Age: 79
End: 2024-12-19

## 2024-12-19 ENCOUNTER — HOSPITAL ENCOUNTER (OUTPATIENT)
Dept: CT IMAGING | Age: 79
Discharge: HOME | End: 2024-12-19
Attending: INTERNAL MEDICINE
Payer: MEDICARE

## 2024-12-19 ENCOUNTER — HOSPITAL ENCOUNTER (EMERGENCY)
Age: 79
Discharge: HOME | End: 2024-12-19
Attending: STUDENT IN AN ORGANIZED HEALTH CARE EDUCATION/TRAINING PROGRAM
Payer: MEDICARE

## 2024-12-19 ENCOUNTER — APPOINTMENT (OUTPATIENT)
Dept: GENERAL RADIOLOGY | Age: 79
End: 2024-12-19
Payer: MEDICARE

## 2024-12-19 VITALS
DIASTOLIC BLOOD PRESSURE: 80 MMHG | BODY MASS INDEX: 30.66 KG/M2 | RESPIRATION RATE: 18 BRPM | TEMPERATURE: 97.9 F | SYSTOLIC BLOOD PRESSURE: 110 MMHG | OXYGEN SATURATION: 100 % | WEIGHT: 157 LBS | HEART RATE: 81 BPM

## 2024-12-19 VITALS
HEART RATE: 65 BPM | WEIGHT: 157.2 LBS | HEIGHT: 60 IN | SYSTOLIC BLOOD PRESSURE: 118 MMHG | DIASTOLIC BLOOD PRESSURE: 64 MMHG | BODY MASS INDEX: 30.86 KG/M2 | OXYGEN SATURATION: 100 %

## 2024-12-19 DIAGNOSIS — I48.0 PAROXYSMAL ATRIAL FIBRILLATION (HCC): Primary | ICD-10-CM

## 2024-12-19 DIAGNOSIS — C20 RECTAL CANCER (HCC): ICD-10-CM

## 2024-12-19 DIAGNOSIS — R06.00 DYSPNEA, UNSPECIFIED TYPE: Primary | ICD-10-CM

## 2024-12-19 LAB
ALBUMIN SERPL-MCNC: 4 G/DL (ref 3.4–5)
ALBUMIN/GLOB SERPL: 1.1 {RATIO} (ref 1.1–2.2)
ALP SERPL-CCNC: 251 U/L (ref 40–129)
ALT SERPL-CCNC: 210 U/L (ref 10–40)
ANION GAP SERPL CALCULATED.3IONS-SCNC: 16 MMOL/L (ref 3–16)
AST SERPL-CCNC: 183 U/L (ref 15–37)
BASOPHILS # BLD: 0.1 K/UL (ref 0–0.2)
BASOPHILS NFR BLD: 1.4 %
BILIRUB SERPL-MCNC: 0.6 MG/DL (ref 0–1)
BUN SERPL-MCNC: 21 MG/DL (ref 7–20)
CALCIUM SERPL-MCNC: 10.3 MG/DL (ref 8.3–10.6)
CHLORIDE SERPL-SCNC: 101 MMOL/L (ref 99–110)
CO2 SERPL-SCNC: 20 MMOL/L (ref 21–32)
CREAT SERPL-MCNC: 1.3 MG/DL (ref 0.6–1.2)
DEPRECATED RDW RBC AUTO: 18.5 % (ref 12.4–15.4)
EKG ATRIAL RATE: 67 BPM
EKG DIAGNOSIS: NORMAL
EKG P AXIS: 69 DEGREES
EKG P-R INTERVAL: 136 MS
EKG Q-T INTERVAL: 456 MS
EKG QRS DURATION: 148 MS
EKG QTC CALCULATION (BAZETT): 481 MS
EKG R AXIS: -61 DEGREES
EKG T AXIS: 53 DEGREES
EKG VENTRICULAR RATE: 67 BPM
EOSINOPHIL # BLD: 0.6 K/UL (ref 0–0.6)
EOSINOPHIL NFR BLD: 7.6 %
GFR SERPLBLD CREATININE-BSD FMLA CKD-EPI: 41 ML/MIN/{1.73_M2}
GLUCOSE SERPL-MCNC: 113 MG/DL (ref 70–99)
HCT VFR BLD AUTO: 35.6 % (ref 36–48)
HGB BLD-MCNC: 11.5 G/DL (ref 12–16)
LYMPHOCYTES # BLD: 1.3 K/UL (ref 1–5.1)
LYMPHOCYTES NFR BLD: 16.6 %
MAGNESIUM SERPL-MCNC: 2.02 MG/DL (ref 1.8–2.4)
MCH RBC QN AUTO: 26 PG (ref 26–34)
MCHC RBC AUTO-ENTMCNC: 32.3 G/DL (ref 31–36)
MCV RBC AUTO: 80.3 FL (ref 80–100)
MONOCYTES # BLD: 1.1 K/UL (ref 0–1.3)
MONOCYTES NFR BLD: 13.6 %
NEUTROPHILS # BLD: 4.8 K/UL (ref 1.7–7.7)
NEUTROPHILS NFR BLD: 60.8 %
NT-PROBNP SERPL-MCNC: 69 PG/ML (ref 0–449)
PLATELET # BLD AUTO: 325 K/UL (ref 135–450)
PMV BLD AUTO: 8.8 FL (ref 5–10.5)
POTASSIUM SERPL-SCNC: 4.3 MMOL/L (ref 3.5–5.1)
PROT SERPL-MCNC: 7.6 G/DL (ref 6.4–8.2)
RBC # BLD AUTO: 4.43 M/UL (ref 4–5.2)
SODIUM SERPL-SCNC: 137 MMOL/L (ref 136–145)
TROPONIN, HIGH SENSITIVITY: 16 NG/L (ref 0–14)
WBC # BLD AUTO: 7.9 K/UL (ref 4–11)

## 2024-12-19 PROCEDURE — 71045 X-RAY EXAM CHEST 1 VIEW: CPT

## 2024-12-19 PROCEDURE — 84484 ASSAY OF TROPONIN QUANT: CPT

## 2024-12-19 PROCEDURE — 83735 ASSAY OF MAGNESIUM: CPT

## 2024-12-19 PROCEDURE — 80053 COMPREHEN METABOLIC PANEL: CPT

## 2024-12-19 PROCEDURE — 93005 ELECTROCARDIOGRAM TRACING: CPT | Performed by: STUDENT IN AN ORGANIZED HEALTH CARE EDUCATION/TRAINING PROGRAM

## 2024-12-19 PROCEDURE — 85025 COMPLETE CBC W/AUTO DIFF WBC: CPT

## 2024-12-19 PROCEDURE — 99285 EMERGENCY DEPT VISIT HI MDM: CPT

## 2024-12-19 PROCEDURE — 93010 ELECTROCARDIOGRAM REPORT: CPT | Performed by: INTERNAL MEDICINE

## 2024-12-19 PROCEDURE — 2580000003 HC RX 258: Performed by: STUDENT IN AN ORGANIZED HEALTH CARE EDUCATION/TRAINING PROGRAM

## 2024-12-19 PROCEDURE — 74176 CT ABD & PELVIS W/O CONTRAST: CPT

## 2024-12-19 PROCEDURE — 83880 ASSAY OF NATRIURETIC PEPTIDE: CPT

## 2024-12-19 RX ORDER — 0.9 % SODIUM CHLORIDE 0.9 %
500 INTRAVENOUS SOLUTION INTRAVENOUS ONCE
Status: COMPLETED | OUTPATIENT
Start: 2024-12-19 | End: 2024-12-19

## 2024-12-19 RX ADMIN — SODIUM CHLORIDE 500 ML: 9 INJECTION, SOLUTION INTRAVENOUS at 14:56

## 2024-12-19 ASSESSMENT — PAIN - FUNCTIONAL ASSESSMENT: PAIN_FUNCTIONAL_ASSESSMENT: NONE - DENIES PAIN

## 2024-12-19 NOTE — DISCHARGE INSTRUCTIONS
You were seen today in the emergency department due to shortness of breath.  Your workup including blood work and imaging was reassuring, however did not show the exact cause of your symptoms.  There was a slight elevation in your liver enzymes which is possibly due to amiodarone.  It is recommended that you continue to follow-up with your cardiologist and regular doctor as scheduled.  Please return to the emergency department if you experience any further concerning symptoms.

## 2024-12-19 NOTE — ED NOTES
Discharge and education instructions reviewed. Patient verbalized understanding, teach-back successful. Patient denied questions at this time. No acute distress noted. Patient instructed to follow-up as noted - return to emergency department if symptoms worsen. Patient verbalized understanding. Discharged per EDMD with discharge instructions.   Talha has taken pt to ct scan where she has an out patient order: Friend to pick pt up after imaging

## 2024-12-19 NOTE — ED PROVIDER NOTES
[facility] EMERGENCY DEPARTMENT      EMERGENCY MEDICINE     Pt Name: Kourtney Swann  MRN: [id number]  Birthdate 1945  Date of evaluation: 12/19/2024  Provider: Hakeem Melton DO    CHIEF COMPLAINT       Chief Complaint   Patient presents with    Shortness of Breath     Pt to ED with c/c of SOB and weakness x 1 week. Pt stated she was sent by Dr. Castro to be assessed, concerned for heart failure. Pt hx afib.      HISTORY OF PRESENT ILLNESS   Kourtney Swann is a 79 y.o. female who presents to the emergency department for shortness of breath and weakness for a week.  Was seen in Dr. Means office earlier today and was sent to the emergency department for evaluation.  She does have a past medical history of longstanding atrial fibrillation and was previously on flecainide.  She is currently on therapeutic Xarelto and also has a history of a right bundle branch block.  She states that she was previously in the hospital but states that after multiple hospitalizations here she is felt that she has been having exertional dyspnea and they were concerned about potential development of heart failure.  She has been wearing a monitor and they were concerned about potentially elevated heart rate.  Of note although she feels short of breath here in the emergency department oxygen saturation is 100% on room air and her heart rate is currently 68 bpm on my evaluation.  She has not been running any fevers or have any productive cough.  She has not gained any weight.  She denies any decrease in her urine output.        PASTMEDICAL HISTORY     Past Medical History:   Diagnosis Date    Atrial fibrillation (HCC)     Brain tumor (benign) (HCC)     MRI every 6 months has swelling which causes the seizure    CAD (coronary artery disease)     Diabetes mellitus (HCC)     Hyperlipidemia     Hypertension     Meningioma (HCC)     RBBB (right bundle branch block)     Seizure (HCC)     2019 and 6/2022

## 2024-12-19 NOTE — PROGRESS NOTES
[facility]   Electrophysiology Consultation     Date: 12/19/2024  Reason for Consultation: Atrial fibrillation.   Consult Requesting Physician: Kevon Meneses MD     CC: Discuss atrial fibrillation      HPI: Kourtney Swann is a 79 y.o. female with a history of hypertension, iron deficiency anemia, hyperlipidemia, seizures, right bundle branch block and left anterior fascicular block, type 2 diabetes, coronary artery disease, paroxysmal atrial fibrillation, repeat monitor with 10.9% atrial fibrillation burden, longest 12 hours and 37 minutes with rates up to the 170s, flecainide was discontinued and amiodarone was started.    Patient presents to the office as follow-up for the management of atrial fibrillation.  Since being discharged from hospital she has complained of fatigue, shortness of breath and weakness with minimal activity.  Denies palpitations or racing heart, interestingly her shortness of breath is reportedly quite severe, she has normal oxygen saturation today in office has been compliant with anticoagulation.    Review of System:  [x] Full ROS obtained and negative except as mentioned in HPI or below      Prior to Admission medications    Medication Sig Start Date End Date Taking? Authorizing Provider   atorvastatin (LIPITOR) 80 MG tablet Take 1 tablet by mouth daily 12/10/24  Yes Jasmeet Oliveira MD   amiodarone (CORDARONE) 200 MG tablet Take 1 tablet twice daily for 7 days, then 1 tablet daily 11/29/24  Yes Scar Keller MD   metoprolol tartrate (LOPRESSOR) 25 MG tablet Take 1 tablet by mouth 2 times daily 11/29/24  Yes Scar Keller MD   ferrous sulfate (IRON 325) 325 (65 Fe) MG tablet Take 1 tablet by mouth daily (with breakfast) 11/29/24  Yes Scar Keller MD   amLODIPine (NORVASC) 10 MG tablet Take 1 tablet by mouth daily 11/19/24 2/17/25 Yes Jasmeet Oliveira MD   losartan-hydroCHLOROthiazide (HYZAAR) 100-25 MG per tablet Take 1 tablet by mouth daily

## 2024-12-23 NOTE — TELEPHONE ENCOUNTER
Medication Refill    When was your last appointment with cardiology?    (If 1 yr or longer, please schedule appointment)    (If patient has been told they do not need to follow-up - medications should be filled by PCP)  12/19/24    When did you last have labs drawn?   12/19/24    Medication needing refilled?  rivaroxaban (XARELTO)     Dosage of the medication?  20 MG TABS tablet     How are you taking this medication (QD, BID, TID, QID, PRN)?  Take 1 tablet by mouth Daily with supper     Do you want a 30 or 90 day supply?  30    When will you run out of your medication?   2 days - 12/25    Which Pharmacy are we sending this medication to?  [id number] [place] [address]     Pharmacy Phone:[phone]   Pharmacy Fax:[phone]

## 2024-12-23 NOTE — TELEPHONE ENCOUNTER
Dr. Mauro,    Patient is requesting refill on her Xarelto.    I wanted to double check with you as I believe patient may be indicated for Xarelto 15 mg daily.      I calculated her creatine clearance based on results of her most recent lab 12/19/2024  which showed creatine of 1.3 and below are the results.  Note her previous creatine on 11/19/2024 was normal at 0.9.                    Please advise if should change RX to Xarelto 15 mg daily.     Thanks,  Dior Denny, RN

## 2024-12-24 NOTE — TELEPHONE ENCOUNTER
Called spoke with patient is aware to take Xarelto 15 mg daily.    States understanding. States she will need refill sent to her local pharmacy.     RX pending.

## 2024-12-24 NOTE — TELEPHONE ENCOUNTER
Rx has been sent.     Call placed to the patient to advise her of this message. Advised not to take with 20 mg dosing once received. She voiced understanding. Call complete

## 2024-12-26 ENCOUNTER — OFFICE VISIT (OUTPATIENT)
Dept: SURGERY | Age: 79
End: 2024-12-26
Payer: MEDICARE

## 2024-12-26 ENCOUNTER — TELEPHONE (OUTPATIENT)
Dept: SURGERY | Age: 79
End: 2024-12-26

## 2024-12-26 VITALS
HEART RATE: 69 BPM | BODY MASS INDEX: 30.82 KG/M2 | OXYGEN SATURATION: 96 % | SYSTOLIC BLOOD PRESSURE: 126 MMHG | WEIGHT: 157 LBS | DIASTOLIC BLOOD PRESSURE: 78 MMHG | HEIGHT: 60 IN

## 2024-12-26 DIAGNOSIS — E11.9 TYPE 2 DIABETES MELLITUS WITHOUT COMPLICATION, WITHOUT LONG-TERM CURRENT USE OF INSULIN (HCC): ICD-10-CM

## 2024-12-26 DIAGNOSIS — C20 RECTAL CANCER (HCC): Primary | ICD-10-CM

## 2024-12-26 DIAGNOSIS — C20 RECTAL CANCER (HCC): ICD-10-CM

## 2024-12-26 PROBLEM — R79.89 ELEVATED TROPONIN: Status: RESOLVED | Noted: 2024-11-26 | Resolved: 2024-12-26

## 2024-12-26 LAB — CEA SERPL-MCNC: 2.1 NG/ML (ref 0–5)

## 2024-12-26 PROCEDURE — 3074F SYST BP LT 130 MM HG: CPT | Performed by: SURGERY

## 2024-12-26 PROCEDURE — 99205 OFFICE O/P NEW HI 60 MIN: CPT | Performed by: SURGERY

## 2024-12-26 PROCEDURE — 3044F HG A1C LEVEL LT 7.0%: CPT | Performed by: SURGERY

## 2024-12-26 PROCEDURE — 1159F MED LIST DOCD IN RCRD: CPT | Performed by: SURGERY

## 2024-12-26 PROCEDURE — 3078F DIAST BP <80 MM HG: CPT | Performed by: SURGERY

## 2024-12-26 PROCEDURE — 1123F ACP DISCUSS/DSCN MKR DOCD: CPT | Performed by: SURGERY

## 2024-12-26 NOTE — PATIENT INSTRUCTIONS
anesthesiologist on the day of surgery. Some of these risks include changes in breathing or circulation, allergic reactions to medications, need to be on the ventilator after surgery.    Please reach out to your surgeon if you have any questions about these risks and complications.    WHAT FACTORS INFLUENCE PROGNOSIS (OUTCOME)?    The outcome of patients with rectal cancer is most clearly related to the stage at the time of diagnosis, with cancer that is confined to the lining of colon having the best chance of success. This is one reason why early detection through screening methods like colonoscopy is crucial.    WHAT FOLLOW-UP IS NEEDED AFTER TREATMENT?    After treatment for rectal cancer, a blood test to measure amounts of CEA (a substance in the blood that may be increased when cancer is present) may be done to see if the cancer has come back. Routine CT scans, clinical examinations, and colonoscopy are also performed at intervals determined by the stage.    WHAT IS A COLON AND RECTAL SURGEON?    Colon and rectal surgeons are experts in the surgical and non-surgical treatment of diseases of the colon, rectum and anus. They have completed advanced surgical training in the treatment of these diseases as well as full general surgical training. Board-certified colon and rectal surgeons complete residencies in general surgery and colon and rectal surgery, and pass intensive examinations conducted by the American Board of Surgery and the American Board of Colon and Rectal Surgery. They are well-versed in the treatment of both benign and malignant diseases of the colon, rectum and anus and are able to perform routine screening examinations and surgically treat conditions if indicated to do so.

## 2024-12-26 NOTE — TELEPHONE ENCOUNTER
Spoke to patient to discuss scheduling MRI Pelvis and CT Chest. I told her I will reach out to the radiology department at [facility] to schedule and will call back either today or tomorrow with a date/time. Patient verbalized understanding.     Patient got CEA today at [facility] after lab visit with Dr. Herrera.

## 2024-12-26 NOTE — PROGRESS NOTES
rectal cancer.  We discussed the pathophysiology, etiology, workup, natural history, treatment options including medical and surgical pathways.    At this point, we will need to obtain additional information with staging workup.  I have ordered CEA, MRI pelvis, CT chest to complete her staging.  She will need discussion of rectal cancer tumor board, hopefully in 2 weeks.  Assuming staging workup does not show any signs of metastatic disease or any other concerning findings such as perirectal lymphadenopathy, I would then recommend a transanal reexcision to negative margins.  From there we can determine if any further invasive treatment is needed such as definitive low anterior resection versus APR, or adjuvant chemotherapy or chemoradiation.  If she does have suspicious findings on her staging workup, she may need PATIENCE before surgery.    I did discuss that given all her rectal cancer tumor board at [facility], she would need to obtain her MRI at that location, and likely plan for her transanal excision there as well. She did discuss with me concerns regarding finding someone to drive her across town.    Continue with current medications    I provided written information in the After Visit Summary AVS Regarding: Rectal cancer    DISPOSITION: Staging workup, tumor board discussion, possible transanal excision    My findings will be relayed to consulting practitioner or PCP via Epic    Note completed using dictation software, please excuse any errors.    Electronically signed by Lazarus Herrera MD on 12/26/2024 at 11:15 AM

## 2024-12-27 NOTE — TELEPHONE ENCOUNTER
Patient's contact, Ning, called stating Dr. Herrera instructed them to get the imaging done at [facility]. Ning states that it is really hard for them to get over here, and they would prefer it to be done at [facility]. She is wanting to know if there is a specific reason that it needs to be done at [facility]? She said Dr. Herrera also mentioned a scraping being done at [facility], she states it would need to be done at [place] as well. She wants to know if this is possible, if not they will have to find the patient a new provider due to the location.    Please f/u with Ning [phone]

## 2024-12-27 NOTE — TELEPHONE ENCOUNTER
Spoke to patient, she is unable to get a ride to [facility] for imaging. She would like to know what her other options are. I informed her I will discuss with Dr. Herrera to see how he would like to proceed.

## 2024-12-31 NOTE — TELEPHONE ENCOUNTER
Spoke to patient, she has informed me that after thinking things over she does not wish to proceed with any further testing or surgery. She stated she appreciated everything our office has done for her but she does not want any further treatment at this time. I told her that if she does change her mind in the future to give our office a call.

## 2025-02-03 ENCOUNTER — OFFICE VISIT (OUTPATIENT)
Dept: INTERNAL MEDICINE CLINIC | Age: 80
End: 2025-02-03
Payer: MEDICARE

## 2025-02-03 VITALS
DIASTOLIC BLOOD PRESSURE: 66 MMHG | SYSTOLIC BLOOD PRESSURE: 156 MMHG | OXYGEN SATURATION: 90 % | HEART RATE: 99 BPM | WEIGHT: 157 LBS | BODY MASS INDEX: 30.66 KG/M2

## 2025-02-03 DIAGNOSIS — I48.0 PAROXYSMAL ATRIAL FIBRILLATION (HCC): ICD-10-CM

## 2025-02-03 DIAGNOSIS — E11.9 TYPE 2 DIABETES MELLITUS WITHOUT COMPLICATION, WITHOUT LONG-TERM CURRENT USE OF INSULIN (HCC): ICD-10-CM

## 2025-02-03 DIAGNOSIS — I10 PRIMARY HYPERTENSION: Primary | ICD-10-CM

## 2025-02-03 LAB
25(OH)D3 SERPL-MCNC: 65.7 NG/ML
ALBUMIN SERPL-MCNC: 4.2 G/DL (ref 3.4–5)
ALBUMIN/GLOB SERPL: 1.4 {RATIO} (ref 1.1–2.2)
ALP SERPL-CCNC: 93 U/L (ref 40–129)
ALT SERPL-CCNC: 40 U/L (ref 10–40)
ANION GAP SERPL CALCULATED.3IONS-SCNC: 13 MMOL/L (ref 3–16)
AST SERPL-CCNC: 43 U/L (ref 15–37)
BASOPHILS # BLD: 0.1 K/UL (ref 0–0.2)
BASOPHILS NFR BLD: 1.1 %
BILIRUB SERPL-MCNC: 0.3 MG/DL (ref 0–1)
BUN SERPL-MCNC: 19 MG/DL (ref 7–20)
CALCIUM SERPL-MCNC: 10.1 MG/DL (ref 8.3–10.6)
CHLORIDE SERPL-SCNC: 105 MMOL/L (ref 99–110)
CO2 SERPL-SCNC: 23 MMOL/L (ref 21–32)
CREAT SERPL-MCNC: 1.1 MG/DL (ref 0.6–1.2)
DEPRECATED RDW RBC AUTO: 20.6 % (ref 12.4–15.4)
EOSINOPHIL # BLD: 0.2 K/UL (ref 0–0.6)
EOSINOPHIL NFR BLD: 3.4 %
EST. AVERAGE GLUCOSE BLD GHB EST-MCNC: 102.5 MG/DL
GFR SERPLBLD CREATININE-BSD FMLA CKD-EPI: 50 ML/MIN/{1.73_M2}
GLUCOSE SERPL-MCNC: 89 MG/DL (ref 70–99)
HBA1C MFR BLD: 5.2 %
HCT VFR BLD AUTO: 34.9 % (ref 36–48)
HGB BLD-MCNC: 12.1 G/DL (ref 12–16)
LYMPHOCYTES # BLD: 1.7 K/UL (ref 1–5.1)
LYMPHOCYTES NFR BLD: 24 %
MCH RBC QN AUTO: 28.6 PG (ref 26–34)
MCHC RBC AUTO-ENTMCNC: 34.6 G/DL (ref 31–36)
MCV RBC AUTO: 82.5 FL (ref 80–100)
MONOCYTES # BLD: 0.8 K/UL (ref 0–1.3)
MONOCYTES NFR BLD: 11.4 %
NEUTROPHILS # BLD: 4.3 K/UL (ref 1.7–7.7)
NEUTROPHILS NFR BLD: 60.1 %
PLATELET # BLD AUTO: 297 K/UL (ref 135–450)
PMV BLD AUTO: 9.2 FL (ref 5–10.5)
POTASSIUM SERPL-SCNC: 3.7 MMOL/L (ref 3.5–5.1)
PROT SERPL-MCNC: 7.2 G/DL (ref 6.4–8.2)
RBC # BLD AUTO: 4.23 M/UL (ref 4–5.2)
SODIUM SERPL-SCNC: 141 MMOL/L (ref 136–145)
WBC # BLD AUTO: 7.1 K/UL (ref 4–11)

## 2025-02-03 PROCEDURE — 36415 COLL VENOUS BLD VENIPUNCTURE: CPT | Performed by: STUDENT IN AN ORGANIZED HEALTH CARE EDUCATION/TRAINING PROGRAM

## 2025-02-03 PROCEDURE — 3044F HG A1C LEVEL LT 7.0%: CPT | Performed by: STUDENT IN AN ORGANIZED HEALTH CARE EDUCATION/TRAINING PROGRAM

## 2025-02-03 PROCEDURE — 3077F SYST BP >= 140 MM HG: CPT | Performed by: STUDENT IN AN ORGANIZED HEALTH CARE EDUCATION/TRAINING PROGRAM

## 2025-02-03 PROCEDURE — 1123F ACP DISCUSS/DSCN MKR DOCD: CPT | Performed by: STUDENT IN AN ORGANIZED HEALTH CARE EDUCATION/TRAINING PROGRAM

## 2025-02-03 PROCEDURE — 3078F DIAST BP <80 MM HG: CPT | Performed by: STUDENT IN AN ORGANIZED HEALTH CARE EDUCATION/TRAINING PROGRAM

## 2025-02-03 PROCEDURE — 99213 OFFICE O/P EST LOW 20 MIN: CPT | Performed by: STUDENT IN AN ORGANIZED HEALTH CARE EDUCATION/TRAINING PROGRAM

## 2025-02-03 PROCEDURE — 1159F MED LIST DOCD IN RCRD: CPT | Performed by: STUDENT IN AN ORGANIZED HEALTH CARE EDUCATION/TRAINING PROGRAM

## 2025-02-03 RX ORDER — LEVETIRACETAM 500 MG/1
500 TABLET ORAL 2 TIMES DAILY
Qty: 180 TABLET | Refills: 3 | Status: SHIPPED | OUTPATIENT
Start: 2025-02-03

## 2025-02-03 ASSESSMENT — PATIENT HEALTH QUESTIONNAIRE - PHQ9
SUM OF ALL RESPONSES TO PHQ QUESTIONS 1-9: 2
SUM OF ALL RESPONSES TO PHQ9 QUESTIONS 1 & 2: 2
1. LITTLE INTEREST OR PLEASURE IN DOING THINGS: SEVERAL DAYS
SUM OF ALL RESPONSES TO PHQ QUESTIONS 1-9: 2
2. FEELING DOWN, DEPRESSED OR HOPELESS: SEVERAL DAYS

## 2025-02-03 NOTE — ASSESSMENT & PLAN NOTE
Orders:    Vitamin D 25 Hydroxy    Comprehensive Metabolic Panel    CBC with Auto Differential    Hemoglobin A1C

## 2025-02-03 NOTE — PROGRESS NOTES
Kourtney Swann is a 79 y.o. female with a past medical history noted below who presents for routine follow up on chronic conditions and discussion of preventative health care.  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 No acute distress  Eyes: PEERL. No scleral icterus noted. Normal appearing conjunctiva bilaterally  Ears: Normal TM and external canal bilaterally.  Oral cavity/Throat: No pharyngeal erythema. No oral lesions.  Cardiovascular: Heart is regular rate and rhythm. No murmurs noted. Radial pulses 2+. Posterior tibial pulses 2+. No lower extremity edema noted  Pulmonary: Lungs clear to auscultation bilaterally  Skin: Dry, warm. No obvious skin lesions or rashes noted.  Neuro: PEERL. EOM intact. No facial droop. Normal sensation in bilateral upper and lower extremities. Gait is normal    Assessment and Plan  Kourtney Swannpresents to clinic for follow-up on chronic conditions listed above below and discussion of preventative care

## 2025-02-03 NOTE — ASSESSMENT & PLAN NOTE
T2DM: Patient with history of type 2  diabetes.Patient's last a1c was   Hemoglobin A1C   Date Value Ref Range Status   02/03/2025 5.2 See comment % Final     Comment:     Comment:  Diagnosis of Diabetes: > or = 6.5%  Increased risk of diabetes (Prediabetes): 5.7-6.4%  Glycemic Control: Nonpregnant Adults: <7.0%                    Pregnant: <6.0%       . Patient is compliant with   Key Antihyperglycemic Medications               metFORMIN (GLUCOPHAGE-XR) 500 MG extended release tablet (Taking) Take 3 tablets by mouth daily (with breakfast)        . Patient does not monitor blood sugars at home. Patient does not have neuropathy, nephropathy, or history of vascular disorder.

## 2025-02-11 ENCOUNTER — TELEPHONE (OUTPATIENT)
Dept: INTERNAL MEDICINE CLINIC | Age: 80
End: 2025-02-11

## 2025-02-11 NOTE — TELEPHONE ENCOUNTER
Pt called, she is not feeling well. Sinus issues, thick mucus, sore throat, cough    No fever, no headache    Has tried mucinex, saline solution for her nose, Tylenol sinus    Please advise    Thank you    Anne Sharif

## 2025-02-14 NOTE — TELEPHONE ENCOUNTER
Medication:   Requested Prescriptions     Pending Prescriptions Disp Refills    amLODIPine (NORVASC) 10 MG tablet 90 tablet 0     Sig: Take 1 tablet by mouth daily    losartan-hydroCHLOROthiazide (HYZAAR) 100-25 MG per tablet 90 tablet 0     Sig: Take 1 tablet by mouth daily       Last Filled:      Patient Phone Number: [phone] (home)     Last appt: 2/3/2025   Next appt: 6/3/2025  Future Appointments   Date Time Provider Department Center   6/3/2025 11:15 AM Jasmeet Oliveira MD [facility]   10/21/2025 11:30 AM Kevon Meneses MD [facility]

## 2025-02-17 RX ORDER — AMLODIPINE BESYLATE 10 MG/1
10 TABLET ORAL DAILY
Qty: 90 TABLET | Refills: 0 | Status: SHIPPED | OUTPATIENT
Start: 2025-02-17 | End: 2025-05-18

## 2025-02-17 RX ORDER — LOSARTAN POTASSIUM AND HYDROCHLOROTHIAZIDE 25; 100 MG/1; MG/1
1 TABLET ORAL DAILY
Qty: 90 TABLET | Refills: 0 | Status: SHIPPED | OUTPATIENT
Start: 2025-02-17 | End: 2025-05-18

## 2025-02-24 RX ORDER — METFORMIN HYDROCHLORIDE 500 MG/1
1500 TABLET, EXTENDED RELEASE ORAL
Qty: 270 TABLET | Refills: 0 | Status: SHIPPED | OUTPATIENT
Start: 2025-02-24 | End: 2025-05-25

## 2025-02-24 NOTE — TELEPHONE ENCOUNTER
Medication:   Requested Prescriptions     Pending Prescriptions Disp Refills    metFORMIN (GLUCOPHAGE-XR) 500 MG extended release tablet 270 tablet 0     Sig: Take 3 tablets by mouth daily (with breakfast)       Last Filled:      Patient Phone Number: [phone] (home)     Last appt: 2/3/2025   Next appt: 6/3/2025  Future Appointments   Date Time Provider Department Center   6/3/2025 11:15 AM Jasmeet Oliveira MD [facility]   10/21/2025 11:30 AM Kevon Meneses MD [facility]

## 2025-03-28 NOTE — TELEPHONE ENCOUNTER
Pt is requesting a refill for   metoprolol tartrate (LOPRESSOR) 25 MG tablet      Please send to Anne on [address]    Thank you

## 2025-03-30 RX ORDER — METOPROLOL TARTRATE 25 MG/1
25 TABLET, FILM COATED ORAL 2 TIMES DAILY
Qty: 60 TABLET | Refills: 3 | Status: SHIPPED | OUTPATIENT
Start: 2025-03-30

## 2025-05-20 RX ORDER — LOSARTAN POTASSIUM AND HYDROCHLOROTHIAZIDE 25; 100 MG/1; MG/1
1 TABLET ORAL DAILY
Qty: 90 TABLET | Refills: 0 | Status: SHIPPED | OUTPATIENT
Start: 2025-05-20 | End: 2025-08-18

## 2025-05-20 RX ORDER — AMLODIPINE BESYLATE 10 MG/1
10 TABLET ORAL DAILY
Qty: 90 TABLET | Refills: 0 | Status: SHIPPED | OUTPATIENT
Start: 2025-05-20 | End: 2025-08-18

## 2025-05-20 NOTE — TELEPHONE ENCOUNTER
Future Appointments   Date Time Provider Department Center   6/3/2025 11:15 AM Jasmeet Oliveira MD [facility] DEP   10/21/2025 11:30 AM Kevon Meneses MD [facility]       Last appt 2/3/25

## 2025-05-20 NOTE — TELEPHONE ENCOUNTER
Future Appointments   Date Time Provider Department Center   6/3/2025 11:15 AM Jasmeet Oliveira MD [facility] DEP   10/21/2025 11:30 AM Kevon Meneses MD [facility]       Last appt 2/3/25

## 2025-05-27 NOTE — TELEPHONE ENCOUNTER
Future Appointments   Date Time Provider Department Center   6/3/2025 11:15 AM Jasmeet Oliveira MD [facility]   10/21/2025 11:30 AM Kevon Meneses MD [facility]       Last appt 2/3/25

## 2025-05-28 RX ORDER — METFORMIN HYDROCHLORIDE 500 MG/1
TABLET, EXTENDED RELEASE ORAL
Qty: 270 TABLET | Refills: 0 | Status: SHIPPED | OUTPATIENT
Start: 2025-05-28

## 2025-06-03 ENCOUNTER — OFFICE VISIT (OUTPATIENT)
Dept: INTERNAL MEDICINE CLINIC | Age: 80
End: 2025-06-03
Payer: MEDICARE

## 2025-06-03 VITALS
DIASTOLIC BLOOD PRESSURE: 80 MMHG | HEIGHT: 60 IN | SYSTOLIC BLOOD PRESSURE: 138 MMHG | OXYGEN SATURATION: 95 % | HEART RATE: 59 BPM | WEIGHT: 164 LBS | BODY MASS INDEX: 32.2 KG/M2

## 2025-06-03 DIAGNOSIS — G40.309 NONINTRACTABLE GENERALIZED IDIOPATHIC EPILEPSY WITHOUT STATUS EPILEPTICUS (HCC): ICD-10-CM

## 2025-06-03 DIAGNOSIS — I48.0 PAROXYSMAL ATRIAL FIBRILLATION (HCC): ICD-10-CM

## 2025-06-03 DIAGNOSIS — I10 PRIMARY HYPERTENSION: Primary | ICD-10-CM

## 2025-06-03 DIAGNOSIS — E11.9 TYPE 2 DIABETES MELLITUS WITHOUT COMPLICATION, WITHOUT LONG-TERM CURRENT USE OF INSULIN (HCC): ICD-10-CM

## 2025-06-03 LAB — HBA1C MFR BLD: 5.6 %

## 2025-06-03 PROCEDURE — 1123F ACP DISCUSS/DSCN MKR DOCD: CPT | Performed by: STUDENT IN AN ORGANIZED HEALTH CARE EDUCATION/TRAINING PROGRAM

## 2025-06-03 PROCEDURE — 3079F DIAST BP 80-89 MM HG: CPT | Performed by: STUDENT IN AN ORGANIZED HEALTH CARE EDUCATION/TRAINING PROGRAM

## 2025-06-03 PROCEDURE — 99213 OFFICE O/P EST LOW 20 MIN: CPT | Performed by: STUDENT IN AN ORGANIZED HEALTH CARE EDUCATION/TRAINING PROGRAM

## 2025-06-03 PROCEDURE — 1160F RVW MEDS BY RX/DR IN RCRD: CPT | Performed by: STUDENT IN AN ORGANIZED HEALTH CARE EDUCATION/TRAINING PROGRAM

## 2025-06-03 PROCEDURE — 3044F HG A1C LEVEL LT 7.0%: CPT | Performed by: STUDENT IN AN ORGANIZED HEALTH CARE EDUCATION/TRAINING PROGRAM

## 2025-06-03 PROCEDURE — 83036 HEMOGLOBIN GLYCOSYLATED A1C: CPT | Performed by: STUDENT IN AN ORGANIZED HEALTH CARE EDUCATION/TRAINING PROGRAM

## 2025-06-03 PROCEDURE — 3075F SYST BP GE 130 - 139MM HG: CPT | Performed by: STUDENT IN AN ORGANIZED HEALTH CARE EDUCATION/TRAINING PROGRAM

## 2025-06-03 PROCEDURE — 1159F MED LIST DOCD IN RCRD: CPT | Performed by: STUDENT IN AN ORGANIZED HEALTH CARE EDUCATION/TRAINING PROGRAM

## 2025-06-03 RX ORDER — METFORMIN HYDROCHLORIDE 500 MG/1
1000 TABLET, EXTENDED RELEASE ORAL
Qty: 60 TABLET | Refills: 0
Start: 2025-06-03

## 2025-06-03 RX ORDER — ERYTHROMYCIN 5 MG/G
OINTMENT OPHTHALMIC
Qty: 3.5 G | Refills: 0 | Status: SHIPPED | OUTPATIENT
Start: 2025-06-03

## 2025-06-03 SDOH — ECONOMIC STABILITY: FOOD INSECURITY: WITHIN THE PAST 12 MONTHS, YOU WORRIED THAT YOUR FOOD WOULD RUN OUT BEFORE YOU GOT MONEY TO BUY MORE.: NEVER TRUE

## 2025-06-03 SDOH — ECONOMIC STABILITY: FOOD INSECURITY: WITHIN THE PAST 12 MONTHS, THE FOOD YOU BOUGHT JUST DIDN'T LAST AND YOU DIDN'T HAVE MONEY TO GET MORE.: NEVER TRUE

## 2025-06-03 ASSESSMENT — PATIENT HEALTH QUESTIONNAIRE - PHQ9
SUM OF ALL RESPONSES TO PHQ QUESTIONS 1-9: 0
SUM OF ALL RESPONSES TO PHQ QUESTIONS 1-9: 0
2. FEELING DOWN, DEPRESSED OR HOPELESS: NOT AT ALL
SUM OF ALL RESPONSES TO PHQ QUESTIONS 1-9: 0
1. LITTLE INTEREST OR PLEASURE IN DOING THINGS: NOT AT ALL
SUM OF ALL RESPONSES TO PHQ QUESTIONS 1-9: 0

## 2025-06-03 NOTE — ASSESSMENT & PLAN NOTE
Blood pressure is well controlled  - Continue losartan hydrochlorothiazide, amlodipine, and metoprolol

## 2025-06-03 NOTE — ASSESSMENT & PLAN NOTE
T2DM: Patient with history of type 2  diabetes.Patient's last a1c was   Hemoglobin A1C   Date Value Ref Range Status   06/03/2025 5.6 % Final   . Patient is compliant with   Key Antihyperglycemic Medications              metFORMIN (GLUCOPHAGE-XR) 500 MG extended release tablet (Taking) TAKE 3 TABLETS BY MOUTH DAILY WITH BREAKFAST        Well controlled    Orders:    POCT glycosylated hemoglobin (Hb A1C)

## 2025-06-03 NOTE — PROGRESS NOTES
Kourtney Swann is a 79 y.o. female with a past medical history noted below who presents for routine follow up on chronic conditions and discussion of preventative health care.  Chief Complaint   Patient presents with    Hypertension    Follow-up    Diabetes   Patient is feeling well. She notes that she has some insomnia. She denies anxiety or frequent urination. She does takes naps during the day.         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 No acute distress  Eyes: PEERL. No scleral icterus noted. Normal appearing conjunctiva bilaterally  Ears: Normal TM and external canal bilaterally.  Oral cavity/Throat: No pharyngeal erythema. No oral lesions.  Cardiovascular: Heart is regular rate and rhythm. No murmurs noted. Radial pulses 2+. Posterior tibial pulses 2+. No lower extremity edema noted  Pulmonary: Lungs clear to auscultation bilaterally  Skin: Dry, warm. No obvious skin lesions or rashes noted.  Neuro: PEERL. EOM intact. No facial droop. Normal sensation in bilateral upper and lower extremities. Gait is normal    Assessment and Plan  Kourtney Swannpresents

## 2025-06-20 RX ORDER — LEVETIRACETAM 500 MG/1
500 TABLET ORAL 2 TIMES DAILY
Qty: 180 TABLET | Refills: 3 | Status: SHIPPED | OUTPATIENT
Start: 2025-06-20

## 2025-06-20 NOTE — TELEPHONE ENCOUNTER
Medication:   Requested Prescriptions     Pending Prescriptions Disp Refills    levETIRAcetam (KEPPRA) 500 MG tablet 180 tablet 3     Sig: Take 1 tablet by mouth 2 times daily       Last Filled:      Patient Phone Number: [phone] (home)     Last appt: 6/3/2025   Next appt: 10/3/2025  Future Appointments   Date Time Provider Department Center   10/3/2025 11:30 AM Jasmeet Oliveira MD [facility]   10/21/2025 11:30 AM Kevon Meneses MD [facility]

## 2025-06-23 RX ORDER — ATORVASTATIN CALCIUM 80 MG/1
80 TABLET, FILM COATED ORAL DAILY
Qty: 30 TABLET | Refills: 0 | Status: SHIPPED | OUTPATIENT
Start: 2025-06-23

## 2025-06-23 NOTE — TELEPHONE ENCOUNTER
Future Appointments   Date Time Provider Department Center   10/3/2025 11:30 AM Jasmeet Oliveira MD [facility] DE[facility]   10/21/2025 11:30 AM Kevon Meneses MD [facility]

## 2025-07-01 ENCOUNTER — OFFICE VISIT (OUTPATIENT)
Dept: INTERNAL MEDICINE CLINIC | Age: 80
End: 2025-07-01
Payer: MEDICARE

## 2025-07-01 VITALS
WEIGHT: 161 LBS | TEMPERATURE: 97.7 F | BODY MASS INDEX: 31.61 KG/M2 | SYSTOLIC BLOOD PRESSURE: 130 MMHG | HEART RATE: 67 BPM | OXYGEN SATURATION: 95 % | HEIGHT: 60 IN | DIASTOLIC BLOOD PRESSURE: 62 MMHG

## 2025-07-01 DIAGNOSIS — R53.83 FATIGUE, UNSPECIFIED TYPE: ICD-10-CM

## 2025-07-01 DIAGNOSIS — L30.9 DERMATITIS: Primary | ICD-10-CM

## 2025-07-01 PROCEDURE — 3075F SYST BP GE 130 - 139MM HG: CPT | Performed by: NURSE PRACTITIONER

## 2025-07-01 PROCEDURE — 1160F RVW MEDS BY RX/DR IN RCRD: CPT | Performed by: NURSE PRACTITIONER

## 2025-07-01 PROCEDURE — 1159F MED LIST DOCD IN RCRD: CPT | Performed by: NURSE PRACTITIONER

## 2025-07-01 PROCEDURE — 99213 OFFICE O/P EST LOW 20 MIN: CPT | Performed by: NURSE PRACTITIONER

## 2025-07-01 PROCEDURE — 3078F DIAST BP <80 MM HG: CPT | Performed by: NURSE PRACTITIONER

## 2025-07-01 PROCEDURE — 1123F ACP DISCUSS/DSCN MKR DOCD: CPT | Performed by: NURSE PRACTITIONER

## 2025-07-01 RX ORDER — TRIAMCINOLONE ACETONIDE 1 MG/G
OINTMENT TOPICAL 2 TIMES DAILY
Qty: 80 G | Refills: 0 | Status: SHIPPED | OUTPATIENT
Start: 2025-07-01 | End: 2025-07-08

## 2025-07-01 ASSESSMENT — ENCOUNTER SYMPTOMS: SHORTNESS OF BREATH: 0

## 2025-07-01 NOTE — PATIENT INSTRUCTIONS
Start triamcinolone ointment (steroid) twice a day   If rash changes or worsens, please let us know

## 2025-07-01 NOTE — PROGRESS NOTES
Date: 7/1/2025                                               Subjective/Objective:     Chief Complaint   Patient presents with    Rash     On chest and back of both arms for 3 days , itchy and stings    Fatigue       HPI    Kourtney Swann is a 81 yo female, visit today for c/o rash. This problem began 3 days ago and has been not worsening nor imrpoving since that time. This is on her arms and chest. This is mostly on the left side. It feels like a sunburn. She c/o associated pruritus. She denies associated fever/chills. She denies new medications, soaps lotions or detergents    Has been feeling fatigued for 1-2 weeks. Usually sleeps well at night time. Denies chest pain, SOB.            Patient Active Problem List    Diagnosis Date Noted    Meningioma (HCC) 06/14/2022    Nonintractable generalized idiopathic epilepsy without status epilepticus (HCC) 06/14/2022    Diabetes mellitus (HCC)     Iron deficiency anemia 11/29/2024    Atrial fibrillation with RVR (HCC) 11/25/2024    History of atrial fibrillation 10/17/2024    Shortness of breath 07/29/2024    Primary hypertension 07/29/2024       Past Medical History:   Diagnosis Date    Atrial fibrillation (HCC)     Brain tumor (benign) (HCC)     MRI every 6 months has swelling which causes the seizure    CAD (coronary artery disease)     Diabetes mellitus (HCC)     Hyperlipidemia     Hypertension     Meningioma (HCC)     RBBB (right bundle branch block)     Seizure (HCC)     2019 and 6/2022       Past Surgical History:   Procedure Laterality Date    CARDIOVERSION      COLONOSCOPY N/A 11/29/2024    COLONOSCOPY POLYPECTOMY HOT SNARE/BIOPSY WITH CLIP PLACEMENT X2 TO PREVENT BLEED performed by Scar Jacobo MD at [facility] ENDOSCOPY    HYSTERECTOMY (CERVIX STATUS UNKNOWN)      INTRACAPSULAR CATARACT EXTRACTION Left 8/8/2022    PHACOEMULSIFICATION WITH INTRAOCULAR LENS IMPLANT - LEFT EYE performed by Jose Luis Platt MD at [facility]    INTRACAPSULAR CATARACT

## 2025-07-02 ENCOUNTER — LAB (OUTPATIENT)
Dept: INTERNAL MEDICINE CLINIC | Age: 80
End: 2025-07-02
Payer: MEDICARE

## 2025-07-02 DIAGNOSIS — R53.83 FATIGUE, UNSPECIFIED TYPE: ICD-10-CM

## 2025-07-02 LAB
ALBUMIN SERPL-MCNC: 4 G/DL (ref 3.4–5)
ALBUMIN/GLOB SERPL: 1.1 {RATIO} (ref 1.1–2.2)
ALP SERPL-CCNC: 139 U/L (ref 40–129)
ALT SERPL-CCNC: 46 U/L (ref 10–40)
ANION GAP SERPL CALCULATED.3IONS-SCNC: 14 MMOL/L (ref 3–16)
AST SERPL-CCNC: 48 U/L (ref 15–37)
BASOPHILS # BLD: 0 K/UL (ref 0–0.2)
BASOPHILS NFR BLD: 0 %
BILIRUB SERPL-MCNC: 0.4 MG/DL (ref 0–1)
BUN SERPL-MCNC: 26 MG/DL (ref 7–20)
CALCIUM SERPL-MCNC: 10.4 MG/DL (ref 8.3–10.6)
CHLORIDE SERPL-SCNC: 102 MMOL/L (ref 99–110)
CO2 SERPL-SCNC: 25 MMOL/L (ref 21–32)
CREAT SERPL-MCNC: 1.2 MG/DL (ref 0.6–1.2)
DEPRECATED RDW RBC AUTO: 13.3 % (ref 12.4–15.4)
EOSINOPHIL # BLD: 0.1 K/UL (ref 0–0.6)
EOSINOPHIL NFR BLD: 1 %
GFR SERPLBLD CREATININE-BSD FMLA CKD-EPI: 45 ML/MIN/{1.73_M2}
GLUCOSE SERPL-MCNC: 102 MG/DL (ref 70–99)
HCT VFR BLD AUTO: 38 % (ref 36–48)
HGB BLD-MCNC: 12.8 G/DL (ref 12–16)
LYMPHOCYTES # BLD: 1 K/UL (ref 1–5.1)
LYMPHOCYTES NFR BLD: 6 %
MCH RBC QN AUTO: 29.4 PG (ref 26–34)
MCHC RBC AUTO-ENTMCNC: 33.7 G/DL (ref 31–36)
MCV RBC AUTO: 87.4 FL (ref 80–100)
MONOCYTES # BLD: 1.2 K/UL (ref 0–1.3)
MONOCYTES NFR BLD: 9 %
NEUTROPHILS # BLD: 11.4 K/UL (ref 1.7–7.7)
NEUTROPHILS NFR BLD: 82 %
NEUTS BAND NFR BLD MANUAL: 1 % (ref 0–7)
PLATELET # BLD AUTO: 498 K/UL (ref 135–450)
PLATELET BLD QL SMEAR: ABNORMAL
PMV BLD AUTO: 8.5 FL (ref 5–10.5)
POTASSIUM SERPL-SCNC: 4.4 MMOL/L (ref 3.5–5.1)
PROT SERPL-MCNC: 7.8 G/DL (ref 6.4–8.2)
RBC # BLD AUTO: 4.35 M/UL (ref 4–5.2)
SLIDE REVIEW: ABNORMAL
SODIUM SERPL-SCNC: 141 MMOL/L (ref 136–145)
T4 FREE SERPL-MCNC: 1.7 NG/DL (ref 0.9–1.8)
TSH SERPL DL<=0.005 MIU/L-ACNC: 4.42 UIU/ML (ref 0.27–4.2)
VARIANT LYMPHS NFR BLD MANUAL: 1 % (ref 0–6)
WBC # BLD AUTO: 13.7 K/UL (ref 4–11)

## 2025-07-02 PROCEDURE — 36415 COLL VENOUS BLD VENIPUNCTURE: CPT | Performed by: NURSE PRACTITIONER

## 2025-07-07 ENCOUNTER — RESULTS FOLLOW-UP (OUTPATIENT)
Dept: INTERNAL MEDICINE CLINIC | Age: 80
End: 2025-07-07

## 2025-07-07 DIAGNOSIS — D72.829 LEUKOCYTOSIS, UNSPECIFIED TYPE: Primary | ICD-10-CM

## 2025-07-07 DIAGNOSIS — R79.89 ELEVATED LFTS: ICD-10-CM

## 2025-07-14 ENCOUNTER — LAB (OUTPATIENT)
Dept: INTERNAL MEDICINE CLINIC | Age: 80
End: 2025-07-14
Payer: MEDICARE

## 2025-07-14 DIAGNOSIS — D72.829 LEUKOCYTOSIS, UNSPECIFIED TYPE: ICD-10-CM

## 2025-07-14 DIAGNOSIS — R79.89 ELEVATED LFTS: ICD-10-CM

## 2025-07-14 LAB
ALBUMIN SERPL-MCNC: 3.9 G/DL (ref 3.4–5)
ALP SERPL-CCNC: 121 U/L (ref 40–129)
ALT SERPL-CCNC: 41 U/L (ref 10–40)
AST SERPL-CCNC: 43 U/L (ref 15–37)
BASOPHILS # BLD: 0.1 K/UL (ref 0–0.2)
BASOPHILS NFR BLD: 1.1 %
BILIRUB DIRECT SERPL-MCNC: 0.2 MG/DL (ref 0–0.3)
BILIRUB INDIRECT SERPL-MCNC: 0.1 MG/DL (ref 0–1)
BILIRUB SERPL-MCNC: 0.3 MG/DL (ref 0–1)
DEPRECATED RDW RBC AUTO: 13 % (ref 12.4–15.4)
EOSINOPHIL # BLD: 0.3 K/UL (ref 0–0.6)
EOSINOPHIL NFR BLD: 2.8 %
HCT VFR BLD AUTO: 34.3 % (ref 36–48)
HGB BLD-MCNC: 12.3 G/DL (ref 12–16)
LYMPHOCYTES # BLD: 1.9 K/UL (ref 1–5.1)
LYMPHOCYTES NFR BLD: 17.2 %
MCH RBC QN AUTO: 30.4 PG (ref 26–34)
MCHC RBC AUTO-ENTMCNC: 35.8 G/DL (ref 31–36)
MCV RBC AUTO: 84.9 FL (ref 80–100)
MONOCYTES # BLD: 1.4 K/UL (ref 0–1.3)
MONOCYTES NFR BLD: 12.5 %
NEUTROPHILS # BLD: 7.3 K/UL (ref 1.7–7.7)
NEUTROPHILS NFR BLD: 66.4 %
PLATELET # BLD AUTO: 416 K/UL (ref 135–450)
PMV BLD AUTO: 8.5 FL (ref 5–10.5)
PROT SERPL-MCNC: 7.8 G/DL (ref 6.4–8.2)
RBC # BLD AUTO: 4.04 M/UL (ref 4–5.2)
WBC # BLD AUTO: 11 K/UL (ref 4–11)

## 2025-07-14 PROCEDURE — 36415 COLL VENOUS BLD VENIPUNCTURE: CPT | Performed by: NURSE PRACTITIONER

## 2025-07-14 ASSESSMENT — PATIENT HEALTH QUESTIONNAIRE - PHQ9
SUM OF ALL RESPONSES TO PHQ QUESTIONS 1-9: 0
1. LITTLE INTEREST OR PLEASURE IN DOING THINGS: NOT AT ALL
SUM OF ALL RESPONSES TO PHQ QUESTIONS 1-9: 0
2. FEELING DOWN, DEPRESSED OR HOPELESS: NOT AT ALL

## 2025-07-30 RX ORDER — ATORVASTATIN CALCIUM 80 MG/1
80 TABLET, FILM COATED ORAL DAILY
Qty: 30 TABLET | Refills: 0 | Status: SHIPPED | OUTPATIENT
Start: 2025-07-30

## 2025-07-30 NOTE — TELEPHONE ENCOUNTER
Medication:   Requested Prescriptions     Pending Prescriptions Disp Refills    atorvastatin (LIPITOR) 80 MG tablet [Pharmacy Med Name: ATORVASTATIN 80MG TABLETS] 30 tablet 0     Sig: TAKE 1 TABLET BY MOUTH DAILY       Last Filled:      Patient Phone Number: [phone] (home)     Last appt: 7/1/2025   Next appt: 10/3/2025  Future Appointments   Date Time Provider Department Center   10/3/2025 11:30 AM Jasmeet Oliveira MD [facility]   10/21/2025 11:30 AM Kevon Meneses MD [facility]

## 2025-08-22 RX ORDER — LOSARTAN POTASSIUM AND HYDROCHLOROTHIAZIDE 25; 100 MG/1; MG/1
1 TABLET ORAL DAILY
Qty: 90 TABLET | Refills: 0 | Status: SHIPPED | OUTPATIENT
Start: 2025-08-22 | End: 2025-11-20

## 2025-08-22 RX ORDER — AMLODIPINE BESYLATE 10 MG/1
10 TABLET ORAL DAILY
Qty: 90 TABLET | Refills: 0 | Status: SHIPPED | OUTPATIENT
Start: 2025-08-22 | End: 2025-11-20

## 2025-09-02 RX ORDER — METFORMIN HYDROCHLORIDE 500 MG/1
TABLET, EXTENDED RELEASE ORAL
Qty: 270 TABLET | Refills: 3 | Status: SHIPPED | OUTPATIENT
Start: 2025-09-02

## 2025-09-03 RX ORDER — ATORVASTATIN CALCIUM 80 MG/1
80 TABLET, FILM COATED ORAL DAILY
Qty: 30 TABLET | Refills: 0 | Status: SHIPPED | OUTPATIENT
Start: 2025-09-03

## (undated) DEVICE — SOLUTION IRRIG BSS ST 500ML

## (undated) DEVICE — Z DISCONTINUED USE 2131664 WIPE INSTR W3XL3IN NONLINTING

## (undated) DEVICE — SYRINGE MED 30ML STD CLR PLAS LUERLOCK TIP N CTRL DISP

## (undated) DEVICE — ENDOSCOPY KIT: Brand: MEDLINE INDUSTRIES, INC.

## (undated) DEVICE — SYRINGE MED 3ML CLR PLAS STD N CTRL LUERLOCK TIP DISP

## (undated) DEVICE — ELECTRODE PT RET AD L9FT HI MOIST COND ADH HYDRGEL CORDED

## (undated) DEVICE — RETRIEVER ENDOSCP L230CM SHTH DIA2.5MM NET 3X6CM STD FOR

## (undated) DEVICE — SYRINGE TB 1ML NDL 25GA L0.625IN PLAS SLIP TIP CONVENTIONAL

## (undated) DEVICE — GLOVE SURG SZ 85 CRM LTX FREE POLYISOPRENE POLYMER BEAD ANTI

## (undated) DEVICE — SNARE ENDOSCP L240CM OD2.4MM LOOP W25MM RND INSUL STIFF

## (undated) DEVICE — SURGICAL PROCEDURE PACK PIK PPK374205] ALCON LABORATORIES INC]

## (undated) DEVICE — SOLUTION IRRIG 500ML STRL H2O NONPYROGENIC

## (undated) DEVICE — BITE BLOCK ENDOSCP AD 60 FR W/ ADJ STRP PLAS GRN BLOX

## (undated) DEVICE — Device: Brand: MEDEX

## (undated) DEVICE — SNARE ENDOSCP L240CM LOOP W27MM SHTH DIA2.4MM WRK CHN 2.8MM

## (undated) DEVICE — REPLAY HEMOSTASIS CLIP, 16MM SPAN: Brand: REPLAY

## (undated) DEVICE — FORCEPS BX 240CM 2.4MM L NDL RAD JAW 4 M00513334

## (undated) DEVICE — SURGICAL PROC PACK SHT WEST V4

## (undated) DEVICE — DEVICE GRSP L230CM SHTH DIA2.4MM HYBRID JAW FLX DST WIRE